# Patient Record
Sex: MALE | HISPANIC OR LATINO | Employment: FULL TIME
[De-identification: names, ages, dates, MRNs, and addresses within clinical notes are randomized per-mention and may not be internally consistent; named-entity substitution may affect disease eponyms.]

---

## 2017-02-10 ENCOUNTER — RECORDS - HEALTHEAST (OUTPATIENT)
Dept: ADMINISTRATIVE | Facility: OTHER | Age: 37
End: 2017-02-10

## 2017-06-23 ENCOUNTER — RECORDS - HEALTHEAST (OUTPATIENT)
Dept: ADMINISTRATIVE | Facility: OTHER | Age: 37
End: 2017-06-23

## 2017-06-23 ENCOUNTER — RECORDS - HEALTHEAST (OUTPATIENT)
Dept: LAB | Facility: CLINIC | Age: 37
End: 2017-06-23

## 2017-06-23 LAB
CHOLEST SERPL-MCNC: 172 MG/DL
FASTING STATUS PATIENT QL REPORTED: NO
HDLC SERPL-MCNC: 46 MG/DL
LDLC SERPL CALC-MCNC: 109 MG/DL
TRIGL SERPL-MCNC: 86 MG/DL

## 2017-07-09 ENCOUNTER — RECORDS - HEALTHEAST (OUTPATIENT)
Dept: ADMINISTRATIVE | Facility: OTHER | Age: 37
End: 2017-07-09

## 2017-07-20 ENCOUNTER — RECORDS - HEALTHEAST (OUTPATIENT)
Dept: ADMINISTRATIVE | Facility: OTHER | Age: 37
End: 2017-07-20

## 2018-01-04 ENCOUNTER — RECORDS - HEALTHEAST (OUTPATIENT)
Dept: ADMINISTRATIVE | Facility: OTHER | Age: 38
End: 2018-01-04

## 2018-07-10 ENCOUNTER — COMMUNICATION - HEALTHEAST (OUTPATIENT)
Dept: TELEHEALTH | Facility: CLINIC | Age: 38
End: 2018-07-10

## 2018-07-10 ENCOUNTER — OFFICE VISIT - HEALTHEAST (OUTPATIENT)
Dept: FAMILY MEDICINE | Facility: CLINIC | Age: 38
End: 2018-07-10

## 2018-07-10 ENCOUNTER — COMMUNICATION - HEALTHEAST (OUTPATIENT)
Dept: FAMILY MEDICINE | Facility: CLINIC | Age: 38
End: 2018-07-10

## 2018-07-10 DIAGNOSIS — F41.9 ANXIETY AND DEPRESSION: ICD-10-CM

## 2018-07-10 DIAGNOSIS — Z13.1 SCREENING FOR DIABETES MELLITUS: ICD-10-CM

## 2018-07-10 DIAGNOSIS — Z76.89 ENCOUNTER TO ESTABLISH CARE: ICD-10-CM

## 2018-07-10 DIAGNOSIS — E78.5 HYPERLIPEMIA: ICD-10-CM

## 2018-07-10 DIAGNOSIS — F32.A ANXIETY AND DEPRESSION: ICD-10-CM

## 2018-07-10 DIAGNOSIS — I10 HYPERTENSION: ICD-10-CM

## 2018-07-10 LAB
ALBUMIN SERPL-MCNC: 4.1 G/DL (ref 3.5–5)
ALP SERPL-CCNC: 130 U/L (ref 45–120)
ALT SERPL W P-5'-P-CCNC: 40 U/L (ref 0–45)
ANION GAP SERPL CALCULATED.3IONS-SCNC: 11 MMOL/L (ref 5–18)
AST SERPL W P-5'-P-CCNC: 25 U/L (ref 0–40)
BILIRUB SERPL-MCNC: 0.9 MG/DL (ref 0–1)
BUN SERPL-MCNC: 20 MG/DL (ref 8–22)
CALCIUM SERPL-MCNC: 9.8 MG/DL (ref 8.5–10.5)
CHLORIDE BLD-SCNC: 104 MMOL/L (ref 98–107)
CHOLEST SERPL-MCNC: 154 MG/DL
CO2 SERPL-SCNC: 25 MMOL/L (ref 22–31)
CREAT SERPL-MCNC: 0.93 MG/DL (ref 0.7–1.3)
FASTING STATUS PATIENT QL REPORTED: NO
GFR SERPL CREATININE-BSD FRML MDRD: >60 ML/MIN/1.73M2
GLUCOSE BLD-MCNC: 106 MG/DL (ref 70–125)
HBA1C MFR BLD: 5.8 % (ref 3.5–6)
HDLC SERPL-MCNC: 48 MG/DL
LDLC SERPL CALC-MCNC: 89 MG/DL
POTASSIUM BLD-SCNC: 4.5 MMOL/L (ref 3.5–5)
PROT SERPL-MCNC: 7.8 G/DL (ref 6–8)
SODIUM SERPL-SCNC: 140 MMOL/L (ref 136–145)
TRIGL SERPL-MCNC: 86 MG/DL

## 2018-07-10 ASSESSMENT — MIFFLIN-ST. JEOR: SCORE: 2232.12

## 2018-11-23 ENCOUNTER — COMMUNICATION - HEALTHEAST (OUTPATIENT)
Dept: FAMILY MEDICINE | Facility: CLINIC | Age: 38
End: 2018-11-23

## 2018-11-23 DIAGNOSIS — E78.5 HYPERLIPEMIA: ICD-10-CM

## 2019-08-19 ENCOUNTER — COMMUNICATION - HEALTHEAST (OUTPATIENT)
Dept: FAMILY MEDICINE | Facility: CLINIC | Age: 39
End: 2019-08-19

## 2019-08-19 DIAGNOSIS — I10 ESSENTIAL HYPERTENSION: ICD-10-CM

## 2019-08-27 ENCOUNTER — OFFICE VISIT - HEALTHEAST (OUTPATIENT)
Dept: FAMILY MEDICINE | Facility: CLINIC | Age: 39
End: 2019-08-27

## 2019-08-27 DIAGNOSIS — F41.9 ANXIETY AND DEPRESSION: ICD-10-CM

## 2019-08-27 DIAGNOSIS — E66.01 MORBID OBESITY (H): ICD-10-CM

## 2019-08-27 DIAGNOSIS — I10 ESSENTIAL HYPERTENSION: ICD-10-CM

## 2019-08-27 DIAGNOSIS — F40.243 ANXIETY WITH FLYING: ICD-10-CM

## 2019-08-27 DIAGNOSIS — F32.A ANXIETY AND DEPRESSION: ICD-10-CM

## 2019-08-27 DIAGNOSIS — E78.5 HYPERLIPIDEMIA, UNSPECIFIED HYPERLIPIDEMIA TYPE: ICD-10-CM

## 2019-08-27 DIAGNOSIS — Z13.1 SCREENING FOR DIABETES MELLITUS: ICD-10-CM

## 2019-08-27 LAB
ALBUMIN SERPL-MCNC: 4 G/DL (ref 3.5–5)
ALP SERPL-CCNC: 132 U/L (ref 45–120)
ALT SERPL W P-5'-P-CCNC: 123 U/L (ref 0–45)
ANION GAP SERPL CALCULATED.3IONS-SCNC: 11 MMOL/L (ref 5–18)
AST SERPL W P-5'-P-CCNC: 61 U/L (ref 0–40)
BILIRUB SERPL-MCNC: 0.7 MG/DL (ref 0–1)
BUN SERPL-MCNC: 18 MG/DL (ref 8–22)
CALCIUM SERPL-MCNC: 10 MG/DL (ref 8.5–10.5)
CHLORIDE BLD-SCNC: 105 MMOL/L (ref 98–107)
CHOLEST SERPL-MCNC: 167 MG/DL
CO2 SERPL-SCNC: 25 MMOL/L (ref 22–31)
CREAT SERPL-MCNC: 1.13 MG/DL (ref 0.7–1.3)
FASTING STATUS PATIENT QL REPORTED: NO
GFR SERPL CREATININE-BSD FRML MDRD: >60 ML/MIN/1.73M2
GLUCOSE BLD-MCNC: 157 MG/DL (ref 70–125)
HBA1C MFR BLD: 8.7 % (ref 3.5–6)
HDLC SERPL-MCNC: 37 MG/DL
LDLC SERPL CALC-MCNC: 93 MG/DL
POTASSIUM BLD-SCNC: 4.6 MMOL/L (ref 3.5–5)
PROT SERPL-MCNC: 7.4 G/DL (ref 6–8)
SODIUM SERPL-SCNC: 141 MMOL/L (ref 136–145)
TRIGL SERPL-MCNC: 186 MG/DL

## 2019-08-27 RX ORDER — LORAZEPAM 0.5 MG/1
TABLET ORAL
Qty: 10 TABLET | Refills: 0 | Status: SHIPPED | OUTPATIENT
Start: 2019-08-27 | End: 2021-11-17

## 2019-08-28 ENCOUNTER — COMMUNICATION - HEALTHEAST (OUTPATIENT)
Dept: FAMILY MEDICINE | Facility: CLINIC | Age: 39
End: 2019-08-28

## 2019-08-28 DIAGNOSIS — E11.65 TYPE 2 DIABETES MELLITUS WITH HYPERGLYCEMIA, WITHOUT LONG-TERM CURRENT USE OF INSULIN (H): ICD-10-CM

## 2019-09-21 ENCOUNTER — AMBULATORY - HEALTHEAST (OUTPATIENT)
Dept: NURSING | Facility: CLINIC | Age: 39
End: 2019-09-21

## 2019-09-26 ENCOUNTER — COMMUNICATION - HEALTHEAST (OUTPATIENT)
Dept: FAMILY MEDICINE | Facility: CLINIC | Age: 39
End: 2019-09-26

## 2019-12-09 ENCOUNTER — COMMUNICATION - HEALTHEAST (OUTPATIENT)
Dept: FAMILY MEDICINE | Facility: CLINIC | Age: 39
End: 2019-12-09

## 2019-12-09 DIAGNOSIS — E11.65 TYPE 2 DIABETES MELLITUS WITH HYPERGLYCEMIA, WITHOUT LONG-TERM CURRENT USE OF INSULIN (H): ICD-10-CM

## 2019-12-10 ENCOUNTER — COMMUNICATION - HEALTHEAST (OUTPATIENT)
Dept: FAMILY MEDICINE | Facility: CLINIC | Age: 39
End: 2019-12-10

## 2019-12-10 DIAGNOSIS — E11.65 TYPE 2 DIABETES MELLITUS WITH HYPERGLYCEMIA, WITHOUT LONG-TERM CURRENT USE OF INSULIN (H): ICD-10-CM

## 2020-01-06 ENCOUNTER — OFFICE VISIT - HEALTHEAST (OUTPATIENT)
Dept: FAMILY MEDICINE | Facility: CLINIC | Age: 40
End: 2020-01-06

## 2020-01-06 DIAGNOSIS — E66.01 MORBID OBESITY (H): ICD-10-CM

## 2020-01-06 DIAGNOSIS — R79.89 ELEVATED LFTS: ICD-10-CM

## 2020-01-06 DIAGNOSIS — E11.65 TYPE 2 DIABETES MELLITUS WITH HYPERGLYCEMIA, WITHOUT LONG-TERM CURRENT USE OF INSULIN (H): ICD-10-CM

## 2020-01-06 LAB
ALBUMIN SERPL-MCNC: 3.7 G/DL (ref 3.5–5)
ALP SERPL-CCNC: 114 U/L (ref 45–120)
ALT SERPL W P-5'-P-CCNC: 167 U/L (ref 0–45)
ANION GAP SERPL CALCULATED.3IONS-SCNC: 9 MMOL/L (ref 5–18)
AST SERPL W P-5'-P-CCNC: 96 U/L (ref 0–40)
BILIRUB SERPL-MCNC: 0.7 MG/DL (ref 0–1)
BUN SERPL-MCNC: 14 MG/DL (ref 8–22)
CALCIUM SERPL-MCNC: 9.1 MG/DL (ref 8.5–10.5)
CHLORIDE BLD-SCNC: 104 MMOL/L (ref 98–107)
CO2 SERPL-SCNC: 24 MMOL/L (ref 22–31)
CREAT SERPL-MCNC: 0.96 MG/DL (ref 0.7–1.3)
CREAT UR-MCNC: 121.3 MG/DL
GFR SERPL CREATININE-BSD FRML MDRD: >60 ML/MIN/1.73M2
GLUCOSE BLD-MCNC: 269 MG/DL (ref 70–125)
HBA1C MFR BLD: 10.1 % (ref 3.5–6)
MICROALBUMIN UR-MCNC: <0.5 MG/DL (ref 0–1.99)
MICROALBUMIN/CREAT UR: NORMAL MG/G{CREAT}
POTASSIUM BLD-SCNC: 5.1 MMOL/L (ref 3.5–5)
PROT SERPL-MCNC: 7.1 G/DL (ref 6–8)
SODIUM SERPL-SCNC: 137 MMOL/L (ref 136–145)

## 2020-01-06 ASSESSMENT — MIFFLIN-ST. JEOR: SCORE: 2326.47

## 2020-01-10 ENCOUNTER — HOSPITAL ENCOUNTER (OUTPATIENT)
Dept: ULTRASOUND IMAGING | Facility: CLINIC | Age: 40
Discharge: HOME | End: 2020-01-10

## 2020-01-10 DIAGNOSIS — R94.5 ABNORMAL RESULTS OF LIVER FUNCTION STUDIES: ICD-10-CM

## 2020-01-10 DIAGNOSIS — R79.89 ELEVATED LFTS: ICD-10-CM

## 2020-01-10 RX ORDER — GLUCOSAMINE HCL/CHONDROITIN SU 500-400 MG
1 CAPSULE ORAL 2 TIMES DAILY PRN
Qty: 100 STRIP | Refills: 2 | Status: SHIPPED | OUTPATIENT
Start: 2020-01-10 | End: 2022-03-08

## 2020-01-27 ENCOUNTER — COMMUNICATION - HEALTHEAST (OUTPATIENT)
Dept: SCHEDULING | Facility: CLINIC | Age: 40
End: 2020-01-27

## 2020-01-28 ENCOUNTER — OFFICE VISIT - HEALTHEAST (OUTPATIENT)
Dept: FAMILY MEDICINE | Facility: CLINIC | Age: 40
End: 2020-01-28

## 2020-01-28 DIAGNOSIS — R42 DIZZINESS: ICD-10-CM

## 2020-01-28 DIAGNOSIS — E11.65 TYPE 2 DIABETES MELLITUS WITH HYPERGLYCEMIA, WITHOUT LONG-TERM CURRENT USE OF INSULIN (H): ICD-10-CM

## 2020-01-28 DIAGNOSIS — R19.7 DIARRHEA, UNSPECIFIED TYPE: ICD-10-CM

## 2020-01-30 ENCOUNTER — COMMUNICATION - HEALTHEAST (OUTPATIENT)
Dept: PHARMACY | Facility: CLINIC | Age: 40
End: 2020-01-30

## 2020-02-04 ENCOUNTER — AMBULATORY - HEALTHEAST (OUTPATIENT)
Dept: PHARMACY | Facility: CLINIC | Age: 40
End: 2020-02-04

## 2020-02-04 DIAGNOSIS — E11.65 TYPE 2 DIABETES MELLITUS WITH HYPERGLYCEMIA, WITHOUT LONG-TERM CURRENT USE OF INSULIN (H): ICD-10-CM

## 2020-02-05 ENCOUNTER — OFFICE VISIT - HEALTHEAST (OUTPATIENT)
Dept: PHARMACY | Facility: CLINIC | Age: 40
End: 2020-02-05

## 2020-02-05 DIAGNOSIS — E78.5 HYPERLIPIDEMIA, UNSPECIFIED HYPERLIPIDEMIA TYPE: ICD-10-CM

## 2020-02-05 DIAGNOSIS — E11.65 TYPE 2 DIABETES MELLITUS WITH HYPERGLYCEMIA, WITHOUT LONG-TERM CURRENT USE OF INSULIN (H): ICD-10-CM

## 2020-02-05 DIAGNOSIS — F32.A ANXIETY AND DEPRESSION: ICD-10-CM

## 2020-02-05 DIAGNOSIS — F41.9 ANXIETY AND DEPRESSION: ICD-10-CM

## 2020-02-05 DIAGNOSIS — I10 ESSENTIAL HYPERTENSION: ICD-10-CM

## 2020-02-15 ENCOUNTER — COMMUNICATION - HEALTHEAST (OUTPATIENT)
Dept: FAMILY MEDICINE | Facility: CLINIC | Age: 40
End: 2020-02-15

## 2020-02-15 DIAGNOSIS — E78.5 HYPERLIPIDEMIA, UNSPECIFIED HYPERLIPIDEMIA TYPE: ICD-10-CM

## 2020-03-09 ENCOUNTER — OFFICE VISIT - HEALTHEAST (OUTPATIENT)
Dept: PHARMACY | Facility: CLINIC | Age: 40
End: 2020-03-09

## 2020-03-09 DIAGNOSIS — F32.A ANXIETY AND DEPRESSION: ICD-10-CM

## 2020-03-09 DIAGNOSIS — E11.65 TYPE 2 DIABETES MELLITUS WITH HYPERGLYCEMIA, WITHOUT LONG-TERM CURRENT USE OF INSULIN (H): ICD-10-CM

## 2020-03-09 DIAGNOSIS — F41.9 ANXIETY AND DEPRESSION: ICD-10-CM

## 2020-03-09 DIAGNOSIS — E78.5 HYPERLIPIDEMIA, UNSPECIFIED HYPERLIPIDEMIA TYPE: ICD-10-CM

## 2020-03-09 DIAGNOSIS — I10 ESSENTIAL HYPERTENSION: ICD-10-CM

## 2020-03-09 ASSESSMENT — ANXIETY QUESTIONNAIRES
IF YOU CHECKED OFF ANY PROBLEMS ON THIS QUESTIONNAIRE, HOW DIFFICULT HAVE THESE PROBLEMS MADE IT FOR YOU TO DO YOUR WORK, TAKE CARE OF THINGS AT HOME, OR GET ALONG WITH OTHER PEOPLE: NOT DIFFICULT AT ALL
6. BECOMING EASILY ANNOYED OR IRRITABLE: NOT AT ALL
GAD7 TOTAL SCORE: 1
5. BEING SO RESTLESS THAT IT IS HARD TO SIT STILL: NOT AT ALL
4. TROUBLE RELAXING: NOT AT ALL
7. FEELING AFRAID AS IF SOMETHING AWFUL MIGHT HAPPEN: NOT AT ALL
2. NOT BEING ABLE TO STOP OR CONTROL WORRYING: NOT AT ALL
3. WORRYING TOO MUCH ABOUT DIFFERENT THINGS: NOT AT ALL
1. FEELING NERVOUS, ANXIOUS, OR ON EDGE: SEVERAL DAYS

## 2020-03-09 ASSESSMENT — PATIENT HEALTH QUESTIONNAIRE - PHQ9: SUM OF ALL RESPONSES TO PHQ QUESTIONS 1-9: 0

## 2020-03-30 ENCOUNTER — COMMUNICATION - HEALTHEAST (OUTPATIENT)
Dept: FAMILY MEDICINE | Facility: CLINIC | Age: 40
End: 2020-03-30

## 2020-04-05 ENCOUNTER — COMMUNICATION - HEALTHEAST (OUTPATIENT)
Dept: NURSING | Facility: CLINIC | Age: 40
End: 2020-04-05

## 2020-05-07 ENCOUNTER — COMMUNICATION - HEALTHEAST (OUTPATIENT)
Dept: FAMILY MEDICINE | Facility: CLINIC | Age: 40
End: 2020-05-07

## 2020-05-07 DIAGNOSIS — E11.65 TYPE 2 DIABETES MELLITUS WITH HYPERGLYCEMIA, WITHOUT LONG-TERM CURRENT USE OF INSULIN (H): ICD-10-CM

## 2020-05-18 ENCOUNTER — COMMUNICATION - HEALTHEAST (OUTPATIENT)
Dept: NURSING | Facility: CLINIC | Age: 40
End: 2020-05-18

## 2020-05-28 ENCOUNTER — AMBULATORY - HEALTHEAST (OUTPATIENT)
Dept: LAB | Facility: CLINIC | Age: 40
End: 2020-05-28

## 2020-05-28 DIAGNOSIS — E11.65 TYPE 2 DIABETES MELLITUS WITH HYPERGLYCEMIA, WITHOUT LONG-TERM CURRENT USE OF INSULIN (H): ICD-10-CM

## 2020-05-28 LAB
ALBUMIN SERPL-MCNC: 3.6 G/DL (ref 3.5–5)
ALP SERPL-CCNC: 98 U/L (ref 45–120)
ALT SERPL W P-5'-P-CCNC: 72 U/L (ref 0–45)
ANION GAP SERPL CALCULATED.3IONS-SCNC: 10 MMOL/L (ref 5–18)
AST SERPL W P-5'-P-CCNC: 84 U/L (ref 0–40)
BILIRUB SERPL-MCNC: 0.6 MG/DL (ref 0–1)
BUN SERPL-MCNC: 13 MG/DL (ref 8–22)
CALCIUM SERPL-MCNC: 9.1 MG/DL (ref 8.5–10.5)
CHLORIDE BLD-SCNC: 104 MMOL/L (ref 98–107)
CO2 SERPL-SCNC: 26 MMOL/L (ref 22–31)
CREAT SERPL-MCNC: 0.91 MG/DL (ref 0.7–1.3)
GFR SERPL CREATININE-BSD FRML MDRD: >60 ML/MIN/1.73M2
GLUCOSE BLD-MCNC: 115 MG/DL (ref 70–125)
HBA1C MFR BLD: 5.8 % (ref 3.5–6)
POTASSIUM BLD-SCNC: 4.4 MMOL/L (ref 3.5–5)
PROT SERPL-MCNC: 7 G/DL (ref 6–8)
SODIUM SERPL-SCNC: 140 MMOL/L (ref 136–145)

## 2020-06-01 ENCOUNTER — OFFICE VISIT - HEALTHEAST (OUTPATIENT)
Dept: FAMILY MEDICINE | Facility: CLINIC | Age: 40
End: 2020-06-01

## 2020-06-01 ENCOUNTER — AMBULATORY - HEALTHEAST (OUTPATIENT)
Dept: PHARMACY | Facility: CLINIC | Age: 40
End: 2020-06-01

## 2020-06-01 DIAGNOSIS — F41.9 ANXIETY AND DEPRESSION: ICD-10-CM

## 2020-06-01 DIAGNOSIS — E11.65 TYPE 2 DIABETES MELLITUS WITH HYPERGLYCEMIA, WITHOUT LONG-TERM CURRENT USE OF INSULIN (H): ICD-10-CM

## 2020-06-01 DIAGNOSIS — F32.A ANXIETY AND DEPRESSION: ICD-10-CM

## 2020-06-01 DIAGNOSIS — E78.5 HYPERLIPIDEMIA, UNSPECIFIED HYPERLIPIDEMIA TYPE: ICD-10-CM

## 2020-06-01 DIAGNOSIS — I10 ESSENTIAL HYPERTENSION: ICD-10-CM

## 2020-06-01 PROCEDURE — 99607 MTMS BY PHARM ADDL 15 MIN: CPT | Performed by: PHARMACIST

## 2020-06-01 PROCEDURE — 99606 MTMS BY PHARM EST 15 MIN: CPT | Performed by: PHARMACIST

## 2020-06-01 ASSESSMENT — ANXIETY QUESTIONNAIRES
4. TROUBLE RELAXING: NEARLY EVERY DAY
3. WORRYING TOO MUCH ABOUT DIFFERENT THINGS: NEARLY EVERY DAY
1. FEELING NERVOUS, ANXIOUS, OR ON EDGE: SEVERAL DAYS
5. BEING SO RESTLESS THAT IT IS HARD TO SIT STILL: SEVERAL DAYS
2. NOT BEING ABLE TO STOP OR CONTROL WORRYING: NEARLY EVERY DAY
IF YOU CHECKED OFF ANY PROBLEMS ON THIS QUESTIONNAIRE, HOW DIFFICULT HAVE THESE PROBLEMS MADE IT FOR YOU TO DO YOUR WORK, TAKE CARE OF THINGS AT HOME, OR GET ALONG WITH OTHER PEOPLE: NOT DIFFICULT AT ALL
6. BECOMING EASILY ANNOYED OR IRRITABLE: SEVERAL DAYS
GAD7 TOTAL SCORE: 13
7. FEELING AFRAID AS IF SOMETHING AWFUL MIGHT HAPPEN: SEVERAL DAYS

## 2020-07-03 ENCOUNTER — OFFICE VISIT - HEALTHEAST (OUTPATIENT)
Dept: FAMILY MEDICINE | Facility: CLINIC | Age: 40
End: 2020-07-03

## 2020-07-03 ENCOUNTER — RECORDS - HEALTHEAST (OUTPATIENT)
Dept: GENERAL RADIOLOGY | Facility: CLINIC | Age: 40
End: 2020-07-03

## 2020-07-03 DIAGNOSIS — M79.672 PAIN IN LEFT FOOT: ICD-10-CM

## 2020-07-03 DIAGNOSIS — G57.62 MORTON'S NEUROMA OF LEFT FOOT: ICD-10-CM

## 2020-07-03 DIAGNOSIS — M79.672 LEFT FOOT PAIN: ICD-10-CM

## 2020-07-14 ENCOUNTER — COMMUNICATION - HEALTHEAST (OUTPATIENT)
Dept: NURSING | Facility: CLINIC | Age: 40
End: 2020-07-14

## 2020-07-16 ENCOUNTER — COMMUNICATION - HEALTHEAST (OUTPATIENT)
Dept: FAMILY MEDICINE | Facility: CLINIC | Age: 40
End: 2020-07-16

## 2020-07-17 ENCOUNTER — AMBULATORY - HEALTHEAST (OUTPATIENT)
Dept: PHARMACY | Facility: CLINIC | Age: 40
End: 2020-07-17

## 2020-07-17 DIAGNOSIS — E11.65 TYPE 2 DIABETES MELLITUS WITH HYPERGLYCEMIA, WITHOUT LONG-TERM CURRENT USE OF INSULIN (H): ICD-10-CM

## 2020-07-26 ENCOUNTER — COMMUNICATION - HEALTHEAST (OUTPATIENT)
Dept: FAMILY MEDICINE | Facility: CLINIC | Age: 40
End: 2020-07-26

## 2020-07-26 DIAGNOSIS — F32.A ANXIETY AND DEPRESSION: ICD-10-CM

## 2020-07-26 DIAGNOSIS — F41.9 ANXIETY AND DEPRESSION: ICD-10-CM

## 2020-07-27 ENCOUNTER — COMMUNICATION - HEALTHEAST (OUTPATIENT)
Dept: NURSING | Facility: CLINIC | Age: 40
End: 2020-07-27

## 2020-07-27 ENCOUNTER — AMBULATORY - HEALTHEAST (OUTPATIENT)
Dept: PHARMACY | Facility: CLINIC | Age: 40
End: 2020-07-27

## 2020-07-27 DIAGNOSIS — F32.A ANXIETY AND DEPRESSION: ICD-10-CM

## 2020-07-27 DIAGNOSIS — E78.5 HYPERLIPIDEMIA, UNSPECIFIED HYPERLIPIDEMIA TYPE: ICD-10-CM

## 2020-07-27 DIAGNOSIS — F41.9 ANXIETY AND DEPRESSION: ICD-10-CM

## 2020-07-27 DIAGNOSIS — E11.65 TYPE 2 DIABETES MELLITUS WITH HYPERGLYCEMIA, WITHOUT LONG-TERM CURRENT USE OF INSULIN (H): ICD-10-CM

## 2020-07-27 DIAGNOSIS — I10 ESSENTIAL HYPERTENSION: ICD-10-CM

## 2020-07-27 PROCEDURE — 99606 MTMS BY PHARM EST 15 MIN: CPT | Performed by: PHARMACIST

## 2020-08-05 ENCOUNTER — COMMUNICATION - HEALTHEAST (OUTPATIENT)
Dept: FAMILY MEDICINE | Facility: CLINIC | Age: 40
End: 2020-08-05

## 2020-08-28 ENCOUNTER — COMMUNICATION - HEALTHEAST (OUTPATIENT)
Dept: FAMILY MEDICINE | Facility: CLINIC | Age: 40
End: 2020-08-28

## 2020-08-31 ENCOUNTER — OFFICE VISIT - HEALTHEAST (OUTPATIENT)
Dept: FAMILY MEDICINE | Facility: CLINIC | Age: 40
End: 2020-08-31

## 2020-08-31 DIAGNOSIS — F41.9 ANXIETY AND DEPRESSION: ICD-10-CM

## 2020-08-31 DIAGNOSIS — F32.A ANXIETY AND DEPRESSION: ICD-10-CM

## 2020-08-31 DIAGNOSIS — G47.00 INSOMNIA, UNSPECIFIED TYPE: ICD-10-CM

## 2020-08-31 ASSESSMENT — ANXIETY QUESTIONNAIRES
4. TROUBLE RELAXING: NEARLY EVERY DAY
6. BECOMING EASILY ANNOYED OR IRRITABLE: NEARLY EVERY DAY
IF YOU CHECKED OFF ANY PROBLEMS ON THIS QUESTIONNAIRE, HOW DIFFICULT HAVE THESE PROBLEMS MADE IT FOR YOU TO DO YOUR WORK, TAKE CARE OF THINGS AT HOME, OR GET ALONG WITH OTHER PEOPLE: SOMEWHAT DIFFICULT
1. FEELING NERVOUS, ANXIOUS, OR ON EDGE: NEARLY EVERY DAY
3. WORRYING TOO MUCH ABOUT DIFFERENT THINGS: NEARLY EVERY DAY
GAD7 TOTAL SCORE: 21
5. BEING SO RESTLESS THAT IT IS HARD TO SIT STILL: NEARLY EVERY DAY
7. FEELING AFRAID AS IF SOMETHING AWFUL MIGHT HAPPEN: NEARLY EVERY DAY
2. NOT BEING ABLE TO STOP OR CONTROL WORRYING: NEARLY EVERY DAY

## 2020-08-31 ASSESSMENT — PATIENT HEALTH QUESTIONNAIRE - PHQ9: SUM OF ALL RESPONSES TO PHQ QUESTIONS 1-9: 20

## 2020-09-08 ENCOUNTER — COMMUNICATION - HEALTHEAST (OUTPATIENT)
Dept: FAMILY MEDICINE | Facility: CLINIC | Age: 40
End: 2020-09-08

## 2020-09-08 ENCOUNTER — OFFICE VISIT - HEALTHEAST (OUTPATIENT)
Dept: FAMILY MEDICINE | Facility: CLINIC | Age: 40
End: 2020-09-08

## 2020-09-08 DIAGNOSIS — G47.00 INSOMNIA, UNSPECIFIED TYPE: ICD-10-CM

## 2020-09-08 DIAGNOSIS — F32.A ANXIETY AND DEPRESSION: ICD-10-CM

## 2020-09-08 DIAGNOSIS — F41.9 ANXIETY AND DEPRESSION: ICD-10-CM

## 2020-09-09 ASSESSMENT — ANXIETY QUESTIONNAIRES
2. NOT BEING ABLE TO STOP OR CONTROL WORRYING: NEARLY EVERY DAY
5. BEING SO RESTLESS THAT IT IS HARD TO SIT STILL: MORE THAN HALF THE DAYS
GAD7 TOTAL SCORE: 20
7. FEELING AFRAID AS IF SOMETHING AWFUL MIGHT HAPPEN: NEARLY EVERY DAY
1. FEELING NERVOUS, ANXIOUS, OR ON EDGE: NEARLY EVERY DAY
4. TROUBLE RELAXING: NEARLY EVERY DAY
IF YOU CHECKED OFF ANY PROBLEMS ON THIS QUESTIONNAIRE, HOW DIFFICULT HAVE THESE PROBLEMS MADE IT FOR YOU TO DO YOUR WORK, TAKE CARE OF THINGS AT HOME, OR GET ALONG WITH OTHER PEOPLE: SOMEWHAT DIFFICULT
6. BECOMING EASILY ANNOYED OR IRRITABLE: NEARLY EVERY DAY
3. WORRYING TOO MUCH ABOUT DIFFERENT THINGS: NEARLY EVERY DAY

## 2020-09-09 ASSESSMENT — PATIENT HEALTH QUESTIONNAIRE - PHQ9: SUM OF ALL RESPONSES TO PHQ QUESTIONS 1-9: 24

## 2020-09-13 ENCOUNTER — COMMUNICATION - HEALTHEAST (OUTPATIENT)
Dept: FAMILY MEDICINE | Facility: CLINIC | Age: 40
End: 2020-09-13

## 2020-09-13 DIAGNOSIS — I10 ESSENTIAL HYPERTENSION: ICD-10-CM

## 2020-09-18 ENCOUNTER — OFFICE VISIT - HEALTHEAST (OUTPATIENT)
Dept: FAMILY MEDICINE | Facility: CLINIC | Age: 40
End: 2020-09-18

## 2020-09-18 ENCOUNTER — COMMUNICATION - HEALTHEAST (OUTPATIENT)
Dept: SCHEDULING | Facility: CLINIC | Age: 40
End: 2020-09-18

## 2020-09-18 DIAGNOSIS — I10 ESSENTIAL HYPERTENSION: ICD-10-CM

## 2020-09-18 DIAGNOSIS — G47.00 INSOMNIA, UNSPECIFIED TYPE: ICD-10-CM

## 2020-09-18 DIAGNOSIS — F32.A ANXIETY AND DEPRESSION: ICD-10-CM

## 2020-09-18 DIAGNOSIS — F41.9 ANXIETY AND DEPRESSION: ICD-10-CM

## 2020-09-18 RX ORDER — LISINOPRIL 5 MG/1
5 TABLET ORAL DAILY
Qty: 90 TABLET | Refills: 3 | Status: SHIPPED | OUTPATIENT
Start: 2020-09-18 | End: 2021-07-23

## 2020-09-18 ASSESSMENT — ANXIETY QUESTIONNAIRES
3. WORRYING TOO MUCH ABOUT DIFFERENT THINGS: MORE THAN HALF THE DAYS
4. TROUBLE RELAXING: MORE THAN HALF THE DAYS
GAD7 TOTAL SCORE: 13
1. FEELING NERVOUS, ANXIOUS, OR ON EDGE: MORE THAN HALF THE DAYS
7. FEELING AFRAID AS IF SOMETHING AWFUL MIGHT HAPPEN: MORE THAN HALF THE DAYS
5. BEING SO RESTLESS THAT IT IS HARD TO SIT STILL: SEVERAL DAYS
6. BECOMING EASILY ANNOYED OR IRRITABLE: MORE THAN HALF THE DAYS
2. NOT BEING ABLE TO STOP OR CONTROL WORRYING: MORE THAN HALF THE DAYS

## 2020-09-18 ASSESSMENT — PATIENT HEALTH QUESTIONNAIRE - PHQ9: SUM OF ALL RESPONSES TO PHQ QUESTIONS 1-9: 17

## 2020-09-19 ENCOUNTER — COMMUNICATION - HEALTHEAST (OUTPATIENT)
Dept: SCHEDULING | Facility: CLINIC | Age: 40
End: 2020-09-19

## 2020-09-19 DIAGNOSIS — G47.00 INSOMNIA, UNSPECIFIED TYPE: ICD-10-CM

## 2020-09-19 DIAGNOSIS — F32.A ANXIETY AND DEPRESSION: ICD-10-CM

## 2020-09-19 DIAGNOSIS — F41.9 ANXIETY AND DEPRESSION: ICD-10-CM

## 2020-10-23 ENCOUNTER — COMMUNICATION - HEALTHEAST (OUTPATIENT)
Dept: ADMINISTRATIVE | Facility: CLINIC | Age: 40
End: 2020-10-23

## 2020-11-09 ENCOUNTER — VIRTUAL VISIT (OUTPATIENT)
Dept: FAMILY MEDICINE | Facility: OTHER | Age: 40
End: 2020-11-09

## 2020-11-09 NOTE — PROGRESS NOTES
"Date: 2020 07:31:03  Clinician: Mechelle Dill  Clinician NPI: [id number]  Patient: Misael Morillo  Patient : 1980  Patient Address[place] [facility] [place] [id number]  Patient Phone: [phone]  Visit Protocol: URI  Patient Summary:  Misael is a 40 year old ( : 1980 ) male who initiated a [facility] Visit for COVID-19 (Coronavirus) evaluation and screening. When asked the question \"Please sign me up to receive news, health information and promotions from [facility].\", Misael responded \"Yes\".    Misael states his symptoms started 1-2 days ago.   His symptoms consist of myalgia, chills, malaise, a sore throat, ageusia, a headache, a cough, nasal congestion, and anosmia. He is experiencing mild difficulty breathing with activities but can speak normally in full sentences. Misael also feels feverish.   Symptom details     Nasal secretions: The color of his mucus is yellow.    Cough: Misael coughs a few times an hour and his cough is not more bothersome at night. Phlegm comes into his throat when he coughs. He believes his cough is caused by post-nasal drip. The color of the phlegm is green and yellow.     Sore throat: Misael reports having mild throat pain (1-3 on a 10 point pain scale), does not have exudate on his tonsils, and can swallow liquids. He is not sure if the lymph nodes in his neck are enlarged. A rash has not appeared on the skin since the sore throat started.     Temperature: His current temperature is 97 degrees Fahrenheit.     Headache: He states the headache is mild (1-3 on a 10 point pain scale).      Misael denies having vomiting, rhinitis, facial pain or pressure, teeth pain, diarrhea, ear pain, wheezing, and nausea. He also denies taking antibiotic medication in the past month, having recent facial or sinus surgery in the past 60 days, and having a sinus infection within the past year.   Precipitating events  Within the past week, Misael has been exposed to someone with strep throat. He has not " recently been exposed to someone with influenza. Misael has been in close contact with the following high risk individuals: people with asthma, heart disease or diabetes.   Pertinent COVID-19 (Coronavirus) information  Misael does not work or volunteer as healthcare worker or a . In the past 14 days, Misael has not worked or volunteered at a healthcare facility or group living setting.   In the past 14 days, he also has not lived in a congregate living setting.   Misael has not had a close contact with a laboratory-confirmed COVID-19 patient within 14 days of symptom onset.    Since December 2019, Misael has not been tested for COVID-19 and has not had upper respiratory infection or influenza-like illness.   Pertinent medical history  Misael needs a return to work/school note.   Weight: 318 lbs   Misael does not smoke or use smokeless tobacco.   Weight: 318 lbs    MEDICATIONS: atorvastatin oral, metformin oral, lisinopril-hydrochlorothiazide oral, venlafaxine oral, trazodone oral, Rybelsus oral, ALLERGIES: NKDA  Clinician Response:  Dear Misael,   Your symptoms show that you may have coronavirus (COVID-19). This illness can cause fever, cough and trouble breathing. Many people get a mild case and get better on their own. Some people can get very sick.  What should I do?  We would like to test you for this virus.   1. Please call [phone] to schedule your visit. Explain that you were [facility] have [facility]-19 test. Be ready to share your [facility] visit ID number.  * If you need to schedule in [facility] please call [phone] or for [employer] employees please call [phone].  * If you need to schedule in the [place] area please call [phone]. [employer] employees call [phone].  The following will serve as your written order for this COVID Test, ordered by me, for the indication of suspected COVID [Z20.828]: The test will be ordered in [employer], our electronic health record, after you are  "scheduled. It will show as ordered and authorized by Boy Ruiz MD.  Order: COVID-19 (Coronavirus) PCR for SYMPTOMATIC testing from [facility].   2. When it's time for your COVID test:  Stay at least 6 feet away from others. (If someone will drive you to your test, stay in the backseat, as far away from the  as you can.)   Cover your mouth and nose with a mask, tissue or washcloth.  Go straight to the testing site. Don't make any stops on the way there or back.      3.Starting now: Stay home and away from others (self-isolate) until:   You've had no fever---and no medicine that reduces fever---for one full day (24 hours). And...   Your other symptoms have gotten better. For example, your cough or breathing has improved. And...   At least 10 days have passed since your symptoms started.       During this time, don't leave the house except for testing or medical care.   Stay in your own room, even for meals. Use your own bathroom if you can.   Stay away from others in your home. No hugging, kissing or shaking hands. No visitors.  Don't go to work, school or anywhere else.    Clean \"high touch\" surfaces often (doorknobs, counters, handles, etc.). Use a household cleaning spray or wipes. You'll find a full list of  on the EPA website: www.epa.gov/pesticide-registration/list-n-disinfectants-use-against-sars-cov-2.   Cover your mouth and nose with a mask, tissue or washcloth to avoid spreading germs.  Wash your hands and face often. Use soap and water.  Caregivers in these groups are at risk for severe illness due to COVID-19:  o People 65 years and older  o People who live in a nursing home or long-term care facility  o People with chronic disease (lung, heart, cancer, diabetes, kidney, liver, immunologic)  o People who have a weakened immune system, including those who:   Are in cancer treatment  Take medicine that weakens the immune system, such as corticosteroids  Had a bone marrow or organ transplant  Have " an immune deficiency  Have poorly controlled HIV or AIDS  Are obese (body mass index of 40 or higher)  Smoke regularly   o Caregivers should wear gloves while washing dishes, handling laundry and cleaning bedrooms and bathrooms.  o Use caution when washing and drying laundry: Don't shake dirty laundry, and use the warmest water setting that you can.  o For more tips, go to www.cdc.gov/coronavirus/2019-ncov/downloads/10Things.pdf.    4.Sign up for [employer]. We know it's scary to hear that you might have COVID-19. We want to track your symptoms to make sure you're okay over the next 2 weeks. Please look for an email from [employer]---this is a free, online program that we'll use to keep in touch. To sign up, follow the link in the email. Learn more at [email or web address]  How can I take care of myself?   Get lots of rest. Drink extra fluids (unless a doctor has told you not to).   Take Tylenol (acetaminophen) for fever or pain. If you have liver or kidney problems, ask your family doctor if it's okay to take Tylenol.   Adults can take either:    650 mg (two 325 mg pills) every 4 to 6 hours, or...   1,000 mg (two 500 mg pills) every 8 hours as needed.    Note: Don't take more than 3,000 mg in one day. Acetaminophen is found in many medicines (both prescribed and over-the-counter medicines). Read all labels to be sure you don't take too much.   For children, check the Tylenol bottle for the right dose. The dose is based on the child's age or weight.    If you have other health problems (like cancer, heart failure, an organ transplant or severe kidney disease): Call your specialty clinic if you don't feel better in the next 2 days.       Know when to call 911. Emergency warning signs include:    Trouble breathing or shortness of breath Pain or pressure in the chest that doesn't go away Feeling confused like you haven't felt before, or not being able to wake up Bluish-colored lips or face.  Where can I  get more information?   [facility] -- About COVID-19: [email or web address]   CDC -- What to Do If You're Sick: www.cdc.gov/coronavirus/2019-ncov/about/steps-when-sick.html   [facility] -- Ending Home Isolation: www.cdc.gov/coronavirus/2019-ncov/hcp/disposition-in-home-patients.html   [facility] -- Caring for Someone: www.cdc.gov/coronavirus/2019-ncov/if-you-are-sick/care-for-someone.html   [facility] -- Interim Guidance for Hospital Discharge to Home: www.[facility].[place].us/diseases/coronavirus/hcp/hospdischarge.pdf   [facility] clinical trials (COVID-19 research studies): clinicalaffairs.[facility]-clinical-trials    Below are the COVID-19 hotlines at the [place] Department of Health ([facility]). Interpreters are available.    For health questions: Call 554-627-6331 or 1-430.765.1014 (7 a.m. to 7 p.m.) For questions about schools and childcare: Call 422-004-9925 or 1-104.743.7781 (7 a.m. to 7 p.m.)    Diagnosis: Myalgia, unspecified site  Diagnosis ICD: M79.10

## 2020-11-10 ENCOUNTER — OFFICE VISIT - HEALTHEAST (OUTPATIENT)
Dept: FAMILY MEDICINE | Facility: CLINIC | Age: 40
End: 2020-11-10

## 2020-11-10 ENCOUNTER — COMMUNICATION - HEALTHEAST (OUTPATIENT)
Dept: FAMILY MEDICINE | Facility: CLINIC | Age: 40
End: 2020-11-10

## 2020-11-10 DIAGNOSIS — J02.9 SORE THROAT: ICD-10-CM

## 2020-11-10 LAB — DEPRECATED S PYO AG THROAT QL EIA: NORMAL

## 2020-11-11 LAB — GROUP A STREP BY PCR: NORMAL

## 2020-11-18 ENCOUNTER — COMMUNICATION - HEALTHEAST (OUTPATIENT)
Dept: FAMILY MEDICINE | Facility: CLINIC | Age: 40
End: 2020-11-18

## 2020-11-18 DIAGNOSIS — F32.A ANXIETY AND DEPRESSION: ICD-10-CM

## 2020-11-18 DIAGNOSIS — G47.00 INSOMNIA, UNSPECIFIED TYPE: ICD-10-CM

## 2020-11-18 DIAGNOSIS — F41.9 ANXIETY AND DEPRESSION: ICD-10-CM

## 2021-01-06 ENCOUNTER — COMMUNICATION - HEALTHEAST (OUTPATIENT)
Dept: FAMILY MEDICINE | Facility: CLINIC | Age: 41
End: 2021-01-06

## 2021-01-15 ENCOUNTER — OFFICE VISIT - HEALTHEAST (OUTPATIENT)
Dept: FAMILY MEDICINE | Facility: CLINIC | Age: 41
End: 2021-01-15

## 2021-01-15 DIAGNOSIS — G47.00 INSOMNIA, UNSPECIFIED TYPE: ICD-10-CM

## 2021-01-15 DIAGNOSIS — E66.01 MORBID OBESITY (H): ICD-10-CM

## 2021-01-15 DIAGNOSIS — F41.9 ANXIETY AND DEPRESSION: ICD-10-CM

## 2021-01-15 DIAGNOSIS — F32.A ANXIETY AND DEPRESSION: ICD-10-CM

## 2021-01-15 DIAGNOSIS — E11.65 TYPE 2 DIABETES MELLITUS WITH HYPERGLYCEMIA, WITHOUT LONG-TERM CURRENT USE OF INSULIN (H): ICD-10-CM

## 2021-01-15 LAB
ALBUMIN SERPL-MCNC: 4.1 G/DL (ref 3.5–5)
ALP SERPL-CCNC: 122 U/L (ref 45–120)
ALT SERPL W P-5'-P-CCNC: 198 U/L (ref 0–45)
ANION GAP SERPL CALCULATED.3IONS-SCNC: 10 MMOL/L (ref 5–18)
AST SERPL W P-5'-P-CCNC: 112 U/L (ref 0–40)
BILIRUB SERPL-MCNC: 0.8 MG/DL (ref 0–1)
BUN SERPL-MCNC: 13 MG/DL (ref 8–22)
CALCIUM SERPL-MCNC: 9.4 MG/DL (ref 8.5–10.5)
CHLORIDE BLD-SCNC: 102 MMOL/L (ref 98–107)
CHOLEST SERPL-MCNC: 153 MG/DL
CO2 SERPL-SCNC: 27 MMOL/L (ref 22–31)
CREAT SERPL-MCNC: 1.06 MG/DL (ref 0.7–1.3)
CREAT UR-MCNC: 318.2 MG/DL
FASTING STATUS PATIENT QL REPORTED: YES
GFR SERPL CREATININE-BSD FRML MDRD: >60 ML/MIN/1.73M2
GLUCOSE BLD-MCNC: 271 MG/DL (ref 70–125)
HBA1C MFR BLD: 10.7 %
HDLC SERPL-MCNC: 38 MG/DL
LDLC SERPL CALC-MCNC: 91 MG/DL
MICROALBUMIN UR-MCNC: 1.99 MG/DL (ref 0–1.99)
MICROALBUMIN/CREAT UR: 6.3 MG/G
POTASSIUM BLD-SCNC: 4.5 MMOL/L (ref 3.5–5)
PROT SERPL-MCNC: 7.6 G/DL (ref 6–8)
SODIUM SERPL-SCNC: 139 MMOL/L (ref 136–145)
TRIGL SERPL-MCNC: 118 MG/DL

## 2021-01-15 RX ORDER — VENLAFAXINE HYDROCHLORIDE 75 MG/1
225 CAPSULE, EXTENDED RELEASE ORAL DAILY
Qty: 240 CAPSULE | Refills: 3 | Status: SHIPPED | OUTPATIENT
Start: 2021-01-15 | End: 2021-11-15 | Stop reason: DRUGHIGH

## 2021-01-15 RX ORDER — TRAZODONE HYDROCHLORIDE 100 MG/1
200 TABLET ORAL AT BEDTIME
Qty: 180 TABLET | Refills: 3 | Status: SHIPPED | OUTPATIENT
Start: 2021-01-15 | End: 2021-10-26

## 2021-01-15 ASSESSMENT — ANXIETY QUESTIONNAIRES
2. NOT BEING ABLE TO STOP OR CONTROL WORRYING: MORE THAN HALF THE DAYS
3. WORRYING TOO MUCH ABOUT DIFFERENT THINGS: NEARLY EVERY DAY
GAD7 TOTAL SCORE: 15
4. TROUBLE RELAXING: MORE THAN HALF THE DAYS
5. BEING SO RESTLESS THAT IT IS HARD TO SIT STILL: SEVERAL DAYS
6. BECOMING EASILY ANNOYED OR IRRITABLE: MORE THAN HALF THE DAYS
IF YOU CHECKED OFF ANY PROBLEMS ON THIS QUESTIONNAIRE, HOW DIFFICULT HAVE THESE PROBLEMS MADE IT FOR YOU TO DO YOUR WORK, TAKE CARE OF THINGS AT HOME, OR GET ALONG WITH OTHER PEOPLE: SOMEWHAT DIFFICULT
1. FEELING NERVOUS, ANXIOUS, OR ON EDGE: NEARLY EVERY DAY
7. FEELING AFRAID AS IF SOMETHING AWFUL MIGHT HAPPEN: MORE THAN HALF THE DAYS

## 2021-01-15 ASSESSMENT — PATIENT HEALTH QUESTIONNAIRE - PHQ9: SUM OF ALL RESPONSES TO PHQ QUESTIONS 1-9: 13

## 2021-01-19 ENCOUNTER — COMMUNICATION - HEALTHEAST (OUTPATIENT)
Dept: FAMILY MEDICINE | Facility: CLINIC | Age: 41
End: 2021-01-19

## 2021-01-21 ENCOUNTER — COMMUNICATION - HEALTHEAST (OUTPATIENT)
Dept: FAMILY MEDICINE | Facility: CLINIC | Age: 41
End: 2021-01-21

## 2021-01-22 ENCOUNTER — COMMUNICATION - HEALTHEAST (OUTPATIENT)
Dept: NURSING | Facility: CLINIC | Age: 41
End: 2021-01-22

## 2021-01-22 ENCOUNTER — OFFICE VISIT - HEALTHEAST (OUTPATIENT)
Dept: PHARMACY | Facility: CLINIC | Age: 41
End: 2021-01-22

## 2021-01-22 DIAGNOSIS — G47.00 INSOMNIA, UNSPECIFIED TYPE: ICD-10-CM

## 2021-01-22 DIAGNOSIS — F32.A ANXIETY AND DEPRESSION: ICD-10-CM

## 2021-01-22 DIAGNOSIS — F41.9 ANXIETY AND DEPRESSION: ICD-10-CM

## 2021-01-22 DIAGNOSIS — E11.65 TYPE 2 DIABETES MELLITUS WITH HYPERGLYCEMIA, WITHOUT LONG-TERM CURRENT USE OF INSULIN (H): ICD-10-CM

## 2021-01-22 PROCEDURE — 99605 MTMS BY PHARM NP 15 MIN: CPT | Performed by: PHARMACIST

## 2021-01-22 PROCEDURE — 99607 MTMS BY PHARM ADDL 15 MIN: CPT | Performed by: PHARMACIST

## 2021-02-17 ENCOUNTER — RECORDS - HEALTHEAST (OUTPATIENT)
Dept: ADMINISTRATIVE | Facility: OTHER | Age: 41
End: 2021-02-17

## 2021-02-17 LAB — RETINOPATHY: NEGATIVE

## 2021-02-23 ENCOUNTER — RECORDS - HEALTHEAST (OUTPATIENT)
Dept: HEALTH INFORMATION MANAGEMENT | Facility: CLINIC | Age: 41
End: 2021-02-23

## 2021-02-26 ENCOUNTER — COMMUNICATION - HEALTHEAST (OUTPATIENT)
Dept: FAMILY MEDICINE | Facility: CLINIC | Age: 41
End: 2021-02-26

## 2021-02-26 DIAGNOSIS — E78.5 HYPERLIPIDEMIA, UNSPECIFIED HYPERLIPIDEMIA TYPE: ICD-10-CM

## 2021-02-26 DIAGNOSIS — E11.65 TYPE 2 DIABETES MELLITUS WITH HYPERGLYCEMIA, WITHOUT LONG-TERM CURRENT USE OF INSULIN (H): ICD-10-CM

## 2021-02-28 RX ORDER — ATORVASTATIN CALCIUM 20 MG/1
TABLET, FILM COATED ORAL
Qty: 90 TABLET | Refills: 3 | Status: SHIPPED | OUTPATIENT
Start: 2021-02-28 | End: 2022-02-15

## 2021-03-18 ENCOUNTER — COMMUNICATION - HEALTHEAST (OUTPATIENT)
Dept: FAMILY MEDICINE | Facility: CLINIC | Age: 41
End: 2021-03-18

## 2021-04-14 ENCOUNTER — COMMUNICATION - HEALTHEAST (OUTPATIENT)
Dept: FAMILY MEDICINE | Facility: CLINIC | Age: 41
End: 2021-04-14

## 2021-04-21 ENCOUNTER — COMMUNICATION - HEALTHEAST (OUTPATIENT)
Dept: FAMILY MEDICINE | Facility: CLINIC | Age: 41
End: 2021-04-21

## 2021-04-22 ENCOUNTER — AMBULATORY - HEALTHEAST (OUTPATIENT)
Dept: NURSING | Facility: CLINIC | Age: 41
End: 2021-04-22

## 2021-04-22 DIAGNOSIS — Z23 ENCOUNTER FOR IMMUNIZATION: ICD-10-CM

## 2021-04-23 ENCOUNTER — AMBULATORY - HEALTHEAST (OUTPATIENT)
Dept: NURSING | Facility: CLINIC | Age: 41
End: 2021-04-23

## 2021-04-23 ENCOUNTER — OFFICE VISIT - HEALTHEAST (OUTPATIENT)
Dept: FAMILY MEDICINE | Facility: CLINIC | Age: 41
End: 2021-04-23

## 2021-04-23 ENCOUNTER — COMMUNICATION - HEALTHEAST (OUTPATIENT)
Dept: FAMILY MEDICINE | Facility: CLINIC | Age: 41
End: 2021-04-23

## 2021-04-23 DIAGNOSIS — F32.A ANXIETY AND DEPRESSION: ICD-10-CM

## 2021-04-23 DIAGNOSIS — F41.9 ANXIETY AND DEPRESSION: ICD-10-CM

## 2021-04-23 DIAGNOSIS — Z23 ENCOUNTER FOR IMMUNIZATION: ICD-10-CM

## 2021-04-23 DIAGNOSIS — E11.65 TYPE 2 DIABETES MELLITUS WITH HYPERGLYCEMIA, WITHOUT LONG-TERM CURRENT USE OF INSULIN (H): ICD-10-CM

## 2021-04-23 LAB — HBA1C MFR BLD: 12.2 %

## 2021-05-10 ENCOUNTER — AMBULATORY - HEALTHEAST (OUTPATIENT)
Dept: PHARMACY | Facility: CLINIC | Age: 41
End: 2021-05-10

## 2021-05-10 ENCOUNTER — OFFICE VISIT - HEALTHEAST (OUTPATIENT)
Dept: PHARMACY | Facility: CLINIC | Age: 41
End: 2021-05-10

## 2021-05-10 DIAGNOSIS — I10 ESSENTIAL HYPERTENSION: ICD-10-CM

## 2021-05-10 DIAGNOSIS — F41.9 ANXIETY AND DEPRESSION: ICD-10-CM

## 2021-05-10 DIAGNOSIS — E11.65 TYPE 2 DIABETES MELLITUS WITH HYPERGLYCEMIA, WITHOUT LONG-TERM CURRENT USE OF INSULIN (H): ICD-10-CM

## 2021-05-10 DIAGNOSIS — F32.A ANXIETY AND DEPRESSION: ICD-10-CM

## 2021-05-10 DIAGNOSIS — E78.5 HYPERLIPIDEMIA, UNSPECIFIED HYPERLIPIDEMIA TYPE: ICD-10-CM

## 2021-05-10 PROCEDURE — 99607 MTMS BY PHARM ADDL 15 MIN: CPT | Performed by: PHARMACIST

## 2021-05-10 PROCEDURE — 99606 MTMS BY PHARM EST 15 MIN: CPT | Performed by: PHARMACIST

## 2021-05-10 RX ORDER — BUSPIRONE HYDROCHLORIDE 5 MG/1
5 TABLET ORAL 2 TIMES DAILY
Qty: 60 TABLET | Refills: 3 | Status: SHIPPED | OUTPATIENT
Start: 2021-05-10 | End: 2021-07-27

## 2021-05-10 RX ORDER — FLASH GLUCOSE SENSOR
1 KIT MISCELLANEOUS
Qty: 2 KIT | Refills: 11 | Status: SHIPPED | OUTPATIENT
Start: 2021-05-10 | End: 2022-04-18

## 2021-05-17 ENCOUNTER — COMMUNICATION - HEALTHEAST (OUTPATIENT)
Dept: NURSING | Facility: CLINIC | Age: 41
End: 2021-05-17

## 2021-05-17 DIAGNOSIS — E11.65 TYPE 2 DIABETES MELLITUS WITH HYPERGLYCEMIA, WITHOUT LONG-TERM CURRENT USE OF INSULIN (H): ICD-10-CM

## 2021-05-26 ASSESSMENT — PATIENT HEALTH QUESTIONNAIRE - PHQ9
SUM OF ALL RESPONSES TO PHQ QUESTIONS 1-9: 17
SUM OF ALL RESPONSES TO PHQ QUESTIONS 1-9: 0

## 2021-05-27 ASSESSMENT — PATIENT HEALTH QUESTIONNAIRE - PHQ9
SUM OF ALL RESPONSES TO PHQ QUESTIONS 1-9: 13
SUM OF ALL RESPONSES TO PHQ QUESTIONS 1-9: 24
SUM OF ALL RESPONSES TO PHQ QUESTIONS 1-9: 20

## 2021-05-28 ASSESSMENT — ANXIETY QUESTIONNAIRES
GAD7 TOTAL SCORE: 1
GAD7 TOTAL SCORE: 15
GAD7 TOTAL SCORE: 13
GAD7 TOTAL SCORE: 21
GAD7 TOTAL SCORE: 20
GAD7 TOTAL SCORE: 13

## 2021-05-31 NOTE — TELEPHONE ENCOUNTER
RN cannot approve Refill Request    RN can NOT refill this medication Protocol failed and NO refill given. Last office visit: 7/10/2018 Leesa Noland NP Last Physical: Visit date not found Last MTM visit: Visit date not found Last visit same specialty: 7/10/2018 Leesa Noland NP.  Next visit within 3 mo: Visit date not found  Next physical within 3 mo: Visit date not found      Julisa Abrams, [facility] Triage/Med Refill 8/19/2019    Requested Prescriptions   Pending Prescriptions Disp Refills     lisinopril (PRINIVIL,ZESTRIL) 5 MG tablet [Pharmacy Med Name: LISINOPRIL 5MG TABLETS] 90 tablet 0     Sig: TAKE 1 TABLET BY MOUTH EVERY DAY       [employer] Inhibitors Refill Protocol Failed - 8/19/2019 12:14 PM        Failed - PCP or prescribing provider visit in past 12 months       Last office visit with prescriber/PCP: 7/10/2018 Leesa Noland NP OR same dept: Visit date not found OR same specialty: 7/10/2018 Leesa Noland NP  Last physical: Visit date not found Last MTM visit: Visit date not found   Next visit within 3 mo: Visit date not found  Next physical within 3 mo: Visit date not found  Prescriber OR PCP: Leesa Noland NP  Last diagnosis associated with med order: There are no diagnoses linked to this encounter.  If protocol passes may refill for 12 months if within 3 months of last provider visit (or a total of 15 months).             Failed - Serum Potassium in past 12 months     No results found for: LN-POTASSIUM          Failed - Blood pressure filed in past 12 months     BP Readings from Last 1 Encounters:   07/10/18 110/64             Failed - Serum Creatinine in past 12 months     Creatinine   Date Value Ref [place] Status   07/10/2018 0.93 0.70 - 1.30 mg/dL Final

## 2021-05-31 NOTE — TELEPHONE ENCOUNTER
Please call patient.  Refilled Lisinopril x 1 month.  Due for office visit and labs prior to any additional refills.    Leesa Noland NP

## 2021-06-01 VITALS — HEIGHT: 69 IN | WEIGHT: 295.8 LBS | BODY MASS INDEX: 43.81 KG/M2

## 2021-06-03 VITALS
BODY MASS INDEX: 46.65 KG/M2 | HEIGHT: 69 IN | HEART RATE: 74 BPM | TEMPERATURE: 97.6 F | SYSTOLIC BLOOD PRESSURE: 114 MMHG | DIASTOLIC BLOOD PRESSURE: 70 MMHG | WEIGHT: 315 LBS

## 2021-06-03 VITALS — BODY MASS INDEX: 47.85 KG/M2 | WEIGHT: 315 LBS

## 2021-06-04 VITALS
BODY MASS INDEX: 45.5 KG/M2 | HEART RATE: 90 BPM | SYSTOLIC BLOOD PRESSURE: 124 MMHG | WEIGHT: 308.13 LBS | TEMPERATURE: 98.4 F | DIASTOLIC BLOOD PRESSURE: 72 MMHG | OXYGEN SATURATION: 95 %

## 2021-06-04 VITALS
DIASTOLIC BLOOD PRESSURE: 76 MMHG | WEIGHT: 313 LBS | SYSTOLIC BLOOD PRESSURE: 132 MMHG | OXYGEN SATURATION: 97 % | HEART RATE: 96 BPM | BODY MASS INDEX: 46.22 KG/M2

## 2021-06-04 VITALS
DIASTOLIC BLOOD PRESSURE: 64 MMHG | WEIGHT: 315 LBS | SYSTOLIC BLOOD PRESSURE: 110 MMHG | BODY MASS INDEX: 46.67 KG/M2 | HEART RATE: 74 BPM

## 2021-06-04 VITALS
DIASTOLIC BLOOD PRESSURE: 71 MMHG | BODY MASS INDEX: 45.38 KG/M2 | HEART RATE: 73 BPM | SYSTOLIC BLOOD PRESSURE: 118 MMHG | WEIGHT: 307.3 LBS

## 2021-06-04 VITALS
BODY MASS INDEX: 44.45 KG/M2 | WEIGHT: 301 LBS | HEART RATE: 75 BPM | SYSTOLIC BLOOD PRESSURE: 125 MMHG | DIASTOLIC BLOOD PRESSURE: 84 MMHG

## 2021-06-04 VITALS
WEIGHT: 313 LBS | HEART RATE: 88 BPM | DIASTOLIC BLOOD PRESSURE: 76 MMHG | SYSTOLIC BLOOD PRESSURE: 110 MMHG | BODY MASS INDEX: 46.22 KG/M2

## 2021-06-04 VITALS
SYSTOLIC BLOOD PRESSURE: 118 MMHG | BODY MASS INDEX: 45.34 KG/M2 | DIASTOLIC BLOOD PRESSURE: 71 MMHG | WEIGHT: 307 LBS | HEART RATE: 73 BPM

## 2021-06-04 NOTE — TELEPHONE ENCOUNTER
RN cannot approve Refill Request    RN can NOT refill this medication PCP messaged that patient is overdue for Labs. Last office visit: 8/27/2019 Leesa Noland NP Last Physical: Visit date not found Last MTM visit: Visit date not found Last visit same specialty: 8/27/2019 Leesa Noland NP.  Next visit within 3 mo: Visit date not found  Next physical within 3 mo: Visit date not found      Altagracia Patterson, [facility] Triage/Med Refill 12/11/2019    Requested Prescriptions   Pending Prescriptions Disp Refills     metFORMIN (GLUCOPHAGE-XR) 500 MG 24 hr tablet [Pharmacy Med Name: METFORMIN ER 500MG 24HR TABS] 360 tablet 0     Sig: TAKE 2 TABLETS(1000 MG) BY MOUTH TWICE DAILY WITH MEALS       Metformin Refill Protocol Failed - 12/10/2019  9:15 AM        Failed - Microalbumin in last year      No results found for: MICROALBUR               Passed - Blood pressure in last 12 months     BP Readings from Last 1 Encounters:   08/27/19 128/82             Passed - LFT or AST or ALT in last 12 months     Albumin   Date Value Ref Range Status   08/27/2019 4.0 3.5 - 5.0 g/dL Final     Bilirubin, Total   Date Value Ref Range Status   08/27/2019 0.7 0.0 - 1.0 mg/dL Final     Alkaline Phosphatase   Date Value Ref Range Status   08/27/2019 132 (H) 45 - 120 U/L Final     AST   Date Value Ref Range Status   08/27/2019 61 (H) 0 - 40 U/L Final     ALT   Date Value Ref Range Status   08/27/2019 123 (H) 0 - 45 U/L Final     Protein, Total   Date Value Ref Range Status   08/27/2019 7.4 6.0 - 8.0 g/dL Final                Passed - GFR or Serum Creatinine in last 6 months     GFR MDRD Non Af Amer   Date Value Ref Range Status   08/27/2019 >60 >60 mL/min/1.73m2 Final     GFR MDRD Af Amer   Date Value Ref Range Status   08/27/2019 >60 >60 mL/min/1.73m2 Final             Passed - Visit with PCP or prescribing provider visit in last 6 months or next 3 months     Last office visit with prescriber/PCP: 8/27/2019 OR same dept: 8/27/2019  Wagstrom, Leesa E, NP OR same specialty: 8/27/2019 Leesa Noland NP Last physical: Visit date not found Last MTM visit: Visit date not found         Next appt within 3 mo: Visit date not found  Next physical within 3 mo: Visit date not found  Prescriber OR PCP: Leesa Noland NP  Last diagnosis associated with med order: 1. Type 2 diabetes mellitus with hyperglycemia, without long-term current use of insulin (H)  - metFORMIN (GLUCOPHAGE-XR) 500 MG 24 hr tablet [Pharmacy Med Name: METFORMIN ER 500MG 24HR TABS]; TAKE 2 TABLETS(1000 MG) BY MOUTH TWICE DAILY WITH MEALS  Dispense: 360 tablet; Refill: 0     If protocol passes may refill for 12 months if within 3 months of last provider visit (or a total of 15 months).           Passed - A1C in last 6 months     Hemoglobin A1c   Date Value Ref [place] Status   08/27/2019 8.7 (H) 3.5 - 6.0 % Final

## 2021-06-04 NOTE — TELEPHONE ENCOUNTER
Patient is due for a recheck and office visit.  Please help him schedule.  Let me know if he would like a bridge of medications until then.    Leesa Noland NP

## 2021-06-04 NOTE — TELEPHONE ENCOUNTER
RN cannot approve Refill Request    RN can NOT refill this medication PCP messaged that patient is overdue for Labs. Last office visit: 8/27/2019 Leesa Noland NP Last Physical: Visit date not found Last MTM visit: Visit date not found Last visit same specialty: 8/27/2019 Leesa Noland NP.  Next visit within 3 mo: Visit date not found  Next physical within 3 mo: Visit date not found      Wendy GUZMÁN Stacey, [facility] Triage/Med Refill 12/10/2019    Requested Prescriptions   Pending Prescriptions Disp Refills     metFORMIN (GLUCOPHAGE-XR) 500 MG 24 hr tablet [Pharmacy Med Name: METFORMIN ER 500MG 24HR TABS] 180 tablet 0     Sig: TAKE 1 TABLET(500 MG) BY MOUTH TWICE DAILY WITH MEALS       Metformin Refill Protocol Failed - 12/9/2019  9:48 AM        Failed - Microalbumin in last year      No results found for: MICROALBUR               Passed - Blood pressure in last 12 months     BP Readings from Last 1 Encounters:   08/27/19 128/82             Passed - LFT or AST or ALT in last 12 months     Albumin   Date Value Ref Range Status   08/27/2019 4.0 3.5 - 5.0 g/dL Final     Bilirubin, Total   Date Value Ref Range Status   08/27/2019 0.7 0.0 - 1.0 mg/dL Final     Alkaline Phosphatase   Date Value Ref Range Status   08/27/2019 132 (H) 45 - 120 U/L Final     AST   Date Value Ref Range Status   08/27/2019 61 (H) 0 - 40 U/L Final     ALT   Date Value Ref Range Status   08/27/2019 123 (H) 0 - 45 U/L Final     Protein, Total   Date Value Ref Range Status   08/27/2019 7.4 6.0 - 8.0 g/dL Final                Passed - GFR or Serum Creatinine in last 6 months     GFR MDRD Non Af Amer   Date Value Ref Range Status   08/27/2019 >60 >60 mL/min/1.73m2 Final     GFR MDRD Af Amer   Date Value Ref [place] Status   08/27/2019 >60 >60 mL/min/1.73m2 Final             Passed - Visit with PCP or prescribing provider visit in last 6 months or next 3 months     Last office visit with prescriber/PCP: 8/27/2019 OR same dept: 8/27/2019  Wagstrom, Leesa E, NP OR same specialty: 8/27/2019 Leesa Noland NP Last physical: Visit date not found Last MTM visit: Visit date not found         Next appt within 3 mo: Visit date not found  Next physical within 3 mo: Visit date not found  Prescriber OR PCP: Leesa Noland NP  Last diagnosis associated with med order: 1. Type 2 diabetes mellitus with hyperglycemia, without long-term current use of insulin (H)  - metFORMIN (GLUCOPHAGE-XR) 500 MG 24 hr tablet [Pharmacy Med Name: METFORMIN ER 500MG 24HR TABS]; TAKE 1 TABLET(500 MG) BY MOUTH TWICE DAILY WITH MEALS  Dispense: 180 tablet; Refill: 0     If protocol passes may refill for 12 months if within 3 months of last provider visit (or a total of 15 months).           Passed - A1C in last 6 months     Hemoglobin A1c   Date Value Ref [place] Status   08/27/2019 8.7 (H) 3.5 - 6.0 % Final

## 2021-06-05 VITALS
SYSTOLIC BLOOD PRESSURE: 135 MMHG | DIASTOLIC BLOOD PRESSURE: 84 MMHG | WEIGHT: 298 LBS | HEART RATE: 105 BPM | BODY MASS INDEX: 44.01 KG/M2

## 2021-06-05 VITALS
WEIGHT: 313.3 LBS | SYSTOLIC BLOOD PRESSURE: 124 MMHG | DIASTOLIC BLOOD PRESSURE: 84 MMHG | HEART RATE: 80 BPM | BODY MASS INDEX: 46.27 KG/M2

## 2021-06-05 NOTE — PATIENT INSTRUCTIONS - HE
It was nice to see you today. I value your experience during your clinic visits and would be very thankful for your time with providing feedback if you receive a survey via email. Let me know personally if your experience today was not exceptional so that we can serve you better in the future!     Recommendations from today's Medication Management (MTM) visit:                                                      1. Start Rybelsus. You will take 3 mg every morning for the first 30 days, then increase to 7 mg every morning. Keep the medication in the blister pack until you are ready to take it (don't put in a pillbox). Take it on an empty stomach when you first wake up. Take with no more than 4 oz of water. Wait 30 minutes before eating, drinking, or taking other medication.     Text READY to 56314 to get co-pay savings.    2. Decrease the metformin ER to 1 tablet (500 mg) every evening with dinner. If you are still having nausea and diarrhea, stop completely.     Blood Sugar Goals  Before meals:   2 hours after meal: less than 180     3. Increase the sertraline to 100 mg daily for anxiety.     Next MTM appointment:                                                       Monday, March 9th at 7:30 AM     To schedule another MTM appointment, please call the clinic directly at [phone] or schedule via [employer].     My MTM pharmacist's contact information:                                                      Please feel free to contact me with any questions or concerns you have.      Stephy Oglesby, PharmD, [facility]  Medication Management (MTM) Pharmacist  [facility]  [phone]

## 2021-06-05 NOTE — TELEPHONE ENCOUNTER
Called and gave Pt message he understood and will strat with decreasing medication and follow up if no improvement

## 2021-06-05 NOTE — TELEPHONE ENCOUNTER
RN Triage:    Spoke with 39 yr old Jose who has questions about having nausea since increasing Metformin to two tablets with meals on 1/6/20.    Reports:    Nauseated occasionally, usually in the morning, and has vomited once within the past 3 weeks.    BS is generally around 130 fasting, and 180 after meals.     Nausea often happens about 2 hours after taking metformin and eating a meal.  Nausea also occurs when he is working out 3 times weekly.    Pt has not taken his BS when he feels nauseated.    Pt states he has lost weight since 1/1/20, but doesn't know how much he has lost.    PLAN:  Will consult with PCP per protocol regarding nausea with current dosage of Metformin please advise.  Advised he may want to keep a record of BS when nauseated and also keep track of weight.  Advised to treat BS under 70 with glucose.  Advised to call back if symptoms are worsening.    Sarah Galarza RN   [facility] RN Triage      Reason for Disposition    Caller has NON-URGENT medication question about med that PCP prescribed and triager unable to answer question    Protocols used: MEDICATION QUESTION CALL-A-

## 2021-06-05 NOTE — PATIENT INSTRUCTIONS - HE
Go back to Metformin - 1 tablet with breakfast, 1 tablet with dinner    You will get a phone call to get scheduled without our pharmacist

## 2021-06-05 NOTE — PROGRESS NOTES
Assessment/Plan:     1. Type 2 diabetes mellitus with hyperglycemia, without long-term current use of insulin (H)  - Ambulatory referral to Medication Management  - metFORMIN (GLUCOPHAGE XR) 500 MG 24 hr tablet; Take 1 tablet (500 mg total) by mouth 2 (two) times a day with meals.  Dispense: 360 tablet; Refill: 1    2. Dizziness    3. Diarrhea, unspecified type    Suspect symptoms are secondary to his recent increase in metformin.  We will have him go back and down to the previous dose of 500 mg twice daily with meals.  I do suspect the dizziness is from some dehydration, and I encouraged him to push fluids.  I do think he needs additional medication for his diabetes.  Consider a GLP-1, as this would also help with weight loss.  I did place an MTM referral to discuss initiating this.  Patient agrees with plan of care.    Subjective:     Jose Morillo III is a 39 y.o. male who presents with concerns regarding dizziness and diarrhea.  Patient has been getting intermittent dizziness over the last couple of weeks.  Is also been having persistent diarrhea, and has had a couple of episodes of vomiting.  Denies any fever or significant abdominal pain.  History of type 2 diabetes, that has recently been worsening in control.  We did recently increase his metformin XR to 1000 mg twice daily.  Patient denies any chest pain or shortness of breath.  He has been having some mild headaches.  He is feeling more fatigued than normal.  He has been having some intermittent nausea.  Denies any hypoglycemic episodes, but has not checked his blood sugars when he is symptomatic.  Patient usually eats 3 meals per day.  He rarely eats snacks.  Hydration has been difficult for him.      The following portions of the patient's history were reviewed and updated as appropriate: allergies, current medications.    Review of Systems  A comprehensive review of systems was performed and was otherwise negative    Objective:     /71    Pulse 73   Wt (!) 307 lb 4.8 oz (139.4 kg)   BMI 45.38 kg/m      General Appearance: Alert, cooperative, no distress, appears stated age  Back: no CVA tenderness  Lungs: Clear to auscultation bilaterally, respirations unlabored  Heart: Regular rate and rhythm, S1 and S2 normal, no murmur, rub, or gallop   Abdomen: slightly firm, non-tender, bowel sounds active all four quadrants.      Leesa Noland, NP

## 2021-06-05 NOTE — TELEPHONE ENCOUNTER
Received MTM referral from clinic visit at [facility]    Patient was not reachable after several attempts, will route to MTM Pharmacist/Provider as an FYI. Left MTM scheduling information on patients voicemail.    Thank you for the referral,    Alice Bird

## 2021-06-05 NOTE — TELEPHONE ENCOUNTER
Patient may try to decrease the Metformin to 2 tablets in the morning with breakfast and 1 in the evening.  If he is still having nausea, then he can go back to 1 tablet twice daily with meals.    I recommend that he check his BS when he is feeling nauseated.     If symptoms not improving, please see me in clinic.    Leesa Noland NP

## 2021-06-05 NOTE — PROGRESS NOTES
"Assessment/Plan:     1. Type 2 diabetes mellitus with hyperglycemia, without long-term current use of insulin (H)  Hgb A1c is elevated from last at 10.1.  Likely from being off Metformin.  Patient will restart Metformin at goal dose of 1000 mg twice daily with meals.  CMP and urine microalbumin pending.  Follow up in 3 months.  If no progress, will consider adding a GLP-1.  Encouraged patient to schedule an eye exam  - Microalbumin, Random Urine  - Glycosylated Hemoglobin A1c  - Comprehensive Metabolic Panel  - metFORMIN (GLUCOPHAGE XR) 500 MG 24 hr tablet; Take 2 tablets (1,000 mg total) by mouth 2 (two) times a day with meals.  Dispense: 360 tablet; Refill: 1  - HM DIABETES FOOT EXAM    2. Morbid obesity (H)      Subjective:     Jose Morillo III is a 39 y.o. male who presents for a diabetic check.  Patient was found to be diabetic at his last physical.  He had previously been diagnosed with Type 2 Diabetes, but was unable to stay of medication with diet and exercise.  Patient started Metformin, although he has only been taking 500 mg twice daily.  He ran out of medication, and has not been taking for the last month.  He has noticed increased urinary frequency.  He did get a new job, which has taken some stress away.  He has not had his eyes checked in over a year.  Diet has been poor.       The following portions of the patient's history were reviewed and updated as appropriate: allergies, current medications.    Review of Systems  A comprehensive review of systems was performed and was otherwise negative    Objective:     /70   Pulse 74   Temp 97.6  F (36.4  C)   Ht 5' 9\" (1.753 m)   Wt (!) 316 lb 9.6 oz (143.6 kg)   BMI 46.75 kg/m      General Appearance: Alert, cooperative, no distress, appears stated age  Eyes: PERRL, conjunctiva/corneas clear, EOM's intact  Lungs: Clear to auscultation bilaterally, respirations unlabored  Heart: Regular rate and rhythm, S1 and S2 normal, no murmur, rub, or " gallop  Extremities: Extremities normal, atraumatic, no cyanosis or edema. Normal bilateral foot exam      Leesa Noland NP

## 2021-06-05 NOTE — PROGRESS NOTES
MTM Initial Encounter  Assessment & Plan                                                     1. Type 2 diabetes mellitus   Uncontrolled as evidenced by most recent A1c of 10.1%, above goal of <7% per ADA guidelines. Reported blood sugars have improved greatly with lifestyle modification, which I congratulated him on and encouraged to continue. Unfortunately he is not tolerating lower dose of metformin. Advised to cut back further to 500 mg daily and if diarrhea/GI upset continues, stop completely. Discussed other medication options to help with blood sugar control, weight loss, and CV risk reduction, such as GLP-1 agonist. With his insurance, copay is high for these medications. Suggested new oral GLP-1 agonist, Rybelsus. He would qualify for savings program to reduce copay to $10 per month. Medication education and counseling was provided, including indication, expected effect, dosing instructions, and possible side effects. The patient's questions were answered.   Education provided on blood sugar goals, fasting of  and 2 hours post-prandial of <180. He is not interested in CGM at this time.   Plan   1. Start Rybelsus. Take 3 mg every morning for the first 30 days, then increase to 7 mg every morning. Keep the medication in the blister pack until you are ready to take it (don't put in a pillbox). Take it on an empty stomach when you first wake up. Take with no more than 4 oz of water. Wait 30 minutes before eating, drinking, or taking other medication.     2. Decrease the metformin ER to 1 tablet (500 mg) every evening with dinner. If you are still having nausea and diarrhea, stop completely.     2. Essential hypertension  Blood pressure is well controlled and meeting goal of <140/90 mm Hg per JNC-8 hypertension guidelines.     3. Hyperlipidemia  Appropriately on a moderate intensity statin given age and diabetes diagnosis per ACC/AHA guidelines. Last LDL of 93 mg/dl.     4. Anxiety and depression  Partially  controlled with sertraline 50 mg daily. Last GILDARDO-7 elevated at 11. Occasionally takes two 50 mg tablets daily to help with anxiety symptoms. Recommend dose increase to 100 mg daily. Patient is agreeable.   Plan   1. Increase sertraline to 100 mg daily.     Follow Up  Return in about 1 month (around 3/5/2020).      Subjective & Objective                                                     Jose Morillo III is a 39 y.o. male coming in for an initial visit for Medication Therapy Management. He was referred to me from Leesa Noland NP    Chief Complaint: uncontrolled diabetes  Medication Adherence/Access: Good; no issues identified.     1. Type 2 diabetes mellitus   Misael is taking metformin  mg two times a day. He says metformin upsets his stomach and causes diarrhea. Symptoms were worse at 2000 mg per day. Still having diarrhea at lower dose. Since high A1c, he is working on healthy eating. He has lost about 10 pounds. Works at [employer]. Does exercise regularly doing [employer]. He says he knows what to do, but he has been falling off of healthy eating patterns lately. Checks BG a few times a week and usually range 120-140. Most recent readings: 130, 128, 132, 136, 109, 162.    Lab Results   Component Value Date    HGBA1C 10.1 (H) 01/06/2020    HGBA1C 8.7 (H) 08/27/2019    HGBA1C 5.8 07/10/2018     Lab Results   Component Value Date    MICROALBUR <0.50 01/06/2020    LDLCALC 93 08/27/2019    CREATININE 0.96 01/06/2020       2. Essential hypertension  Misael is taking lisinopril 5 mg daily. He understands this is for blood pressure. No cough or other issues.     3. Hyperlipidemia  Misael is taking atorvastatin 20 mg every evening. He understands this is for cholesterol treatment. No adverse effects noted.     The 10-year ASCVD risk score (Cedrick LEIGHTON Jr., et al., 2013) is: 2.3%    Values used to calculate the score:      Age: 39 years      Sex: Male      Is Non- : No      Diabetic:  Yes      Tobacco smoker: No      Systolic Blood Pressure: 118 mmHg      Is BP treated: Yes      HDL Cholesterol: 37 mg/dL      Total Cholesterol: 167 mg/dL    4. Anxiety and depression  Misael is taking sertraline 50 mg daily. He believes he's been on this medication for a couple years. He also takes lorazepam 0.5 mg as needed for anxiety, but takes this rarely, such as before flying. He admits to sometimes taking 2 of the sertraline tablets when he is more anxious. Overall he feels it is helpful, but there is room for improvement.     PHQ-9 Total Score: 5 (8/27/2019  3:00 PM)    GILDARDO 7 Total Score: 11 (8/27/2019  3:00 PM)      PMH: reviewed in EPIC   Allergies/ADRs: reviewed in EPIC   Alcohol: no   Tobacco:   Social History     Tobacco Use   Smoking Status Never Smoker   Smokeless Tobacco Never Used     Today's Vitals:   BP Readings from Last 3 Encounters:   02/05/20 118/71   01/28/20 118/71   01/06/20 114/70     Pulse Readings from Last 3 Encounters:   02/05/20 73   01/28/20 73   01/06/20 74     Wt Readings from Last 3 Encounters:   02/05/20 (!) 307 lb (139.3 kg)   01/28/20 (!) 307 lb 4.8 oz (139.4 kg)   01/06/20 (!) 316 lb 9.6 oz (143.6 kg)     ----------------    The patient was given a summary of these recommendations as an after visit summary    I spent 45 minutes with this patient today.   All changes were made via collaborative practice agreement with Leesa Noland NP. A copy of the visit note was provided to the patient's provider.     Stephy Oglesby, PharmD, BCACP  Medication Management (MTM) Pharmacist  [facility]        Current Outpatient Medications   Medication Sig Dispense Refill     aspirin 81 MG EC tablet Take 81 mg by mouth daily.       atorvastatin (LIPITOR) 20 MG tablet Take 1 tablet (20 mg total) by mouth at bedtime. 90 tablet 3     blood glucose meter (GLUCOMETER) Use 1 each As Directed as needed. Dispense glucometer brand per patient's insurance at  pharmacy discretion. 1 each 0     blood glucose test strips Use 1 each As Directed 2 (two) times a day as needed. Dispense brand per patient's insurance at pharmacy discretion. 100 strip 2     generic lancets (FINGERSTIX LANCETS) Use 1 each As Directed 2 (two) times a day as needed. Dispense brand per patient's insurance at pharmacy discretion. 100 each 2     lisinopril (PRINIVIL,ZESTRIL) 5 MG tablet Take 1 tablet (5 mg total) by mouth daily. 90 tablet 3     LORazepam (ATIVAN) 0.5 MG tablet Take 1 tablet by mouth 30 minutes prior to flying. May repeat the dose x1. 10 tablet 0     metFORMIN (GLUCOPHAGE XR) 500 MG 24 hr tablet Take 1 tablet (500 mg total) by mouth 2 (two) times a day with meals. 360 tablet 1     semaglutide (RYBELSUS) 3 mg tablet Take 3 mg by mouth daily before breakfast. Then increase to 7 mg daily. 30 tablet 0     semaglutide (RYBELSUS) 7 mg tablet Take 7 mg by mouth daily before breakfast. 30 tablet 11     sertraline (ZOLOFT) 100 MG tablet Take 1 tablet (100 mg total) by mouth daily. 90 tablet 3     No current facility-administered medications for this visit.

## 2021-06-06 NOTE — TELEPHONE ENCOUNTER
Refill Approved    Rx renewed per Medication Renewal Policy. Medication was last renewed on 8/27/19.    Noni Barlow, [facility] Triage/Med Refill 2/19/2020     Requested Prescriptions   Pending Prescriptions Disp Refills     atorvastatin (LIPITOR) 20 MG tablet [Pharmacy Med Name: ATORVASTATIN 20MG TABLETS] 90 tablet 3     Sig: TAKE 1 TABLET BY MOUTH EVERY DAY AT BEDTIME FOR CHOLESTEROL       Statins Refill Protocol (Hmg CoA Reductase Inhibitors) Passed - 2/15/2020  6:42 PM        Passed - PCP or prescribing provider visit in past 12 months      Last office visit with prescriber/PCP: 1/28/2020 Leesa Noland NP OR same dept: 1/28/2020 Leesa Noland NP OR same specialty: 1/28/2020 Leesa Noland NP  Last physical: Visit date not found Last MTM visit: Visit date not found   Next visit within 3 mo: Visit date not found  Next physical within 3 mo: Visit date not found  Prescriber OR PCP: Leesa Noland NP  Last diagnosis associated with med order: 1. Hyperlipidemia, unspecified hyperlipidemia type  - atorvastatin (LIPITOR) 20 MG tablet [Pharmacy Med Name: ATORVASTATIN 20MG TABLETS]; TAKE 1 TABLET BY MOUTH EVERY DAY AT BEDTIME FOR CHOLESTEROL  Dispense: 90 tablet; Refill: 3    If protocol passes may refill for 12 months if within 3 months of last provider visit (or a total of 15 months).

## 2021-06-06 NOTE — PROGRESS NOTES
MTM Follow up Encounter  Assessment & Plan                                                     1. Type 2 diabetes mellitus   Uncontrolled as evidenced by most recent A1c of 10.1%, above goal of <7% per ADA guidelines, but anticipate improvement with recent medication changes and lifestyle modification. Lowering dose of metformin has improved tolerability. Also tolerating oral GLP-1 agonist, Rybelsus. Continue with titration to 7 mg daily. If needed, can increase further to 14 mg daily. Copay card for Rybelsus will  in July. At that time, can change to injectable weekly GLP-1 agonist, Rybelsus.    Plan   1. Increase Rybelsus to 7 mg daily.     2. Essential hypertension  Blood pressure is well controlled and meeting goal of <140/90 mm Hg per JNC-8 hypertension guidelines.     3. Hyperlipidemia  Appropriately on a moderate intensity statin given age and diabetes diagnosis per ACC/AHA guidelines. Last LDL of 93 mg/dl.     4. Anxiety and depression  Improved control with dose increase in sertraline. GILDARDO-7 improved from 11 to 1 and PHQ-9 improved from 5 to 0.     Follow Up  Return in about 3 months (around 2020).      Subjective & Objective                                                     Jose Morillo III is a 39 y.o. male coming in for a follow up visit for Medication Therapy Management. He was referred to me from Leesa Noland NP    Chief Complaint: uncontrolled diabetes - medication follow up  Medication Adherence/Access: Good; no issues identified.     1. Type 2 diabetes mellitus   Misael is taking metformin  mg once a day and Rybelsus 3 mg daily.  At her last visit, we decreased dose of metformin due to GI side effects and added oral GLP-1 agonist, Rybelsus.  He says he is no longer having upset stomach or diarrhea with lower dose of metformin.  Denies any nausea or side effects with Rybelsus.  He verifies taking the medication as directed on an empty stomach with no more than 4 ounces of  water in the morning.  He admits he has not been checking blood sugars much lately.  Has not noticed much change in his appetite, but is losing weight. Since high A1c, he is working on healthy eating. Works at [employer]. Does exercise regularly doing [employer].     Lab Results   Component Value Date    HGBA1C 10.1 (H) 01/06/2020    HGBA1C 8.7 (H) 08/27/2019    HGBA1C 5.8 07/10/2018     Lab Results   Component Value Date    MICROALBUR <0.50 01/06/2020    LDLCALC 93 08/27/2019    CREATININE 0.96 01/06/2020       2. Essential hypertension  Misael is taking lisinopril 5 mg daily. He understands this is for blood pressure. No cough or other issues.     3. Hyperlipidemia  Misael is taking atorvastatin 20 mg every evening. He understands this is for cholesterol treatment. No adverse effects noted.     The 10-year ASCVD risk score (Tulsaedilma MANZANARES Jr., et al., 2013) is: 2.6%    Values used to calculate the score:      Age: 39 years      Sex: Male      Is Non- : No      Diabetic: Yes      Tobacco smoker: No      Systolic Blood Pressure: 125 mmHg      Is BP treated: Yes      HDL Cholesterol: 37 mg/dL      Total Cholesterol: 167 mg/dL    4. Anxiety and depression  Misael is taking sertraline 100 mg daily. We increased his dose at our last visit due to more anxiety lately. He has noticed a positive difference. He also takes lorazepam 0.5 mg as needed for anxiety, but takes this rarely, such as before flying.    PHQ-9 Total Score: 0 (3/9/2020  7:00 AM)    GILDARDO 7 Total Score: 1 (3/9/2020  7:00 AM)      PMH: reviewed in EPIC   Allergies/ADRs: reviewed in EPIC   Alcohol: no   Tobacco:   Social History     Tobacco Use   Smoking Status Never Smoker   Smokeless Tobacco Never Used     Today's Vitals:   BP Readings from Last 3 Encounters:   03/09/20 125/84   02/05/20 118/71   01/28/20 118/71     Pulse Readings from Last 3 Encounters:   03/09/20 75   02/05/20 73   01/28/20 73     Wt Readings from Last 3 Encounters:    03/09/20 (!) 301 lb (136.5 kg)   02/05/20 (!) 307 lb (139.3 kg)   01/28/20 (!) 307 lb 4.8 oz (139.4 kg)     ----------------    The patient was given a summary of these recommendations via [employer]    I spent 30 minutes with this patient today.   All changes were made via collaborative practice agreement with Leesa Noland NP. A copy of the visit note was provided to the patient's provider.     Stephy Oglesby, PharmD, BCACP  Medication Management (MTM) Pharmacist  [facility]        Current Outpatient Medications   Medication Sig Dispense Refill     aspirin 81 MG EC tablet Take 81 mg by mouth daily.       atorvastatin (LIPITOR) 20 MG tablet TAKE 1 TABLET BY MOUTH EVERY DAY AT BEDTIME FOR CHOLESTEROL 90 tablet 3     blood glucose meter (GLUCOMETER) Use 1 each As Directed as needed. Dispense glucometer brand per patient's insurance at pharmacy discretion. 1 each 0     blood glucose test strips Use 1 each As Directed 2 (two) times a day as needed. Dispense brand per patient's insurance at pharmacy discretion. 100 strip 2     generic lancets (FINGERSTIX LANCETS) Use 1 each As Directed 2 (two) times a day as needed. Dispense brand per patient's insurance at pharmacy discretion. 100 each 2     lisinopril (PRINIVIL,ZESTRIL) 5 MG tablet Take 1 tablet (5 mg total) by mouth daily. 90 tablet 3     LORazepam (ATIVAN) 0.5 MG tablet Take 1 tablet by mouth 30 minutes prior to flying. May repeat the dose x1. 10 tablet 0     metFORMIN (GLUMETZA) 500 MG (MOD) 24 hr tablet Take 500 mg by mouth every evening.       semaglutide (RYBELSUS) 7 mg tablet Take 7 mg by mouth daily before breakfast. 30 tablet 11     sertraline (ZOLOFT) 100 MG tablet Take 1 tablet (100 mg total) by mouth daily. 90 tablet 3     No current facility-administered medications for this visit.

## 2021-06-08 NOTE — TELEPHONE ENCOUNTER
I placed lab orders.  Yes, please have him schedule a lab-only appointment prior to his video visit.  Thanks!    Leesa Noland NP

## 2021-06-08 NOTE — PROGRESS NOTES
"Jose Morillo III is a 40 y.o. male who is being evaluated via a billable telephone visit.      The patient has been notified of following:     \"This telephone visit will be conducted via a call between you and your physician/provider. We have found that certain health care needs can be provided without the need for a physical exam.  This service lets us provide the care you need with a short phone conversation.  If a prescription is necessary we can send it directly to your pharmacy.  If lab work is needed we can place an order for that and you can then stop by our lab to have the test done at a later time.    Telephone visits are billed at different rates depending on your insurance coverage. During this emergency period, for some insurers they may be billed the same as an in-person visit.  Please reach out to your insurance provider with any questions.    If during the course of the call the physician/provider feels a telephone visit is not appropriate, you will not be charged for this service.\"    Patient has given verbal consent to a Telephone visit? Yes    What phone number would you like to be contacted at? [phone]    Patient would like to receive their AVS by AVS Preference: Vilma.    Patient presents via telephone visit for a diabetic check.  He has been working with [facility] to get better control.  His last hemoglobin A1c was 5.8, which is down from 10.1.  He has very well to oral Rybelsus, and is tolerating the medication well.    Patient met with the pharmacist prior to her visit today.  He has had some increased anxiety secondary to the current pandemic as well as unrest in our city.  He was instructed to increase his sertraline to 150 mg daily.  He is mostly having trouble with sleep at night.  This is been going on for the last couple of months.  He seems to fall asleep okay, but will wake up frequently throughout the night.  This usually happens on work nights only.  If he does not have to be " at work the next day, he sleeps just fine.  This is very frustrating to him.  He does think it is anxiety related.    Assessment/Plan:  1. Type 2 diabetes mellitus with hyperglycemia, without long-term current use of insulin (H)  Excellent control.  Continue medications per MTM.  Follow up in 6 months.     2. Anxiety and depression  Suspect sleeping issues are primarily secondary to anxiety.  Patient agrees.  Agree with increased dose of sertraline per MTM.  Discussed sleep hygiene, mainly avoiding screens or stimulants prior to sleep.  Prescribed hydroxyzine to use as needed for anxiety and/or sleep.  Recommend taking this prior to bedtime, as it will cause drowsiness.  Follow-up with any new or worsening symptoms.  - hydrOXYzine pamoate (VISTARIL) 50 MG capsule; Take 1-2 capsules ( mg total) by mouth 3 (three) times a day as needed for anxiety.  Dispense: 60 capsule; Refill: 0        Phone call duration:  9 minutes  Start: 812  End: 821    Leesa Noland NP
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
## 2021-06-09 NOTE — PATIENT INSTRUCTIONS - HE
Mayes's neuroma    Continue with ice, NSAIDS, (naproxen), rest, metatarsal pads, wide fitting shoes, see podiatrist if not improving

## 2021-06-09 NOTE — PROGRESS NOTES
Assessment/Plan:     1. Left foot pain  XR Foot Left 3 or More VWS    naproxen (NAPROSYN) 500 MG tablet    Ambulatory referral to Podiatry   2. Mayes's neuroma of left foot         Diagnoses and all orders for this visit:    Left foot pain  -     XR Foot Left 3 or More VWS; Future; Expected date: 07/03/2020  -     naproxen (NAPROSYN) 500 MG tablet; Take 1 tablet (500 mg total) by mouth 2 (two) times a day as needed.  Dispense: 30 tablet; Refill: 1  -     Ambulatory referral to Podiatry    Mayes's neuroma of left foot    Discussed x-ray findings with patient.  Suspect he may have a Mayes's neuroma of the foot.  Continue with conservative cares including rest, ice and NSAIDs.  Prescription for naproxen sent to pharmacy.  Advised trial of metatarsal pads.  Recommend wide fitting shoes.  See podiatrist if not improving with these measures.  Referral placed.           Subjective:      Jose Morillo III is a 40 y.o. male who comes in today for concern of left foot pain.  He is a diabetic.  States that last evening he started noticing pain in the outer part of his left foot.  Denies any trauma or injury to the foot.  States he was out in the yard doing yard work yesterday.  Started noticing some discomfort last evening before bed and when he woke up this morning pain was worse.  It is on the outer part of his left foot and does extend underneath the third fourth and fifth toe.  He feels it is a little bit swollen because it is tight when he puts his shoes on.  He has not noticed any redness or rash.  He has had difficulty walking.  He has been icing.  He took ibuprofen.  He applied Tiger balm.  Nothing is giving him any relief of discomfort.  States that he does have a high pain tolerance and this is bugging him.  Pain is sharp and burning.  No numbness or tingling.  Worse with walking.  Better with rest.  He is otherwise feeling fine.  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  Review of systems is otherwise negative.  No other questions today.    Current Outpatient Medications   Medication Sig Dispense Refill     aspirin 81 MG EC tablet Take 81 mg by mouth daily.       atorvastatin (LIPITOR) 20 MG tablet TAKE 1 TABLET BY MOUTH EVERY DAY AT BEDTIME FOR CHOLESTEROL 90 tablet 3     blood glucose test strips Use 1 each As Directed 2 (two) times a day as needed. Dispense brand per patient's insurance at pharmacy discretion. 100 strip 2     generic lancets (FINGERSTIX LANCETS) Use 1 each As Directed 2 (two) times a day as needed. Dispense brand per patient's insurance at pharmacy discretion. 100 each 2     hydrOXYzine pamoate (VISTARIL) 50 MG capsule Take 1-2 capsules ( mg total) by mouth 3 (three) times a day as needed for anxiety. 60 capsule 0     lisinopriL (PRINIVIL,ZESTRIL) 5 MG tablet Take 1 tablet (5 mg total) by mouth daily. 90 tablet 3     LORazepam (ATIVAN) 0.5 MG tablet Take 1 tablet by mouth 30 minutes prior to flying. May repeat the dose x1. 10 tablet 0     metFORMIN (GLUMETZA) 500 MG (MOD) 24 hr tablet Take 500 mg by mouth every evening.       semaglutide (RYBELSUS) 7 mg tablet Take 7 mg by mouth daily before breakfast. 30 tablet 11     sertraline (ZOLOFT) 100 MG tablet Take 1.5 tablets (150 mg total) by mouth daily. 145 tablet 3     naproxen (NAPROSYN) 500 MG tablet Take 1 tablet (500 mg total) by mouth 2 (two) times a day as needed. 30 tablet 1     No current facility-administered medications for this visit.        Past Medical History, Family History, and Social History reviewed.  No past medical history on file.  Past Surgical History:   Procedure Laterality Date     ORIF ANKLE FRACTURE Right      Patient has no known allergies.  Family History   Problem Relation Age of Onset     Hyperlipidemia Mother      Diabetes Father      Diabetes type I Brother      Social History     Socioeconomic History     Marital status:      Spouse name: Not on file      Number of children: 3     Years of education: Not on file     Highest education level: Not on file   Occupational History     Occupation: Warehouse   Social Needs     Financial resource strain: Not on file     Food insecurity     Worry: Not on file     Inability: Not on file     Transportation needs     Medical: Not on file     Non-medical: Not on file   Tobacco Use     Smoking status: Never Smoker     Smokeless tobacco: Never Used   Substance and Sexual Activity     Alcohol use: No     Drug use: No     Sexual activity: Yes     Partners: Female     Birth control/protection: None   Lifestyle     Physical activity     Days per week: Not on file     Minutes per session: Not on file     Stress: Not on file   Relationships     Social connections     Talks on phone: Not on file     Gets together: Not on file     Attends Yarsani service: Not on file     Active member of club or organization: Not on file     Attends meetings of clubs or organizations: Not on file     Relationship status: Not on file     Intimate partner violence     Fear of current or ex partner: Not on file     Emotionally abused: Not on file     Physically abused: Not on file     Forced sexual activity: Not on file   Other Topics Concern     Not on file   Social History Narrative     Not on file         Review of systems is as stated in HPI, and the remainder of the 10 system review is otherwise negative.    Objective:     Vitals:    07/03/20 1251   BP: 124/72   Patient Site: Left Arm   Patient Position: Sitting   Cuff Size: Adult Large   Pulse: 90   Temp: 98.4  F (36.9  C)   TempSrc: Oral   SpO2: 95%   Weight: (!) 308 lb 2 oz (139.8 kg)    Body mass index is 45.5 kg/m .    General appearance: alert, appears stated age and cooperative  MSK: there is tenderness with palpation at the third, fourth and fifth MTP joints.  Mild soft tissue swelling present.  No erythema, warmth.  There is discomfort with flexion at these joints.     Results: X-ray of the  left foot was performed in clinic.  This was personally reviewed.  Per my read it showed presence of calcaneal spur.  No other abnormalities.    This note has been dictated using voice recognition software. Any grammatical or context distortions are unintentional and inherent to the the software.

## 2021-06-10 NOTE — PROGRESS NOTES
MTM Follow up Encounter  Assessment & Plan                                                     1. Type 2 diabetes mellitus   Most recent A1c of 5.8%, now meeting goal of <7% per ADA guidelines, after addition of oral GLP-1 agonist, Rybelsus and lifestyle modification.  Unfortunately copay card for Rybelsus will  soon. Options include changing to injectable weekly GLP-1 agonist or monitoring off medication since A1c is so well controlled. Cost of Ozempic is $250/month, reduced to $100/month with coupon. He needs to evaluate finances to determine if this is feasible, so for now, will try off Rybelsus when coupon expires and monitor blood sugars. Review BG goals and recommend recheck of A1c in ~2 months.     2. Essential hypertension  Blood pressure is well controlled and meeting goal of <140/90 mm Hg per JNC-8 hypertension guidelines.     3. Hyperlipidemia  Appropriately on a moderate intensity statin given age and diabetes diagnosis per ACC/AHA guidelines. Last LDL of 93 mg/dl.     4. Anxiety and depression  Improvement in symptoms with increase in sertraline dose. Insomnia also better controlled with use of hydroxyzine.     Follow Up  Return in about 2 months (around 2020).    Subjective & Objective                                                     Jose Morillo III is a 40 y.o. male called for a follow up visit for Medication Therapy Management. He was referred to me from Leesa Noland NP    Patient consented to a telehealth visit: Yes  Chief Complaint: diabetes follow up  Medication Adherence/Access: Good; no issues identified.     1. Type 2 diabetes mellitus   Misael is taking metformin  mg once a day and Rybelsus 7 mg daily.  At a previous visit, we decreased dose of metformin due to GI side effects and added oral GLP-1 agonist, Rybelsus.  He says he is no longer having upset stomach or diarrhea with lower dose of metformin.  Denies any nausea or side effects with Rybelsus.  He verifies  taking the medication as directed on an empty stomach with no more than 4 ounces of water in the morning.  He admits he has not been checking blood sugars much lately. Has not noticed much change in his appetite, but is losing weight. Works at [employer]. Does exercise regularly at home.     Lab Results   Component Value Date    HGBA1C 5.8 05/28/2020    HGBA1C 10.1 (H) 01/06/2020    HGBA1C 8.7 (H) 08/27/2019     Lab Results   Component Value Date    MICROALBUR <0.50 01/06/2020    LDLCALC 93 08/27/2019    CREATININE 0.91 05/28/2020       2. Essential hypertension  Misael is taking lisinopril 5 mg daily. He understands this is for blood pressure. No cough or other issues.     3. Hyperlipidemia  Misael is taking atorvastatin 20 mg every evening. He understands this is for cholesterol treatment. No adverse effects noted.     The 10-year ASCVD risk score (Cedrick MANZANARES Jr., et al., 2013) is: 2.9%    Values used to calculate the score:      Age: 40 years      Sex: Male      Is Non- : No      Diabetic: Yes      Tobacco smoker: No      Systolic Blood Pressure: 124 mmHg      Is BP treated: Yes      HDL Cholesterol: 37 mg/dL      Total Cholesterol: 167 mg/dL    4. Anxiety and depression  Misael is taking sertraline 150 mg daily and we had increased his dose at our last visit. His anxiety is better. Says it's under better control overall. Due to trouble sleeping, PCP also added hydroxyzine PRN. He takes 50 mg so he doesn't feel groggy the next day. He is getting better sleep with this. His trouble sleeping is especially on weeknights, worrying about the next day. He also takes lorazepam 0.5 mg as needed for anxiety, but takes this rarely, such as before flying.     PHQ-9 Total Score: 0 (3/9/2020  7:00 AM)    GILDARDO 7 Total Score: 13 (6/1/2020  7:00 AM)      PMH: reviewed in EPIC   Allergies/ADRs: reviewed in EPIC   Alcohol: no   Tobacco:   Social History     Tobacco Use   Smoking Status Never Smoker   Smokeless  Tobacco Never Used     Vitals:   BP Readings from Last 3 Encounters:   07/03/20 124/72   03/09/20 125/84   02/05/20 118/71     Pulse Readings from Last 3 Encounters:   07/03/20 90   03/09/20 75   02/05/20 73     Wt Readings from Last 3 Encounters:   07/03/20 (!) 308 lb 2 oz (139.8 kg)   03/09/20 (!) 301 lb (136.5 kg)   02/05/20 (!) 307 lb (139.3 kg)     ----------------    The patient was given a summary of these recommendations via [employer]    I spent 15 minutes with this patient today.   All changes were made via collaborative practice agreement with Leesa Noland NP. A copy of the visit note was provided to the patient's provider.     Stephy Oglesby, PharmD, BCACP  Medication Management (MTM) Pharmacist  [facility]        Current Outpatient Medications   Medication Sig Dispense Refill     aspirin 81 MG EC tablet Take 81 mg by mouth daily.       atorvastatin (LIPITOR) 20 MG tablet TAKE 1 TABLET BY MOUTH EVERY DAY AT BEDTIME FOR CHOLESTEROL 90 tablet 3     blood glucose test strips Use 1 each As Directed 2 (two) times a day as needed. Dispense brand per patient's insurance at pharmacy discretion. 100 strip 2     generic lancets (FINGERSTIX LANCETS) Use 1 each As Directed 2 (two) times a day as needed. Dispense brand per patient's insurance at pharmacy discretion. 100 each 2     hydrOXYzine pamoate (VISTARIL) 50 MG capsule Take 1-2 capsules ( mg total) by mouth at bedtime as needed for anxiety. 180 capsule 2     lisinopriL (PRINIVIL,ZESTRIL) 5 MG tablet Take 1 tablet (5 mg total) by mouth daily. 90 tablet 3     LORazepam (ATIVAN) 0.5 MG tablet Take 1 tablet by mouth 30 minutes prior to flying. May repeat the dose x1. 10 tablet 0     metFORMIN (GLUMETZA) 500 MG (MOD) 24 hr tablet Take 500 mg by mouth every evening.       semaglutide (RYBELSUS) 7 mg tablet Take 7 mg by mouth daily before breakfast. 30 tablet 11     sertraline (ZOLOFT) 100 MG tablet Take 1.5 tablets (150 mg  total) by mouth daily. 145 tablet 3     No current facility-administered medications for this visit.        Telemedicine Visit Details    Type of service:  Telephone     Start Time: 7:30 AM  End Time: 7:45 AM    Originating Location (pt. Location): Home    Distant Location (provider location):  [facility] MEDICATION THERAPY MANAGEMENT [facility]    Mode of Communication: Telephone

## 2021-06-11 NOTE — PROGRESS NOTES
Clinic Note    Assessment:     Assessment and Plan:    1. Anxiety and depression  He feels strongly that the Zoloft is not working for him anymore and wants to switch medications.  We will try Effexor.  In the meantime, we had a discussion about whether cannabis may be a good option for his anxiety.  He was using recreational cannabis in the past for his anxiety and it worked quite well.  We will set him up with an in person consultation with my  colleague, Dr. Dixon to discuss if he would be a good candidate for certification program.  Referral to counseling/therapy is in today.  Follow-up in 2 weeks for recheck of GILDARDO 7 and PHQ 9, which are quite elevated today.    2. Insomnia, unspecified type  Vistaril is not working for him.  We will trial trazodone.       Patient Instructions   Appointment with Dr. Dixon to discuss whether medical cannabis would be a good option for you.    Stop sertraline.  Start Effexor (venlafaxine).    Start with 1 tablet of the Effexor daily for 7 days.  After 7 days, increase to 2 tablets for a total dose of 150 mg daily.  You may have some headache/upset stomach symptoms while making the transition from sertraline to Effexor; this will usually get better after 1 week or so.    Stop the Vistaril.    Start trazodone 50 mg at night to help with sleep.  If after a few days, you notice this is insufficient, increase to 100 mg nightly.    Referral to psychology placed today to start counseling for your anxiety.  Somebody will be contacting you within the next couple of days to set that up.    Follow-up with me in 2 weeks to recheck anxiety symptoms.    Return in about 2 weeks (around 9/14/2020).         Subjective:      Jose Morillo III is a 40 y.o. male who comes the clinic today for uncontrolled anxiety symptoms.    He reports taking 200 mg of Zoloft daily for his anxiety.  Despite this, he feels like he is struggling at work, and having difficulty sleeping due to his uncontrolled  anxiety symptoms.  He does have a history of depression as well, but feels like his biggest issue is with anxiety.  Denies any suicidal or homicidal ideation today.    He has a prescription for lorazepam but he uses this for flying.  He has not taken any lorazepam for his anxiety symptoms lately.    He has a prescription for Vistaril that he uses for sleep.  He does not feel like the Vistaril is doing much in the way of helping him sleep.    He is unsure of his willingness to try counseling/therapy for his anxiety.    The following portions of the patient's history were reviewed and updated as appropriate: Allergies, medications,     Review of Systems:    Review is otherwise negative except for what is mentioned above.     Social Hx:    Social History     Tobacco Use   Smoking Status Never Smoker   Smokeless Tobacco Never Used         Objective:     Vitals:    08/31/20 1629   BP: 110/76   Pulse: 88   Weight: (!) 313 lb (142 kg)       Exam:    General: No apparent distress.  Mildly anxious appearing. Alert and Oriented X3. Pt behavior is appropriate.        Patient Active Problem List   Diagnosis     Anxiety and depression     Hyperlipemia     Hypertension     Morbid obesity (H)     Diabetes mellitus, type 2 (H)     Current Outpatient Medications   Medication Sig Dispense Refill     aspirin 81 MG EC tablet Take 81 mg by mouth daily.       atorvastatin (LIPITOR) 20 MG tablet TAKE 1 TABLET BY MOUTH EVERY DAY AT BEDTIME FOR CHOLESTEROL 90 tablet 3     blood glucose test strips Use 1 each As Directed 2 (two) times a day as needed. Dispense brand per patient's insurance at pharmacy discretion. 100 strip 2     generic lancets (FINGERSTIX LANCETS) Use 1 each As Directed 2 (two) times a day as needed. Dispense brand per patient's insurance at pharmacy discretion. 100 each 2     lisinopriL (PRINIVIL,ZESTRIL) 5 MG tablet Take 1 tablet (5 mg total) by mouth daily. 90 tablet 3     LORazepam (ATIVAN) 0.5 MG tablet Take 1 tablet  by mouth 30 minutes prior to flying. May repeat the dose x1. 10 tablet 0     metFORMIN (GLUMETZA) 500 MG (MOD) 24 hr tablet Take 500 mg by mouth every evening.       semaglutide (RYBELSUS) 7 mg tablet Take 7 mg by mouth daily before breakfast. 30 tablet 11     traZODone (DESYREL) 50 MG tablet Take 1 tablet (50 mg total) by mouth at bedtime. 30 tablet 0     venlafaxine (EFFEXOR-XR) 75 MG 24 hr capsule Take 1 capsule (75 mg total) by mouth daily. 60 capsule 0     No current facility-administered medications for this visit.            Nehemias Washington (Rob), CNP    8/31/2020

## 2021-06-11 NOTE — PROGRESS NOTES
ASSESSMENT:  1. Anxiety and depression  Patient with a longstanding history of anxiety, by his symptoms I think that this is consistent with PTSD.  - venlafaxine (EFFEXOR-XR) 75 MG 24 hr capsule; Take 1 capsule (75 mg total) by mouth daily. Take two pills daily for mood  Dispense: 60 capsule; Refill: 0    2. Insomnia, unspecified type  Presumably this is at least in part secondary to underlying anxiety.  - traZODone (DESYREL) 50 MG tablet; Take two to three nightly as needed for sleep  Dispense: 30 tablet; Refill: 0        PLAN:  1.  Increase venlafaxine from 75 mg to 100 mg daily  2.  Explained to the patient that he can take 2 to 3 tablets of trazodone nightly as needed.  3.  I registered the patient for medical cannabis.  4.  Has a follow-up in the next several weeks.    No orders of the defined types were placed in this encounter.    Medications Discontinued During This Encounter   Medication Reason     venlafaxine (EFFEXOR-XR) 75 MG 24 hr capsule      traZODone (DESYREL) 50 MG tablet Reorder       No follow-ups on file.    CHIEF COMPLAINT:  Chief Complaint   Patient presents with     Medication Refill     sleeping med is not working,        SUBJECTIVE:  Jose is a 40 y.o. male who comes in for follow-up anxiety.  The patient has what sounds like a 9-year history of anxiety that began after an episode of food poisoning.  At times the patient's symptoms have been quite disabling, he was on sertraline previously but he did not think that was all that helpful, he was just started on venlafaxine 75 mg daily he is only been on that about a week or so but has not really noticed any significant change though I told him it is probably too early.  Additionally the patient was started on trazodone 50 mg nightly because he is having trouble sleeping presumably because of anxiety but that has not made much of a difference either.    The patient has used recreational marijuana in the past which he did find helpful in  reducing his anxiety symptoms.  He is interested in medical marijuana.  Reviewed with the patient that PTSD would be an indication and given his symptoms duration and so forth I think he would qualify for that as a diagnosis.    He is gone through counseling in the past so he did not think that was all that helpful.      REVIEW OF SYSTEMS:      All other systems are negative.    PFSH:  Immunization History   Administered Date(s) Administered     INFLUENZA,SEASONAL QUAD, PF, =/> 6months 10/19/2017     Influenza [id number], 11/05/2009, 01/28/2010     Influenza, Seasonal, Inj PF IIV3 10/30/2007, 10/23/2008, 11/05/2009, 01/28/2010     Influenza, inj, historic,unspecified 11/20/2014, 09/19/2016, 10/02/2017     Influenza,seasonal, Inj IIV3 02/15/2007, 11/04/2010, 10/19/2012, 10/07/2013     Influenza,seasonal,quad inj =/> 6months 09/21/2019     Pneumo Conj 13-V (2010&after) 01/04/2018     Td, adult adsorbed, PF 08/27/2019     Tdap 09/26/2008     Social History     Socioeconomic History     Marital status:      Spouse name: Not on file     Number of children: 3     Years of education: Not on file     Highest education level: Not on file   Occupational History     Occupation: Warehouse   Social Needs     Financial resource strain: Not on file     Food insecurity     Worry: Not on file     Inability: Not on file     Transportation needs     Medical: Not on file     Non-medical: Not on file   Tobacco Use     Smoking status: Never Smoker     Smokeless tobacco: Never Used   Substance and Sexual Activity     Alcohol use: No     Drug use: No     Sexual activity: Yes     Partners: Female     Birth control/protection: None   Lifestyle     Physical activity     Days per week: Not on file     Minutes per session: Not on file     Stress: Not on file   Relationships     Social connections     Talks on phone: Not on file     Gets together: Not on file     Attends Synagogue service: Not on file     Active member of club or  organization: Not on file     Attends meetings of clubs or organizations: Not on file     Relationship status: Not on file     Intimate partner violence     Fear of current or ex partner: Not on file     Emotionally abused: Not on file     Physically abused: Not on file     Forced sexual activity: Not on file   Other Topics Concern     Not on file   Social History Narrative     Not on file     No past medical history on file.  Family History   Problem Relation Age of Onset     Hyperlipidemia Mother      Diabetes Father      Diabetes type I Brother        MEDICATIONS:  Current Outpatient Medications   Medication Sig Dispense Refill     aspirin 81 MG EC tablet Take 81 mg by mouth daily.       atorvastatin (LIPITOR) 20 MG tablet TAKE 1 TABLET BY MOUTH EVERY DAY AT BEDTIME FOR CHOLESTEROL 90 tablet 3     blood glucose test strips Use 1 each As Directed 2 (two) times a day as needed. Dispense brand per patient's insurance at pharmacy discretion. 100 strip 2     generic lancets (FINGERSTIX LANCETS) Use 1 each As Directed 2 (two) times a day as needed. Dispense brand per patient's insurance at pharmacy discretion. 100 each 2     lisinopriL (PRINIVIL,ZESTRIL) 5 MG tablet Take 1 tablet (5 mg total) by mouth daily. 90 tablet 3     LORazepam (ATIVAN) 0.5 MG tablet Take 1 tablet by mouth 30 minutes prior to flying. May repeat the dose x1. 10 tablet 0     metFORMIN (GLUMETZA) 500 MG (MOD) 24 hr tablet Take 500 mg by mouth every evening.       semaglutide (RYBELSUS) 7 mg tablet Take 7 mg by mouth daily before breakfast. 30 tablet 11     traZODone (DESYREL) 50 MG tablet Take two to three nightly as needed for sleep 30 tablet 0     venlafaxine (EFFEXOR-XR) 75 MG 24 hr capsule Take 1 capsule (75 mg total) by mouth daily. Take two pills daily for mood 60 capsule 0     No current facility-administered medications for this visit.        TOBACCO USE:  Social History     Tobacco Use   Smoking Status Never Smoker   Smokeless Tobacco Never  Used       VITALS:  Vitals:    09/08/20 1557   BP: 110/64   Pulse: 74   Weight: (!) 316 lb (143.3 kg)     Wt Readings from Last 3 Encounters:   09/08/20 (!) 316 lb (143.3 kg)   08/31/20 (!) 313 lb (142 kg)   07/03/20 (!) 308 lb 2 oz (139.8 kg)       PHYSICAL EXAM:  Constitutional:   Reveals a male who appears his stated age.  Vitals: per nursing notes.  HEENT:  Ears:  External canals, TMs clear.    Eyes:  EOMs full, PERRL.  Musculoskeletal: No peripheral swelling.  Neuro:  Alert and oriented. Cranial nerves, motor, sensory exams are intact.  No gross focal deficits.  Psychiatric:  Memory intact, mood appropriate.    QUALITY MEASURES:      DATA REVIEWED:

## 2021-06-11 NOTE — TELEPHONE ENCOUNTER
"Pt Rx's sent on 9/18/20 are not at pt Pharmacy, Rx's mode \"print\",  Rx's Trazadone & Venlafaxine faxed to Pharmacy.  Rona Whittington RN, MA  [facility]    Triage Nurse Advisor    "

## 2021-06-11 NOTE — PATIENT INSTRUCTIONS - HE
Appointment with Dr. Dixon to discuss whether medical cannabis would be a good option for you.    Stop sertraline.  Start Effexor (venlafaxine).    Start with 1 tablet of the Effexor daily for 7 days.  After 7 days, increase to 2 tablets for a total dose of 150 mg daily.  You may have some headache/upset stomach symptoms while making the transition from sertraline to Effexor; this will usually get better after 1 week or so.    Stop the Vistaril.    Start trazodone 50 mg at night to help with sleep.  If after a few days, you notice this is insufficient, increase to 100 mg nightly.    Referral to psychology placed today to start counseling for your anxiety.  Somebody will be contacting you within the next couple of days to set that up.    Follow-up with me in 2 weeks to recheck anxiety symptoms.

## 2021-06-11 NOTE — TELEPHONE ENCOUNTER
Clinic appointment today in [place] - trazodone and venlafaxine - patient states pharmacy doesn't have prescriptions.     Called pharmacy - advised pharmacist was at dinner break and to call back at 1830.   1841 - called and spoke with pharmacist - confirmed that they didn't receive prescriptions. Gave verbal orders over the phone -     venlafaxine (EFFEXOR-XR) 75 MG 24 hr capsule  60 capsule  0  9/18/2020   No    Sig - Route: Take 2 capsules (150 mg total) by mouth daily. Take two pills daily for mood - Oral    Class: No Print       Disp  Refills  Start  End  VIC    traZODone (DESYREL) 50 MG tablet  90 tablet  0  9/18/2020   No    Sig - Route: Take 3 tablets (150 mg total) by mouth at bedtime. Take two to three nightly as needed for sleep - Oral    Class: No Print      1848 - called patient back and informed that medications have been called in.     Clementina Steven RN, Triage Nurse Advisor

## 2021-06-11 NOTE — TELEPHONE ENCOUNTER
"  Additional Information    Negative: Drug overdose and nurse unable to answer question    Negative: Caller requesting information not related to medicine    Negative: Caller requesting a prescription for Strep throat and has a positive culture result    Negative: Rash while taking a medication or within 3 days of stopping it    Negative: Immunization reaction suspected    Negative: [1] Asthma and [2] having symptoms of asthma (cough, wheezing, etc)    Negative: MORE THAN A DOUBLE DOSE of a prescription or over-the-counter (OTC) drug    Negative: [1] DOUBLE DOSE (an extra dose or lesser amount) of over-the-counter (OTC) drug AND [2] any symptoms (e.g., dizziness, nausea, pain, sleepiness)    Negative: [1] DOUBLE DOSE (an extra dose or lesser amount) of prescription drug AND [2] any symptoms (e.g., dizziness, nausea, pain, sleepiness)    Negative: Took another person's prescription drug    Negative: [1] DOUBLE DOSE (an extra dose or lesser amount) of prescription drug AND [2] NO symptoms (Exception: a double dose of antibiotics)    Negative: Diabetes drug error or overdose (e.g., insulin or extra dose)    Negative: [1] Request for URGENT new prescription or refill of \"essential\" medication (i.e., likelihood of harm to patient if not taken) AND [2] triager unable to fill per unit policy    Negative: [1] Prescription not at pharmacy AND [2] was prescribed today by PCP    Negative: Pharmacy calling with prescription questions and triager unable to answer question    Negative: Caller has URGENT medication question about med that PCP prescribed and triager unable to answer question    Negative: Caller has NON-URGENT medication question about med that PCP prescribed and triager unable to answer question    Negative: Caller requesting a NON-URGENT new prescription or refill and triager unable to refill per unit policy    Negative: Caller has medication question about med not prescribed by PCP and triager unable to answer " question (e.g., compatibility with other med, storage)    Negative: [1] DOUBLE DOSE (an extra dose or lesser amount) of over-the-counter (OTC) drug AND [2] NO symptoms    Negative: [1] DOUBLE DOSE (an extra dose or lesser amount) of antibiotic drug AND [2] NO symptoms    Negative: Caller has medication question only, adult not sick, and triager answers question    Negative: Caller has medication question, adult has minor symptoms, caller declines triage, and triager answers question    Negative: Caller requesting information about medication during pregnancy; adult is not ill and triager answers question    Negative: Caller requesting information about medication use with breastfeeding; neither adult nor infant is ill, and triager answers question    Caller requesting a refill, no triage required, and triager able to refill per unit policy    Protocols used: MEDICATION QUESTION CALL-A-

## 2021-06-11 NOTE — PROGRESS NOTES
Clinic Note    Assessment:     Assessment and Plan:  1. Anxiety and depression  He is doing better on Effexor after switching from Lexapro.  We have seen improvements in his GILDARDO 7 and PHQ 9.  Plan is to follow-up in 1 month to decide if we need to increase his Effexor further    2. Essential hypertension  Blood pressure is well controlled on lisinopril  - lisinopriL (PRINIVIL,ZESTRIL) 5 MG tablet; Take 1 tablet (5 mg total) by mouth daily.  Dispense: 90 tablet; Refill: 3    3. Insomnia, unspecified type  I told him that he could use 3 tablets (150 mg) of his trazodone in the evening to help him sleep.             Subjective:      Patient comes the clinic today for follow-up.    We have been seeing him over the last couple of weeks for his depression and anxiety symptoms.    He had been on Lexapro.  We decided to switch him to Effexor.  He recently saw  and was given a certification for medical cannabis.  Patient states that the medical cannabis could be a viable option but is too expensive right now to pursue.    He has been using the Effexor now for 2 weeks.  Using 150 mg daily.  He is using trazodone for sleep which has helped.  He feels like he needs to use 3 tablets of the trazodone (150 mg) in the evening to get a full night's rest.    His PHQ 9 has decreased from 24 to17.    His GILDARDO 7 has decreased from 20 to 13.    In general, he says that he has been feeling better recently.  His family members have mentioned to him that he seems to be less agitated.  He has not noticed any problematic side effects from the Effexor.    He continues to deny suicidal ideation today.  Work is going well.    The following portions of the patient's history were reviewed and updated as appropriate: Allergies, medications, problems, prior note.    Review of Systems:    Review is otherwise negative except for what is mentioned above.     Social Hx:    Social History     Tobacco Use   Smoking Status Never Smoker   Smokeless  Tobacco Never Used         Objective:     Vitals:    09/18/20 1634   BP: 132/76   Pulse: 96   SpO2: 97%   Weight: (!) 313 lb (142 kg)       Exam:    General: No apparent distress. Calm. Alert and Oriented X3. Pt behavior is appropriate.      Patient Active Problem List   Diagnosis     Anxiety and depression     Hyperlipemia     Hypertension     Morbid obesity (H)     Diabetes mellitus, type 2 (H)     Current Outpatient Medications   Medication Sig Dispense Refill     aspirin 81 MG EC tablet Take 81 mg by mouth daily.       atorvastatin (LIPITOR) 20 MG tablet TAKE 1 TABLET BY MOUTH EVERY DAY AT BEDTIME FOR CHOLESTEROL 90 tablet 3     blood glucose test strips Use 1 each As Directed 2 (two) times a day as needed. Dispense brand per patient's insurance at pharmacy discretion. 100 strip 2     generic lancets (FINGERSTIX LANCETS) Use 1 each As Directed 2 (two) times a day as needed. Dispense brand per patient's insurance at pharmacy discretion. 100 each 2     lisinopriL (PRINIVIL,ZESTRIL) 5 MG tablet Take 1 tablet (5 mg total) by mouth daily. 90 tablet 3     LORazepam (ATIVAN) 0.5 MG tablet Take 1 tablet by mouth 30 minutes prior to flying. May repeat the dose x1. 10 tablet 0     metFORMIN (GLUMETZA) 500 MG (MOD) 24 hr tablet Take 500 mg by mouth every evening.       semaglutide (RYBELSUS) 7 mg tablet Take 7 mg by mouth daily before breakfast. 30 tablet 11     traZODone (DESYREL) 50 MG tablet Take 3 tablets (150 mg total) by mouth at bedtime. Take two to three nightly as needed for sleep 90 tablet 0     venlafaxine (EFFEXOR-XR) 75 MG 24 hr capsule Take 2 capsules (150 mg total) by mouth daily. Take two pills daily for mood 60 capsule 0     No current facility-administered medications for this visit.            Nehemias Washington (Rob), DAMARIS    9/18/2020

## 2021-06-12 NOTE — TELEPHONE ENCOUNTER
I spoke with Misael. He states he is stable right now and doesn't need anything. He will call for an appt if things change or if he feels like his meds arent working anymore.

## 2021-06-12 NOTE — PROGRESS NOTES
"Jose Morillo III is a 40 y.o. male who is being evaluated via a billable video visit.      The patient has been notified of following:     \"This video visit will be conducted via a call between you and your physician/provider. We have found that certain health care needs can be provided without the need for an in-person physical exam.  This service lets us provide the care you need with a video conversation.  If a prescription is necessary we can send it directly to your pharmacy.  If lab work is needed we can place an order for that and you can then stop by our lab to have the test done at a later time.    Video visits are billed at different rates depending on your insurance coverage. Please reach out to your insurance provider with any questions.    If during the course of the call the physician/provider feels a video visit is not appropriate, you will not be charged for this service.\"    Patient has given verbal consent to a Video visit? Yes  How would you like to obtain your AVS? AVS Preference: MyChart.  If dropped by the video visit, the video invitation should be sent to: Text to cell phone: [phone]  Will anyone else be joining your video visit? No        Video Start Time: 8:30 AM    1. Sore throat  Discussed possible diagnosis and treatment. Recommend ruling out Strep.   - Rapid Strep A Screen- Throat Swab; Future    Subjective:  Jose Morillo, 40 years old male patient who presents via video visit with complaint of Sore throat that started 2 days ago and is getting worse. Patient reports that his son tested positive for Strep last week and he went in and got tested for COVID-19. He is still expecting his result. He reports that he did not get tested for strep during his last swab. Patient also reports elevated temperature of 100.7, body ache and nasal congestion. Patient denied cough, shortness of breath and chest pain.       Additional provider notes: GENERAL: Healthy, alert and no " distress  EYES: Eyes grossly normal to inspection. No discharge or erythema, or obvious scleral/conjunctival abnormalities.  RESP: No audible wheeze, cough, or visible cyanosis.  No visible retractions or increased work of breathing.    NEURO: Cranial nerves grossly intact. Mentation and speech appropriate for age.  PSYCH: Mentation appears normal, affect normal/bright, judgement and insight intact, normal speech and appearance well-groomed      Video-Visit Details    Type of service:  Video Visit    Video End Time (time video stopped): 8:48 AM  Originating Location (pt. Location): Home    Distant Location (provider location):  [facility]     Platform used for Video Visit: MEKHI Casanova

## 2021-06-13 NOTE — TELEPHONE ENCOUNTER
RN cannot approve Refill Request    RN can NOT refill this medication Protocol failed and NO refill given. Last office visit: 9/18/2020 Nehemias Washington CNP Last Physical: Visit date not found Last MTM visit: Visit date not found Last visit same specialty: 9/18/2020 Nehemias Washington CNP.  Next visit within 3 mo: Visit date not found  Next physical within 3 mo: Visit date not found      Noni Barlow, [facility] Triage/Med Refill 11/19/2020    Requested Prescriptions   Pending Prescriptions Disp Refills     venlafaxine (EFFEXOR-XR) 75 MG 24 hr capsule [Pharmacy Med Name: VENLAFAXINE ER 75MG CAPSULES] 180 capsule 3     Sig: TAKE 2 CAPSULE BY MOUTH EVERY DAY       Venlafaxine/Desvenlafaxine Refill Protocol Failed - 11/18/2020 12:32 AM        Failed - Fasting lipid cascade in last year     Cholesterol   Date Value Ref Range Status   08/27/2019 167 <=199 mg/dL Final     Triglycerides   Date Value Ref Range Status   08/27/2019 186 (H) <=149 mg/dL Final     HDL Cholesterol   Date Value Ref Range Status   08/27/2019 37 (L) >=40 mg/dL Final     LDL Calculated   Date Value Ref Range Status   08/27/2019 93 <=129 mg/dL Final     Patient Fasting > 8hrs?   Date Value Ref Range Status   08/27/2019 No  Final             Passed - LFT or AST or ALT in last year     Albumin   Date Value Ref Range Status   05/28/2020 3.6 3.5 - 5.0 g/dL Final     Bilirubin, Total   Date Value Ref Range Status   05/28/2020 0.6 0.0 - 1.0 mg/dL Final     Alkaline Phosphatase   Date Value Ref Range Status   05/28/2020 98 45 - 120 U/L Final     AST   Date Value Ref Range Status   05/28/2020 84 (H) 0 - 40 U/L Final     ALT   Date Value Ref Range Status   05/28/2020 72 (H) 0 - 45 U/L Final     Protein, Total   Date Value Ref Range Status   05/28/2020 7.0 6.0 - 8.0 g/dL Final                Passed - PCP or prescribing provider visit in last year     Last office visit with prescriber/PCP: 9/18/2020 Nehemias Washington CNP OR same dept: 9/18/2020 Felix  DAMARIS Del Cid OR same specialty: 9/18/2020 Nehemias Washington CNP  Last physical: Visit date not found Last MTM visit: Visit date not found   Next visit within 3 mo: Visit date not found  Next physical within 3 mo: Visit date not found  Prescriber OR PCP: Nehemias Washington CNP  Last diagnosis associated with med order: 1. Insomnia, unspecified type  - traZODone (DESYREL) 50 MG tablet; TAKE 3 TABLETS BY MOUTH EVERY NIGHT AT BEDTIME  Dispense: 270 tablet; Refill: 3    2. Anxiety and depression  - venlafaxine (EFFEXOR-XR) 75 MG 24 hr capsule [Pharmacy Med Name: VENLAFAXINE ER 75MG CAPSULES]; TAKE 2 CAPSULE BY MOUTH EVERY DAY  Dispense: 60 capsule; Refill: 0    If protocol passes may refill for 12 months if within 3 months of last provider visit (or a total of 15 months).             Passed - Blood Pressure in last year     BP Readings from Last 1 Encounters:   09/18/20 132/76              Signed Prescriptions Disp Refills    traZODone (DESYREL) 50 MG tablet 270 tablet 3     Sig: TAKE 3 TABLETS BY MOUTH EVERY NIGHT AT BEDTIME       Tricyclics/Misc Antidepressant/Antianxiety Meds Refill Protocol Passed - 11/18/2020 12:32 AM        Passed - PCP or prescribing provider visit in last year     Last office visit with prescriber/PCP: 9/18/2020 Nehemias Washington CNP OR same dept: 9/18/2020 Nehemias Washington CNP OR same specialty: 9/18/2020 Nehemias Washington CNP  Last physical: Visit date not found Last MTM visit: Visit date not found   Next visit within 3 mo: Visit date not found  Next physical within 3 mo: Visit date not found  Prescriber OR PCP: Nehemias Washington CNP  Last diagnosis associated with med order: 1. Insomnia, unspecified type  - traZODone (DESYREL) 50 MG tablet; TAKE 3 TABLETS BY MOUTH EVERY NIGHT AT BEDTIME  Dispense: 270 tablet; Refill: 3    2. Anxiety and depression  - venlafaxine (EFFEXOR-XR) 75 MG 24 hr capsule [Pharmacy Med Name: VENLAFAXINE ER 75MG CAPSULES]; TAKE 2 CAPSULE BY MOUTH EVERY DAY  Dispense: 60 capsule;  Refill: 0    If protocol passes may refill for 12 months if within 3 months of last provider visit (or a total of 15 months).

## 2021-06-13 NOTE — TELEPHONE ENCOUNTER
Refill Approved    Rx renewed per Medication Renewal Policy. Medication was last renewed on 9/19*20.    Noni Barlow, [facility] Connection Triage/Med Refill 11/19/2020     Requested Prescriptions   Pending Prescriptions Disp Refills     traZODone (DESYREL) 50 MG tablet [Pharmacy Med Name: TRAZODONE 50MG TABLETS] 90 tablet 0     Sig: TAKE 3 TABLETS BY MOUTH EVERY NIGHT AT BEDTIME       Tricyclics/Misc Antidepressant/Antianxiety Meds Refill Protocol Passed - 11/18/2020 12:32 AM        Passed - PCP or prescribing provider visit in last year     Last office visit with prescriber/PCP: 9/18/2020 Nehemias Washington CNP OR same dept: 9/18/2020 Nehemias Washington CNP OR same specialty: 9/18/2020 Nehemias Washington CNP  Last physical: Visit date not found Last MTM visit: Visit date not found   Next visit within 3 mo: Visit date not found  Next physical within 3 mo: Visit date not found  Prescriber OR PCP: Nehemias Washington CNP  Last diagnosis associated with med order: 1. Insomnia, unspecified type  - traZODone (DESYREL) 50 MG tablet [Pharmacy Med Name: TRAZODONE 50MG TABLETS]; TAKE 3 TABLETS BY MOUTH EVERY NIGHT AT BEDTIME  Dispense: 90 tablet; Refill: 0    2. Anxiety and depression  - venlafaxine (EFFEXOR-XR) 75 MG 24 hr capsule [Pharmacy Med Name: VENLAFAXINE ER 75MG CAPSULES]; TAKE 2 CAPSULE BY MOUTH EVERY DAY  Dispense: 60 capsule; Refill: 0    If protocol passes may refill for 12 months if within 3 months of last provider visit (or a total of 15 months).                venlafaxine (EFFEXOR-XR) 75 MG 24 hr capsule [Pharmacy Med Name: VENLAFAXINE ER 75MG CAPSULES] 60 capsule 0     Sig: TAKE 2 CAPSULE BY MOUTH EVERY DAY       Venlafaxine/Desvenlafaxine Refill Protocol Failed - 11/18/2020 12:32 AM        Failed - Fasting lipid cascade in last year     Cholesterol   Date Value Ref Range Status   08/27/2019 167 <=199 mg/dL Final     Triglycerides   Date Value Ref Range Status   08/27/2019 186 (H) <=149 mg/dL Final     HDL Cholesterol    Date Value Ref Range Status   08/27/2019 37 (L) >=40 mg/dL Final     LDL Calculated   Date Value Ref Range Status   08/27/2019 93 <=129 mg/dL Final     Patient Fasting > 8hrs?   Date Value Ref Range Status   08/27/2019 No  Final             Passed - LFT or AST or ALT in last year     Albumin   Date Value Ref Range Status   05/28/2020 3.6 3.5 - 5.0 g/dL Final     Bilirubin, Total   Date Value Ref Range Status   05/28/2020 0.6 0.0 - 1.0 mg/dL Final     Alkaline Phosphatase   Date Value Ref Range Status   05/28/2020 98 45 - 120 U/L Final     AST   Date Value Ref Range Status   05/28/2020 84 (H) 0 - 40 U/L Final     ALT   Date Value Ref Range Status   05/28/2020 72 (H) 0 - 45 U/L Final     Protein, Total   Date Value Ref Range Status   05/28/2020 7.0 6.0 - 8.0 g/dL Final                Passed - PCP or prescribing provider visit in last year     Last office visit with prescriber/PCP: 9/18/2020 Nehemias Washington CNP OR same dept: 9/18/2020 Nehemias Washington CNP OR same specialty: 9/18/2020 Nehemias Washington CNP  Last physical: Visit date not found Last MTM visit: Visit date not found   Next visit within 3 mo: Visit date not found  Next physical within 3 mo: Visit date not found  Prescriber OR PCP: Nehemias Washington CNP  Last diagnosis associated with med order: 1. Insomnia, unspecified type  - traZODone (DESYREL) 50 MG tablet [Pharmacy Med Name: TRAZODONE 50MG TABLETS]; TAKE 3 TABLETS BY MOUTH EVERY NIGHT AT BEDTIME  Dispense: 90 tablet; Refill: 0    2. Anxiety and depression  - venlafaxine (EFFEXOR-XR) 75 MG 24 hr capsule [Pharmacy Med Name: VENLAFAXINE ER 75MG CAPSULES]; TAKE 2 CAPSULE BY MOUTH EVERY DAY  Dispense: 60 capsule; Refill: 0    If protocol passes may refill for 12 months if within 3 months of last provider visit (or a total of 15 months).             Passed - Blood Pressure in last year     BP Readings from Last 1 Encounters:   09/18/20 132/76

## 2021-06-14 NOTE — PROGRESS NOTES
Assessment/Plan:     1. Type 2 diabetes mellitus with hyperglycemia, without long-term current use of insulin (H)  Suspect poor control given high blood sugars.  Labs pending.  Will refer back to MTM to discuss an alternative medication to the Rybelsus, as this was too expensive. May need to consider Insulin if other options are not affordable.   - Glycosylated Hemoglobin A1c  - Microalbumin, Random Urine  - Comprehensive Metabolic Panel  -  DIABETES FOOT EXAM  - Lipid Bradley FASTING  - Ambulatory referral to Medication Management    2. Anxiety and depression  Suboptimal control.  Increase Effexor to 225 mg once daily.  Follow up in 3 months.  - venlafaxine (EFFEXOR-XR) 75 MG 24 hr capsule; Take 3 capsules (225 mg total) by mouth daily.  Dispense: 240 capsule; Refill: 3    3. Insomnia, unspecified type  Sleep continues to be disrupted.  May increase Trazodone to 200 mg.  May need to consider a sleep study in the future as his body habitus places him at risk for YOGESH.  - traZODone (DESYREL) 100 MG tablet; Take 2 tablets (200 mg total) by mouth at bedtime.  Dispense: 180 tablet; Refill: 3    4. Morbid obesity (H)        Subjective:     Jose Morillo III is a 40 y.o. male who presents for follow up and a diabetic check.    Patient currently on Metformin for his diabetes.  He stopped taking his Rybelsus, as it became too expensive.  He knows his diabetes is out of control, as his fasting blood sugars have been high and he is urinating frequently.  He is also very thirsty.  Fasting blood sugars have been up to 383.  His fasting blood sugar this morning was 287.  We have been unable to advance his Metformin dose due to side effects.  Denies any vision changes, open sores, or paresthesias.    Patient had COVID in November.  He does not seem to be having any lingering side effects.    Patient has been having high anxiety, mostly to do with his health.  He saw another provider this last year who switched his Lexapro to  Effexor.  This has been helpful, but he continues to feel anxious.  No having depressive symptoms.  Sleep has been poor at night even with 150 mg of Trazodone.       The following portions of the patient's history were reviewed and updated as appropriate: allergies, current medications.    Review of Systems  A comprehensive review of systems was performed and was otherwise negative    Objective:     /84   Pulse 80   Wt (!) 313 lb 4.8 oz (142.1 kg)   BMI 46.27 kg/m      General Appearance: Alert, cooperative, no distress, appears stated age  Eyes: PERRL, conjunctiva/corneas clear, EOM's intact  Lungs: Clear to auscultation bilaterally, respirations unlabored  Heart: Regular rate and rhythm, S1 and S2 normal, no murmur, rub, or gallop   Extremities: Extremities normal, atraumatic, no cyanosis or edema  Psychologic: appropriate affective, answers all of my questions appropriately. No hallucinations, delusion, or suicidal ideations.    PHQ-9 Total Score: 13 (1/15/2021  2:00 PM)    GILDARDO 7 Total Score: 15 (1/15/2021  2:00 PM)        Leesa Noland NP

## 2021-06-14 NOTE — PROGRESS NOTES
Medication Therapy Management (MTM) Encounter    Assessment:                                                      1. Type 2 diabetes mellitus with hyperglycemia, without long-term current use of insulin (H)  Not at goal A1C <7% per ADA guidelines, largely due to inability to afford Rybelsus which had previously been very well tolerated and effective. Reviewed benefits vs risks of resuming Rybelsus with hopes of getting updated coupon, vs injectable [id number] or sulfonylurea. Prefer to avoid medications which may cause weight gain, however will needs to balance cost going forward, will check coverage on rybelsus first. Due to previous tolerance, resume at higher dose, 7mg daily per patient preference, to ensure quicker blood sugar response.  Close follow up via [employer] regarding next steps. Stable on aspirin, statin, ACEI.     2. Anxiety and depression  Improved after dose increase in venlafaxine and trazodone. Tolerating medication adjustments without adverse effects, however venlafaxine may worsen sleep due to stimulating properties, therefore adjust to AM dosing and reassess at follow up.     3. Insomnia, unspecified type  Improvement after titration in trazodone, also identified that SNRI dosed in the evening, will trial dosing venlafaxine in AM to assess if this has positive impact on sleep and mood.     Plan:                                                     1. Educate to dose venlafaxine in AM   2. Resume rybelsus 7mg daily, educate to acquire new coupon     Follow Up  Return in about 1 week (around 1/29/2021) for Lissette Mcdonald, PharmD, BCACP, via [employer].    Subjective & Objective                                                     Jose Morillo III is a 40 y.o. male called for an initial visit for Medication Therapy Management. He was referred to me from Leesa Noland NP.     Reason for visit: Medication management initial    Medication Adherence/Access: Stable, adherent to all of his medications  "however he was having difficulties with cost with Rybelsus and as result has been off of this medication for about a month.    Type 2 diabetes:  During the coronavirus while he was off of Rybelsus and with worsening anxiety/depression he \"sort of gave up on a diabetes diet.\"  Notes that he started peeing more and knew that he needed to have his blood sugars checked.  Notes that his excessive urination is the worst overnight.  He is experiencing polyuria, polydipsia, and again has been eating less of the diabetes diet.  He is only able to tolerate 500 mg of Metformin daily due to worsening diarrhea on higher doses of Metformin in addition to stomach upset.  He previously was on Rybelsus, tolerating without any adverse effects and any dose per patient report, and that is how he was able to get his A1c down to 5.8% last May.  He would like to discuss alternative medications due to difficulties with cost.  Notes that most of these medications cost the same amount of money through his insurance, most of which are too expensive for him to continue on long-term.  The last few weeks he has been watching his diet, however his blood sugars have gone from 300s down to 250 fasting in the morning.  With this improvement in blood sugars he has not noticed any improvement in his hyperglycemia symptoms.  Lab Results   Component Value Date    HGBA1C 10.7 (H) 01/15/2021    HGBA1C 5.8 05/28/2020    HGBA1C 10.1 (H) 01/06/2020     Lab Results   Component Value Date    MICROALBUR 1.99 01/15/2021    LDLCALC 91 01/15/2021    CREATININE 1.06 01/15/2021     Anxiety and depression: Reviewed dose titration in venlafaxine to 225 mg daily, and notes that he takes all of his medications in the evening.  He has had ongoing difficulties with sleep and anxiety/depression.  He would be willing to try taking venlafaxine in the morning to see if this helps alleviate some of his insomnia and improve symptoms during the day.  Infrequently he may use CBD " for his anxiety as well.  He only uses lorazepam as needed prior to flying.  PHQ-9 Total Score: 13 (1/15/2021  2:00 PM)  GILDARDO 7 Total Score: 15 (1/15/2021  2:00 PM)    Insomnia: Notes improvement in sleep after increase at previous appointment in trazodone to 200 mg at bedtime.  Denies adverse effects such as somnolence the next morning.    PMH: reviewed in EPIC   Allergies/ADRs: reviewed in EPIC   Alcohol: None   Tobacco:   Social History     Tobacco Use   Smoking Status Never Smoker   Smokeless Tobacco Never Used     Today's Vitals:   BP Readings from Last 3 Encounters:   01/15/21 124/84   09/18/20 132/76   09/08/20 110/64   ----------------  The patient declined an after visit summary    I spent 21 minutes with this patient today.   All changes were made via collaborative practice agreement with Leesa Noland NP. A copy of the visit note was provided to the patient's provider.     Robb Mcdonald, PharmD, BCACP  Medication Management (MTM) Pharmacist  [facility]    Telemedicine Visit Details    Type of service:  Telephone     Start Time: 1:30PM  End Time: 1:51PM    Originating Location (pt. Location): Home    Distant Location (provider location):  [place] MEDICATION THERAPY MANAGEMENT [facility]    Mode of Communication: Telephone    Current Outpatient Medications   Medication Sig Dispense Refill     aspirin 81 MG EC tablet Take 81 mg by mouth daily.       atorvastatin (LIPITOR) 20 MG tablet TAKE 1 TABLET BY MOUTH EVERY DAY AT BEDTIME FOR CHOLESTEROL 90 tablet 3     blood glucose test strips Use 1 each As Directed 2 (two) times a day as needed. Dispense brand per patient's insurance at pharmacy discretion. 100 strip 2     cannabidiol, CBD, (CANNABIDIOL ORAL) Take by mouth as needed.       generic lancets (FINGERSTIX LANCETS) Use 1 each As Directed 2 (two) times a day as needed. Dispense brand per patient's insurance at pharmacy discretion. 100 each 2     ibuprofen (ADVIL,MOTRIN) 200 MG  tablet Take 800 mg by mouth as needed for headaches.       lisinopriL (PRINIVIL,ZESTRIL) 5 MG tablet Take 1 tablet (5 mg total) by mouth daily. 90 tablet 3     LORazepam (ATIVAN) 0.5 MG tablet Take 1 tablet by mouth 30 minutes prior to flying. May repeat the dose x1. 10 tablet 0     metFORMIN (GLUMETZA) 500 MG (MOD) 24 hr tablet Take 500 mg by mouth every evening.       traZODone (DESYREL) 100 MG tablet Take 2 tablets (200 mg total) by mouth at bedtime. 180 tablet 3     venlafaxine (EFFEXOR-XR) 75 MG 24 hr capsule Take 3 capsules (225 mg total) by mouth daily. 240 capsule 3     semaglutide (RYBELSUS) 7 mg tablet Take 7 mg by mouth daily. 30 tablet 0     No current facility-administered medications for this visit.         Medication Therapy Recommendations  Anxiety and depression    Current Medication: venlafaxine (EFFEXOR-XR) 75 MG 24 hr capsule   Rationale: Undesirable effect - Adverse medication event - Safety   Recommendation: Provide Education - educate to dose venlafaxine in AM   Status: Patient Agreed - Adherence/Education         Type 2 diabetes mellitus with hyperglycemia, without long-term current use of insulin (H)    Rationale: Preventive therapy - Needs additional medication therapy - Indication   Recommendation: Start Medication - initiate rybelsus 7mg daily   Status: Accepted per CPA

## 2021-06-14 NOTE — TELEPHONE ENCOUNTER
Received MTM referral from clinic     Patient was not reachable after several attempts, will route to MTM Pharmacist/Provider as an FYI. Left MTM scheduling information on patients voicemail.    Thank you for the referral,    Duncan Gill, MTM coordinator

## 2021-06-15 NOTE — TELEPHONE ENCOUNTER
RN cannot approve Refill Request    RN can NOT refill this medication Dose different thatn current dose on med list. Last office visit: 1/15/2021 Leesa Noland NP Last Physical: Visit date not found Last MTM visit: Visit date not found Last visit same specialty: 1/15/2021 Leesa Noland NP.  Next visit within 3 mo: Visit date not found  Next physical within 3 mo: Visit date not found      Rona Whittington, Care Connection Triage/Med Refill 2/27/2021    Requested Prescriptions   Pending Prescriptions Disp Refills     atorvastatin (LIPITOR) 20 MG tablet [Pharmacy Med Name: ATORVASTATIN 20MG TABLETS] 90 tablet 3     Sig: TAKE 1 TABLET BY MOUTH EVERY DAY AT BEDTIME FOR CHOLESTEROL       Statins Refill Protocol (Hmg CoA Reductase Inhibitors) Passed - 2/26/2021  9:12 PM        Passed - PCP or prescribing provider visit in past 12 months      Last office visit with prescriber/PCP: 1/15/2021 Leesa Noland NP OR same dept: 1/15/2021 Leesa Noland NP OR same specialty: 1/15/2021 Leesa Noland NP  Last physical: Visit date not found Last MTM visit: Visit date not found   Next visit within 3 mo: Visit date not found  Next physical within 3 mo: Visit date not found  Prescriber OR PCP: Leesa Noland NP  Last diagnosis associated with med order: 1. Hyperlipidemia, unspecified hyperlipidemia type  - atorvastatin (LIPITOR) 20 MG tablet [Pharmacy Med Name: ATORVASTATIN 20MG TABLETS]; TAKE 1 TABLET BY MOUTH EVERY DAY AT BEDTIME FOR CHOLESTEROL  Dispense: 90 tablet; Refill: 3    2. Type 2 diabetes mellitus with hyperglycemia, without long-term current use of insulin (H)  - metFORMIN (GLUCOPHAGE-XR) 500 MG 24 hr tablet [Pharmacy Med Name: METFORMIN ER 500MG 24HR TABS]; TAKE 2 TABLETS(1000 MG) BY MOUTH TWICE DAILY WITH MEALS  Dispense: 360 tablet; Refill: 1    If protocol passes may refill for 12 months if within 3 months of last provider visit (or a total of 15 months).                metFORMIN  (GLUCOPHAGE-XR) 500 MG 24 hr tablet [Pharmacy Med Name: METFORMIN ER 500MG 24HR TABS] 360 tablet 1     Sig: TAKE 2 TABLETS(1000 MG) BY MOUTH TWICE DAILY WITH MEALS       Metformin Refill Protocol Passed - 2/26/2021  9:12 PM        Passed - Blood pressure in last 12 months     BP Readings from Last 1 Encounters:   01/15/21 124/84             Passed - LFT or AST or ALT in last 12 months     Albumin   Date Value Ref Range Status   01/15/2021 4.1 3.5 - 5.0 g/dL Final     Bilirubin, Total   Date Value Ref Range Status   01/15/2021 0.8 0.0 - 1.0 mg/dL Final     Alkaline Phosphatase   Date Value Ref Range Status   01/15/2021 122 (H) 45 - 120 U/L Final     AST   Date Value Ref Range Status   01/15/2021 112 (H) 0 - 40 U/L Final     ALT   Date Value Ref Range Status   01/15/2021 198 (H) 0 - 45 U/L Final     Protein, Total   Date Value Ref Range Status   01/15/2021 7.6 6.0 - 8.0 g/dL Final                Passed - GFR or Serum Creatinine in last 6 months     GFR MDRD Non Af Amer   Date Value Ref Range Status   01/15/2021 >60 >60 mL/min/1.73m2 Final     GFR MDRD Af Amer   Date Value Ref Range Status   01/15/2021 >60 >60 mL/min/1.73m2 Final             Passed - Visit with PCP or prescribing provider visit in last 6 months or next 3 months     Last office visit with prescriber/PCP: 1/15/2021 OR same dept: 1/15/2021 Leesa Noland NP OR same specialty: 1/15/2021 Leesa Noland NP Last physical: Visit date not found Last MTM visit: Visit date not found         Next appt within 3 mo: Visit date not found  Next physical within 3 mo: Visit date not found  Prescriber OR PCP: Leesa Noland NP  Last diagnosis associated with med order: 1. Hyperlipidemia, unspecified hyperlipidemia type  - atorvastatin (LIPITOR) 20 MG tablet [Pharmacy Med Name: ATORVASTATIN 20MG TABLETS]; TAKE 1 TABLET BY MOUTH EVERY DAY AT BEDTIME FOR CHOLESTEROL  Dispense: 90 tablet; Refill: 3    2. Type 2 diabetes mellitus with hyperglycemia, without  long-term current use of insulin (H)  - metFORMIN (GLUCOPHAGE-XR) 500 MG 24 hr tablet [Pharmacy Med Name: METFORMIN ER 500MG 24HR TABS]; TAKE 2 TABLETS(1000 MG) BY MOUTH TWICE DAILY WITH MEALS  Dispense: 360 tablet; Refill: 1     If protocol passes may refill for 12 months if within 3 months of last provider visit (or a total of 15 months).           Passed - A1C in last 6 months     Hemoglobin A1c   Date Value Ref Range Status   01/15/2021 10.7 (H) <=5.6 % Final               Passed - Microalbumin in last year      Microalbumin, Random Urine   Date Value Ref Range Status   01/15/2021 1.99 0.00 - 1.99 mg/dL Final

## 2021-06-16 ENCOUNTER — COMMUNICATION - HEALTHEAST (OUTPATIENT)
Dept: FAMILY MEDICINE | Facility: CLINIC | Age: 41
End: 2021-06-16

## 2021-06-16 DIAGNOSIS — I10 ESSENTIAL HYPERTENSION: ICD-10-CM

## 2021-06-16 PROBLEM — E66.01 MORBID OBESITY (H): Status: ACTIVE | Noted: 2019-08-27

## 2021-06-16 PROBLEM — E78.5 HYPERLIPEMIA: Status: ACTIVE | Noted: 2018-07-11

## 2021-06-16 PROBLEM — E11.65 TYPE 2 DIABETES MELLITUS WITH HYPERGLYCEMIA, WITHOUT LONG-TERM CURRENT USE OF INSULIN (H): Status: ACTIVE | Noted: 2020-01-06

## 2021-06-16 PROBLEM — F41.9 ANXIETY AND DEPRESSION: Status: ACTIVE | Noted: 2018-07-11

## 2021-06-16 PROBLEM — F32.A ANXIETY AND DEPRESSION: Status: ACTIVE | Noted: 2018-07-11

## 2021-06-16 RX ORDER — LISINOPRIL 5 MG/1
5 TABLET ORAL DAILY
Qty: 90 TABLET | Refills: 3 | Status: SHIPPED | OUTPATIENT
Start: 2021-06-16 | End: 2022-07-01

## 2021-06-16 NOTE — PROGRESS NOTES
Assessment/Plan:     1. Type 2 diabetes mellitus with hyperglycemia, without long-term current use of insulin (H)  Discontinue Metformin, as patient does not seem to be tolerating this very well.  Continue Rybelsus.  Recheck Hgb A1c, but will likely have patient follow up with MTM to discuss if an injectable medication such as Victoza would be a good option.  This may help with compliance and control. Follow up in 3 months.   - Glycosylated Hemoglobin A1c    2. Anxiety and depression  Did not see much benefit from increased Effexor.  Discussed adding Wellbutrin, but he would like to wait for now.         Subjective:     Jose Morillo III is a 41 y.o. male who presents with complaints of nausea, dizziness, and headache.  He previously messaged me regarding these symptoms.  Patient restarted Metformin and Rybelsus about 3 months ago.  He has been feeling very low in energy, tired, and lacking motivation.  He initially thought maybe his Trazodone was causing these symptoms and stopped taking it, but did not feel much better.  He then stopped his Metformin, and his symptoms completely resolved.  He is now off the Metformin and feeling much better.  He is concerned about his diabetic control, and thinks his Hgb A1c will be worse.  He has some difficulty remembering to take his Rybelsus.  He is wondering if an injectable Diabetes medication would be a good option for him.  He has been working with an [facility] pharmacist.     Patient started a new job, which has been a good change for him.  Still having some anxiety and fatigue.  On maximum dose of Effexor.       The following portions of the patient's history were reviewed and updated as appropriate: allergies, current medications, past family history, past medical history, past social history, past surgical history and problem list.    Review of Systems  A comprehensive review of systems was performed and was otherwise negative    Objective:     /84   Pulse (!) 105    Wt (!) 298 lb (135.2 kg)   BMI 44.01 kg/m      General Appearance: Alert, cooperative, no distress, appears stated age  Lungs: Clear to auscultation bilaterally, respirations unlabored  Heart: Regular rate and rhythm, S1 and S2 normal, no murmur, rub, or gallop  Psychologic: appropriate affective, answers all of my questions appropriately. No hallucinations, delusion, or suicidal ideations.    Leesa Noland, NP

## 2021-06-17 NOTE — PROGRESS NOTES
Medication Therapy Management (MTM) Encounter    Assessment:                                                      1. Type 2 diabetes mellitus   Poorly controlled as evidenced by recent A1c of 12.2%, above goal of less than 7% per ADA guidelines.  Due to less than optimal adherence to Rybelsus, recommend changing to injectable version, weekly Ozempic. Medication education and counseling was provided, including indication, expected effect, dosing instructions, and possible side effects. The patient's questions were answered.  Unfortunately unable to tolerate even low-dose Metformin, and this added to patient's allergy list.  If additional medication is required, would next recommend SGLT2 inhibitor.  For now we will focus on optimizing Ozempic and lifestyle modification.  Discussed continuous glucose monitor which can also be helpful in lowering A1c given more data on daily glucose values.  Will send a prescription for FreeStyle Katalina and he will look into coverage.  Also provided patient with a coupon for a free 2-week trial.     2. Essential hypertension  Blood pressure is slightly above goal of <130/80 mm Hg per ACC/AHA hypertension guidelines at most recent check. If remains elevated at next visit, recommend increasing dose of lisinopril.      3. Hyperlipidemia  Appropriately on a moderate intensity statin given age and diabetes diagnosis per ACC/AHA guidelines.      4. Anxiety and depression  Not optimally controlled.  Initially changed from sertraline to venlafaxine was quite effective, but per patient report, effectiveness seems to be waning.  Discussed options of changing to alternative therapy or trying adjunctive medication, such as buspirone.  Opted to first start with trial of buspirone.  Medication education provided.    Plan:                                                       Stop Rybelsus.   Start Ozempic 0.5 mg subcutaneous weekly x4 weeks, then increase to 1 mg subcutaneous weekly if tolerated.      FreeStyle Katalina CGM.     Start buspirone 5 mg two times a day.     Follow Up  Return in about 1 month (around 6/10/2021).      Subjective & Objective                                                     Jose Morillo III is a 41 y.o. male called for a follow up visit for Medication Therapy Management. He was referred to me from Leesa Noland NP    Patient consented to a telehealth visit: Yes  Reason for visit: diabetes management  Medication Adherence/Access: Admits to some missed doses, especially Rybelsus.     1. Type 2 diabetes mellitus   Misael is taking Rybelsus 7 mg daily.  He could not tolerate high-dose metformin due to GI side effects, and even at low-dose he was having nausea, dizziness, and headache.  The symptoms resolved after he stopped the metformin.  He admits that sometimes he has difficulty remembering to take Rybelsus. He has noticed having to urinate often and knows blood sugars are high. He has lost 15 pounds. He walks about 5 miles a day at work. Sometimes does some weight lifting as well.   He admits he has not been checking blood sugars much lately. He is interested in a continuous monitor.     Lab Results   Component Value Date    HGBA1C 12.2 (H) 04/23/2021    HGBA1C 10.7 (H) 01/15/2021    HGBA1C 5.8 05/28/2020     Lab Results   Component Value Date    MICROALBUR 1.99 01/15/2021    LDLCALC 91 01/15/2021    CREATININE 1.06 01/15/2021     2. Essential hypertension  Misael is taking lisinopril 5 mg daily. He understands this is for blood pressure. No cough or other issues.      3. Hyperlipidemia  Misael is taking atorvastatin 20 mg every evening. He understands this is for cholesterol treatment. No adverse effects noted.       4. Anxiety and depression  Misael is taking venlafaxine  mg every morning and trazodone 200 mg at bedtime.  He was previously on sertraline, but anxiety was not well controlled so switched to venlafaxine last year with improvement. Says he can still have some  short temper and worries quite a bit. Feels like the pills maybe don't help as much as they initially did. Prior to trazodone he was on hydroxyzine, but finds trazodone more effective for insomnia. No grogginess the next day and sleeping through the night. He is working a new job which also improved his anxiety. He also takes lorazepam 0.5 mg as needed for anxiety, but takes this rarely, such as before flying.     PHQ-9 Total Score: 13 (1/15/2021  2:00 PM)      PMH: reviewed in EPIC   Allergies/ADRs: reviewed in EPIC   Alcohol: reviewed in EPIC   Tobacco:   Social History     Tobacco Use   Smoking Status Never Smoker   Smokeless Tobacco Never Used     Vitals:   BP Readings from Last 3 Encounters:   04/23/21 135/84   01/15/21 124/84   09/18/20 132/76     Pulse Readings from Last 3 Encounters:   04/23/21 (!) 105   01/15/21 80   09/18/20 96     Wt Readings from Last 3 Encounters:   04/23/21 (!) 298 lb (135.2 kg)   01/15/21 (!) 313 lb 4.8 oz (142.1 kg)   09/18/20 (!) 313 lb (142 kg)     ----------------    The patient was given a summary of these recommendations via [employer]    I spent 30 minutes with this patient today.   All changes were made via collaborative practice agreement with Leesa Noland NP. A copy of the visit note was provided to the patient's provider.     Stephy Oglesby, PharmD, BCACP  Medication Management (MTM) Pharmacist  [facility]       Telemedicine Visit Details    Type of service:  Telephone     Start Time: 3:00 PM  End Time: 3:30 PM    Originating Location (pt. Location): Home    Distant Location (provider location):  [facility]    Mode of Communication: Telephone    Current Outpatient Medications   Medication Sig Dispense Refill     aspirin 81 MG EC tablet Take 81 mg by mouth daily.       atorvastatin (LIPITOR) 20 MG tablet TAKE 1 TABLET BY MOUTH EVERY DAY AT BEDTIME FOR CHOLESTEROL 90 tablet 3     blood  glucose test strips Use 1 each As Directed 2 (two) times a day as needed. Dispense brand per patient's insurance at pharmacy discretion. 100 strip 2     busPIRone (BUSPAR) 5 MG tablet Take 1 tablet (5 mg total) by mouth 2 (two) times a day. 60 tablet 3     cannabidiol, CBD, (CANNABIDIOL ORAL) Take by mouth as needed.       flash glucose sensor (FREESTYLE SPEEDY 14 DAY SENSOR) Kit Use 1 application As Directed every 14 (fourteen) days. 2 kit 11     generic lancets (FINGERSTIX LANCETS) Use 1 each As Directed 2 (two) times a day as needed. Dispense brand per patient's insurance at pharmacy discretion. 100 each 2     lisinopriL (PRINIVIL,ZESTRIL) 5 MG tablet Take 1 tablet (5 mg total) by mouth daily. 90 tablet 3     LORazepam (ATIVAN) 0.5 MG tablet Take 1 tablet by mouth 30 minutes prior to flying. May repeat the dose x1. 10 tablet 0     semaglutide (OZEMPIC) 0.25 mg or 0.5 mg(2 mg/1.5 mL) PnIj Inject 0.5 mg under the skin once a week. 1.5 mL 3     traZODone (DESYREL) 100 MG tablet Take 2 tablets (200 mg total) by mouth at bedtime. 180 tablet 3     venlafaxine (EFFEXOR-XR) 75 MG 24 hr capsule Take 3 capsules (225 mg total) by mouth daily. 240 capsule 3     No current facility-administered medications for this visit.         Medication Therapy Recommendations  Anxiety and depression    Current Medication: busPIRone (BUSPAR) 5 MG tablet   Rationale: Synergistic therapy - Needs additional medication therapy - Indication   Recommendation: Start Medication - Start buspirone 5 mg BID.   Status: Accepted per CPA         Type 2 diabetes mellitus with hyperglycemia, without long-term current use of insulin (H)    Current Medication: semaglutide (OZEMPIC) 0.25 mg or 0.5 mg(2 mg/1.5 mL) PnIj   Rationale: Medication requires monitoring - Needs additional monitoring - Effectiveness   Recommendation: Self-Monitoring - FreeStyle Speedy CGM.   Status: Accepted per CPA          Current Medication: semaglutide (RYBELSUS) 7 mg tablet  (Discontinued)   Rationale: Patient forgets to take - Adherence - Adherence   Recommendation: Change Medication - Stop Rybelsus. Start Ozempic 0.5 mg SQ weekly.   Status: Accepted per CPA

## 2021-06-17 NOTE — PATIENT INSTRUCTIONS - HE
Recommendations from today's Medication Management (MTM) visit:                                                      Stop Rybelsus.   Start Ozempic 0.5 mg subcutaneous weekly x4 weeks, then increase to 1 mg subcutaneous weekly if tolerated.     FreeStyle Katalina CGM.     Start buspirone 5 mg two times a day.       To schedule another MTM appointment, please call the clinic directly or you may call   the MTM scheduling line at [phone] or toll-free at [phone].     My MTM pharmacist's contact information:                                                      It was a pleasure speaking with you today. Please feel free to contact me with any questions or concerns you have.      Stephy Oglesby, PharmD, [facility]  Medication Management (MTM) Pharmacist  [facility]     You may receive a survey about the MTM services you received by email, text, and/or US Mail.  I would appreciate your feedback to help me serve you better in the future. Your comments will be anonymous.

## 2021-06-19 NOTE — PROGRESS NOTES
"Assessment/Plan:     1. Encounter to establish care    2. Hypertension  Blood pressure well controlled with low dose Lisinopril.  May consider trialing patient off of this in the future if he continues to lose wegith.   - Comprehensive Metabolic Panel    3. Hyperlipemia  - Lipid Cascade FASTING    4. Anxiety and depression  Stable.  Continue Zoloft.   - sertraline (ZOLOFT) 50 MG tablet; Take 1 tablet (50 mg total) by mouth daily.  Dispense: 90 tablet; Refill: 3    5. Screening for diabetes mellitus  - Glycosylated Hemoglobin A1c        Subjective:     Jose Morillo III is a 38 y.o. male who presents to establish care.  Patient previously at [facility]; TOMAS completed and sent.  History of obesity, hypertension, and hyperlipidemia.  Patient previously diagnosed with Type II Diabetes, but never started medication for this.  Patient has lost ~75 pounds with diet and exercise.  He does crossfit or home workouts about 6 days per week.  Patient is  with 3 children.  He works in a warehouse.    History of anxiety and depression.  Diagnosed and started on depression about 2 years ago.  He has been stable on Zoloft.  He mainly gets anxiety with new places and social situations.  He gets a lot of GI symptoms with his anxiety.  Patient feels stable at this time.     History of hypertension.  Currently on Lisinopril, 5 mg once daily.  He is not checking blood pressures at home.  No CP, shortness of breath, LE edema, or dizziness.lightheadedness.      The following portions of the patient's history were reviewed and updated as appropriate: allergies, current medications, past family history, past medical history, past social history, past surgical history and problem list.    Review of Systems  A comprehensive review of systems was performed and was otherwise negative    Objective:     /64 (Patient Site: Right Arm, Patient Position: Sitting, Cuff Size: Adult Large)  Pulse 80  Temp 98.6  F (37  C) (Oral)   Ht 5' 9\" " (1.753 m)  Wt (!) 295 lb 12.8 oz (134.2 kg)  BMI 43.68 kg/m2    General Appearance: Alert, cooperative, no distress, appears stated age  Neck: Supple, symmetrical, trachea midline, no adenopathy  Lungs: Clear to auscultation bilaterally, respirations unlabored  Heart: Regular rate and rhythm, S1 and S2 normal, no murmur, rub, or gallop  Psychologic: appropriate affective, answers all of my questions appropriately. No hallucinations, delusion, or suicidal ideations.    PHQ-9 Total Score: 1 (7/10/2018  4:00 PM)  GILDARDO-7 Total Score: 6 (7/10/2018  4:00 PM)    ALEXIS Ramirez

## 2021-06-19 NOTE — LETTER
Letter by Leesa Noland NP at      Author: Leesa Noland NP Service: -- Author Type: --    Filed:  Encounter Date: 9/26/2019 Status: Signed         Jose Morillo III  [address]  [place] [facility] [address]             September 26, 2019         Dear Mr. Morillo,    Our records indicate that you are due for your annual diabetic eye exam.  If you have already completed this exam within this year please help us get a copy of the exam so we can update your records. They can be faxed to [phone]. If you have not completed this yet this year, please call your eye clinic and schedule an appointment.    Please call with questions or contact us using [employer].    Sincerely,        Electronically signed by Leesa Noland NP

## 2021-06-20 ENCOUNTER — HEALTH MAINTENANCE LETTER (OUTPATIENT)
Age: 41
End: 2021-06-20

## 2021-06-20 NOTE — LETTER
Letter by Leesa Noland NP at      Author: Leesa Noland NP Service: -- Author Type: --    Filed:  Encounter Date: 1/28/2020 Status: (Other)         January 28, 2020     Patient: Jose Morillo III   YOB: 1980   Date of Visit: 1/28/2020       To Whom It May Concern:    It is my medical opinion that Jose Morillo should be excused from work 1/28/2020.  He may return on 1/29/2020 without restriction.    If you have any questions or concerns, please don't hesitate to call.    Sincerely,        Electronically signed by Leesa Noland NP

## 2021-07-03 NOTE — ADDENDUM NOTE
Addendum Note by Leesa Noland NP at 1/6/2020 10:00 AM     Author: Leesa Noland NP Service: -- Author Type: Nurse Practitioner    Filed: 1/7/2020  8:24 AM Encounter Date: 1/6/2020 Status: Signed    : Leesa Noland NP (Nurse Practitioner)    Addended by: LEESA NOLAND on: 1/7/2020 08:24 AM        Modules accepted: Orders

## 2021-07-03 NOTE — ADDENDUM NOTE
Addendum Note by Leesa Noland NP at 1/10/2020  4:57 PM     Author: Leesa Noland NP Service: -- Author Type: Nurse Practitioner    Filed: 1/10/2020  4:57 PM Encounter Date: 12/9/2019 Status: Signed    : Leesa Noland NP (Nurse Practitioner)    Addended by: LEESA NOLAND on: 1/10/2020 04:57 PM        Modules accepted: Orders

## 2021-07-03 NOTE — ADDENDUM NOTE
Addendum Note by Leesa Noland NP at 12/10/2019  9:14 AM     Author: Leesa Noland NP Service: -- Author Type: Nurse Practitioner    Filed: 12/10/2019  9:14 AM Encounter Date: 12/9/2019 Status: Signed    : Leesa Noland NP (Nurse Practitioner)    Addended by: LEESA NOLAND on: 12/10/2019 09:14 AM        Modules accepted: Orders

## 2021-07-03 NOTE — ADDENDUM NOTE
Addendum Note by Vidhi Vick FNP at 11/10/2020  8:40 AM     Author: Vidhi Vick FNP Service: -- Author Type: Nurse Practitioner    Filed: 11/10/2020  1:14 PM Encounter Date: 11/10/2020 Status: Signed    : Vidhi Vick FNP (Nurse Practitioner)    Addended by: VIDHI VICK on: 11/10/2020 01:14 PM        Modules accepted: Orders

## 2021-07-23 ENCOUNTER — OFFICE VISIT (OUTPATIENT)
Dept: FAMILY MEDICINE | Facility: CLINIC | Age: 41
End: 2021-07-23
Payer: COMMERCIAL

## 2021-07-23 VITALS
BODY MASS INDEX: 44.83 KG/M2 | DIASTOLIC BLOOD PRESSURE: 80 MMHG | HEART RATE: 80 BPM | WEIGHT: 303.6 LBS | SYSTOLIC BLOOD PRESSURE: 114 MMHG

## 2021-07-23 DIAGNOSIS — M25.561 ACUTE PAIN OF RIGHT KNEE: ICD-10-CM

## 2021-07-23 DIAGNOSIS — F41.9 ANXIETY: ICD-10-CM

## 2021-07-23 DIAGNOSIS — E11.65 TYPE 2 DIABETES MELLITUS WITH HYPERGLYCEMIA, WITHOUT LONG-TERM CURRENT USE OF INSULIN (H): Primary | ICD-10-CM

## 2021-07-23 LAB
ALBUMIN SERPL-MCNC: 3.7 G/DL (ref 3.5–5)
ALP SERPL-CCNC: 121 U/L (ref 45–120)
ALT SERPL W P-5'-P-CCNC: 37 U/L (ref 0–45)
ANION GAP SERPL CALCULATED.3IONS-SCNC: 11 MMOL/L (ref 5–18)
AST SERPL W P-5'-P-CCNC: 25 U/L (ref 0–40)
BILIRUB SERPL-MCNC: 0.4 MG/DL (ref 0–1)
BUN SERPL-MCNC: 16 MG/DL (ref 8–22)
CALCIUM SERPL-MCNC: 9.6 MG/DL (ref 8.5–10.5)
CHLORIDE BLD-SCNC: 104 MMOL/L (ref 98–107)
CO2 SERPL-SCNC: 26 MMOL/L (ref 22–31)
CREAT SERPL-MCNC: 0.95 MG/DL (ref 0.7–1.3)
GFR SERPL CREATININE-BSD FRML MDRD: >90 ML/MIN/1.73M2
GLUCOSE BLD-MCNC: 176 MG/DL (ref 70–125)
HBA1C MFR BLD: 7.3 % (ref 0–5.6)
POTASSIUM BLD-SCNC: 4.2 MMOL/L (ref 3.5–5)
PROT SERPL-MCNC: 7.2 G/DL (ref 6–8)
SODIUM SERPL-SCNC: 141 MMOL/L (ref 136–145)

## 2021-07-23 PROCEDURE — 99214 OFFICE O/P EST MOD 30 MIN: CPT | Performed by: NURSE PRACTITIONER

## 2021-07-23 PROCEDURE — 80053 COMPREHEN METABOLIC PANEL: CPT | Performed by: NURSE PRACTITIONER

## 2021-07-23 PROCEDURE — 36415 COLL VENOUS BLD VENIPUNCTURE: CPT | Performed by: NURSE PRACTITIONER

## 2021-07-23 PROCEDURE — 83036 HEMOGLOBIN GLYCOSYLATED A1C: CPT | Performed by: NURSE PRACTITIONER

## 2021-07-23 RX ORDER — SEMAGLUTIDE 1.34 MG/ML
1 INJECTION, SOLUTION SUBCUTANEOUS
COMMUNITY
Start: 2021-05-17 | End: 2021-07-23

## 2021-07-23 RX ORDER — LANCETS
1 EACH MISCELLANEOUS
COMMUNITY
Start: 2020-01-10 | End: 2021-11-17

## 2021-07-27 ASSESSMENT — ANXIETY QUESTIONNAIRES
6. BECOMING EASILY ANNOYED OR IRRITABLE: NEARLY EVERY DAY
1. FEELING NERVOUS, ANXIOUS, OR ON EDGE: NEARLY EVERY DAY
7. FEELING AFRAID AS IF SOMETHING AWFUL MIGHT HAPPEN: SEVERAL DAYS
GAD7 TOTAL SCORE: 12
2. NOT BEING ABLE TO STOP OR CONTROL WORRYING: SEVERAL DAYS
5. BEING SO RESTLESS THAT IT IS HARD TO SIT STILL: SEVERAL DAYS
IF YOU CHECKED OFF ANY PROBLEMS ON THIS QUESTIONNAIRE, HOW DIFFICULT HAVE THESE PROBLEMS MADE IT FOR YOU TO DO YOUR WORK, TAKE CARE OF THINGS AT HOME, OR GET ALONG WITH OTHER PEOPLE: VERY DIFFICULT
3. WORRYING TOO MUCH ABOUT DIFFERENT THINGS: MORE THAN HALF THE DAYS

## 2021-07-27 ASSESSMENT — PATIENT HEALTH QUESTIONNAIRE - PHQ9
SUM OF ALL RESPONSES TO PHQ QUESTIONS 1-9: 11
5. POOR APPETITE OR OVEREATING: SEVERAL DAYS

## 2021-07-27 NOTE — PROGRESS NOTES
"    Assessment & Plan     Type 2 diabetes mellitus with hyperglycemia, without long-term current use of insulin (H)  Hgb A1c significantly better with Ozempic.  Patient tolerating well.  Continue at current dose.  CMP pending.   - Hemoglobin A1c  - Comprehensive metabolic panel (BMP + Alb, Alk Phos, ALT, AST, Total. Bili, TP)    Acute pain of right knee  I would recommend proceeding with an MRI if he is not improving.  I recommend a trial of PT.    - Physical Therapy Referral; Future    Anxiety  Suboptimal control, but patient does not wish to  at this time.  He will discontinue the Buspar, as this was not terribly helpful.         Return in about 6 months (around 1/23/2022) for Follow up.    Leesa Noland NP  [facility]    Alo Douglas is a 41 year old who presents for follow-up. History of uncontrolled diabetes. He did recently start on Ozempic, and is tolerating this well. He is due for recheck of his hemoglobin A1c today.    Unfortunately, he has gained some weight since starting the Ozempic due to a knee injury. He \"tweaked\" his right knee. He was seen at [facility] for this and given an injection, but this was not helpful at all. They also did an x-ray which was unremarkable. Patient was referred for an MRI, but has to pay out-of-pocket for this and it is too expensive. Patient gets pain and stiffness, especially when he is sitting for long periods. Getting up and moving around is actually helpful. Denies any weakness or instability.    History of anxiety. He is currently on Lexapro. [facility] added BuSpar, but patient did not find this terribly helpful. He previously used recreational marijuana to help with his anxiety and sleep, and this was quite helpful. He is no longer using this. He does not wish to change his medication at all today.    Review of Systems   Pertinent items in HPI      Objective    /80   Pulse 80   Wt 137.7 kg (303 lb 9.6 " oz)   BMI 44.83 kg/m    Body mass index is 44.83 kg/m .  Physical Exam   GENERAL: healthy, alert and no distress  MS: Right knee appears normal without deformity, swelling, or redness. No laxity noted. No joint line tenderness. Normal extension and flexion.   PSYCH: mentation appears normal, affect normal/bright

## 2021-07-28 ASSESSMENT — ANXIETY QUESTIONNAIRES: GAD7 TOTAL SCORE: 12

## 2021-08-11 ENCOUNTER — MYC MEDICAL ADVICE (OUTPATIENT)
Dept: FAMILY MEDICINE | Facility: CLINIC | Age: 41
End: 2021-08-11

## 2021-08-11 NOTE — TELEPHONE ENCOUNTER
I called and spoke with patient, unable to schedule patient within time frame for surgery 08/16/2021. Patient will call other clinics.

## 2021-08-13 ENCOUNTER — OFFICE VISIT (OUTPATIENT)
Dept: FAMILY MEDICINE | Facility: CLINIC | Age: 41
End: 2021-08-13
Payer: COMMERCIAL

## 2021-08-13 VITALS
SYSTOLIC BLOOD PRESSURE: 110 MMHG | BODY MASS INDEX: 44.48 KG/M2 | HEART RATE: 84 BPM | WEIGHT: 301.2 LBS | DIASTOLIC BLOOD PRESSURE: 64 MMHG

## 2021-08-13 DIAGNOSIS — Z01.818 PREOP GENERAL PHYSICAL EXAM: Primary | ICD-10-CM

## 2021-08-13 DIAGNOSIS — M25.561 ACUTE PAIN OF RIGHT KNEE: ICD-10-CM

## 2021-08-13 DIAGNOSIS — E11.65 TYPE 2 DIABETES MELLITUS WITH HYPERGLYCEMIA, WITHOUT LONG-TERM CURRENT USE OF INSULIN (H): ICD-10-CM

## 2021-08-13 PROCEDURE — 93005 ELECTROCARDIOGRAM TRACING: CPT | Performed by: NURSE PRACTITIONER

## 2021-08-13 PROCEDURE — 93010 ELECTROCARDIOGRAM REPORT: CPT | Performed by: GENERAL ACUTE CARE HOSPITAL

## 2021-08-13 PROCEDURE — 99214 OFFICE O/P EST MOD 30 MIN: CPT | Performed by: NURSE PRACTITIONER

## 2021-08-13 PROCEDURE — U0003 INFECTIOUS AGENT DETECTION BY NUCLEIC ACID (DNA OR RNA); SEVERE ACUTE RESPIRATORY SYNDROME CORONAVIRUS 2 (SARS-COV-2) (CORONAVIRUS DISEASE [COVID-19]), AMPLIFIED PROBE TECHNIQUE, MAKING USE OF HIGH THROUGHPUT TECHNOLOGIES AS DESCRIBED BY CMS-2020-01-R: HCPCS | Performed by: NURSE PRACTITIONER

## 2021-08-13 PROCEDURE — U0005 INFEC AGEN DETEC AMPLI PROBE: HCPCS | Performed by: NURSE PRACTITIONER

## 2021-08-13 NOTE — PATIENT INSTRUCTIONS
Preparing for Your Surgery  Getting started  A nurse will call you to review your health history and instructions. They will give you an arrival time based on your scheduled surgery time.  Please be ready to share the following:    Your doctor's clinic name and phone number    Your medical, surgical and anesthesia history    A list of allergies and sensitivities    A list of medicines, including herbal treatments and over-the-counter drugs    Whether the patient has a legal guardian (ask how to send us the papers in advance)  If you have a child who's having surgery, please ask for a copy of Preparing for Your Child's Surgery.    Preparing for surgery    Within 30 days of surgery: Have a pre-op exam (sometimes called an H&P, or History and Physical). This can be done at a clinic or pre-operative center.  ? If you're having a , you may not need this exam. Talk to your care team    At your pre-op exam, talk to your care team about all medicines you take. If you need to stop any medicines before surgery, ask when to start taking them again.  ? We do this for your safety. Many medicines can make you bleed too much during surgery. Some change how well surgery (anesthesia) drugs work.    Call your insurance company to let them know you're having surgery. (If you don't have insurance, call [phone].)    Call your clinic if there's any change in your health. This includes signs of a cold or flu (sore throat, runny nose, cough, rash, fever). It also includes a scrape or scratch near the surgery site.    If you have questions on the day of surgery, call your hospital or surgery center.  Eating and drinking guidelines  For your safety: Unless your surgeon tells you otherwise, follow the guidelines below.    Eat and drink as usual until 8 hours before surgery. After that, no food or milk.    Drink clear liquids until 2 hours before surgery. These are liquids you can see through, like water, Gatorade and Propel  Water. You may also have black coffee and tea (no cream or milk).    Nothing by mouth within 2 hours of surgery. This includes gum, candy and breath mints.    If you drink, stop drinking alcohol the night before surgery.    If your care team tells you to take medicine on the morning of surgery, it's okay to take it with a sip of water.  Preventing infection    Shower or bathe the night before and morning of your surgery. Follow the instructions your clinic gave you. (If no instructions, use regular soap.)    Don't shave or clip hair near your surgery site. We'll remove the hair if needed.    Don't smoke or vape the morning of surgery. You may chew nicotine gum up to 2 hours before surgery. A nicotine patch is okay.  ? Note: Some surgeries require you to completely quit smoking and nicotine. Check with your surgeon.    Your care team will make every effort to keep you safe from infection. We will:  ? Clean our hands often with soap and water (or an alcohol-based hand rub).  ? Clean the skin at your surgery site with a special soap that kills germs.  ? Give you a special gown to keep you warm. (Cold raises the risk of infection.)  ? Wear special hair covers, masks, gowns and gloves during surgery.  ? Give antibiotic medicine, if prescribed. Not all surgeries need antibiotics.  What to bring on the day of surgery    Photo ID and insurance card    Copy of your health care directive, if you have one    Glasses and hearing aides (bring cases)  ? You can't wear contacts during surgery    Inhaler and eye drops, if you use them (tell us about these when you arrive)    CPAP machine or breathing device, if you use them    A few personal items, if spending the night    If you have . . .  ? A pacemaker or ICD (cardiac defibrillator): Bring the ID card.  ? An implanted stimulator: Bring the remote control.  ? A legal guardian: Bring a copy of the certified (court-stamped) guardianship papers.  Please remove any jewelry, including  body piercings. Leave jewelry and other valuables at home.  If you're going home the day of surgery  Important: If you don't follow the rules below, we must cancel your surgery.     Arrange for someone to drive you home after surgery. You may not drive, take a taxi or take public transportation by yourself (unless you'll have local anesthesia only).    Arrange for a responsible adult to stay with you overnight. If you don't, we may keep you in the hospital overnight, and you may need to pay the costs yourself.  Questions?   If you have any questions for your care team, list them here: _________________________________________________________________________________________________________________________________________________________________________________________________________________________________________________________________________________________________________________________  For informational purposes only. Not to replace the advice of your health care provider. Copyright   2003, 2019 [employer]. All rights reserved. Clinically reviewed by Milana Ugalde MD. SMARTworks 315854 - REV 4/20.    Before Your Procedure or Hospital Admission  Testing for COVID-19 (Coronavirus)  Thank you for choosing [facility] for your health care needs. This is a very challenging time for everyone. The World Health Organization and the State [facility] have declared the COVID-19 virus a pandemic.   Our goal is to keep you and our team here at [facility] safe and healthy. We've taken several steps to make this happen. For example:    We screen our staff, care teams and patients for COVID-19.    Everyone at [facility] must wear a mask and stay 6 feet apart.    We are limiting hospital and clinic visitors.  Before you come in  All patients must get tested for COVID-19. Your test needs to happen 2 to 4 days before you check in to the hospital or surgery site.   A clinic scheduler will call  "about a week in advance to set up a testing time at one of our labs where we'll take a swab of your nose or throat.  Note: If you go to a clinic or pharmacy like [facility] or [facility] for your test, make sure it's a \"RT-PCR\" test, not a \"rapid\" COVID-19 test. (See Questions and Answers below.)  After the test, please stay at home and stay out of contact with other people. It will be harder for you to recover if you get COVID-19 before your treatment.  Please follow all current safety guidelines, including:    Limit trips outside your home.    Limit the number of people you see.    Always wear a mask outside your home.    Use social distancing. (Stay 6 feet away from others whenever you can.)    Wash your hands often.  If your test shows you have COVID-19  If your test is positive, we'll let you know. A positive test means that you have the virus.   We'll probably have to postpone your admission, surgery or procedure. Your doctor will discuss this with you. After that, we'll let you know what to do and when you can reschedule.   We may need to cancel your treatment on short notice for other reasons, too.  If your test shows you DON'T have COVID-19  Even if your test is negative, you may still get COVID-19. It's rare but, sometimes, the test result is wrong. You could also catch the virus after taking the test.   There's a very small chance that you could catch COVID-19 in the hospital or surgery center. [facility] has taken many steps to prevent this from happening.   Day of your surgery or procedure    Please come wearing a mask or something else that covers both your nose and mouth.    When you arrive, we'll ask you some questions to find out if you have any signs or symptoms of COVID-19.    Ask your care team if you can have visitors. All visitors must wear masks and will be screened for signs of COVID-19.  ? Even if no visitors are allowed, you can still have with you:    Your legal guardian or legal decision " "maker    A parent and one other visitor, if you are younger than 18 years old    A partner and a , if you are in labor  ? We might need to teach you about taking care of yourself after surgery. If so, a visitor can come into the hospital to learn about it, too.  ? The rules for visitors change often, depending on how much the virus is spreading. To learn more, see Visiting a Loved One in the Hospital during the COVID-19 Outbreak.  Please call your care team, hospital or surgery center if you have any questions. We thank you for your understanding and for choosing [facility] for your care.   Questions and Answers  Does it matter where I get tested for COVID-19?  Yes. We urge you to get tested at one of our [facility] COVID-19 testing sites. We process these tests in our lab and can get the results quickly. Your [facility] care team needs to get your results before you check in.  What should I do if I can't get tested at [facility]?  You can get tested somewhere else, but you'll need to take these extra steps:  1. Contact your family doctor or clinic to arrange your test.  2. Take the test within 4 days of your surgery or procedure. We can't accept tests older than 4 days.  3. Make sure your doctor or clinic faxes your results to [facility] at [phone].  If we don't get your results in time, we may have to postpone or cancel your treatment.  Ask if you're getting a \"RT-PCR\" COVID-19 test. It should NOT be a \"rapid\" COVID-19 test. Many drug stores use \"rapid\" tests, but they may not be as accurate. We don't accept the results of \"rapid\" tests.  For informational purposes only. Not to replace the advice of your health care provider. Copyright   2020 [facility] [employer]. All rights reserved. Clinically reviewed by Infection Prevention and the [facility] COVID-19 Clinical Team. PingMD 404963 - REV 10/20.    "

## 2021-08-13 NOTE — PROGRESS NOTES
[facility]  [address] [address]  Phone: [phone]  Fax: [phone]  Primary Provider: Leesa Noland  Pre-op Performing Provider: LEESA NOLAND    PREOPERATIVE EVALUATION:  Today's date: 8/13/2021    Jose Morillo III is a 41 year old male who presents for a preoperative evaluation.    Surgical Information:  Surgery/Procedure: Right knee arthroscopy  Surgery Location: [facility]  Surgeon:  Dr. Mejia  Surgery Date: 8/16/21  Time of Surgery: 745  Where patient plans to recover: At home with family  Fax number for surgical facility: [phone]    Type of Anesthesia Anticipated: General    Assessment & Plan     The proposed surgical procedure is considered INTERMEDIATE risk.    Preop general physical exam  EKG personally reviewed as NSR.  COVID testing pending.   - EKG 12-lead, tracing only  - Asymptomatic COVID-19 Virus (Coronavirus) by PCR Nasopharyngeal    Acute pain of right knee    Type 2 diabetes mellitus with hyperglycemia, without long-term current use of insulin (H)  Well controlled.        Risks and Recommendations:  The patient has the following additional risks and recommendations for perioperative complications:   - Morbid obesity (BMI >40)    Medication Instructions:  Patient to HOLD all his medications the morning of surgery.    RECOMMENDATION:  APPROVAL GIVEN to proceed with proposed procedure, without further diagnostic evaluation.      Subjective     HPI related to upcoming procedure:     Patient is scheduled for a right knee scope.  Developed right knee pain and swelling about 2 months ago.  He received a steroid injection, but that was not helpful.  He recently had an MRI.  Tells me his ligaments and menisci are intact, but has some underlying arthritis.  He has been having a lot of pain with walking.      Preop Questions 8/13/2021   1. Have you ever had a heart attack or stroke? No   2. Have you ever  had surgery on your heart or blood vessels, such as a stent placement, a coronary artery bypass, or surgery on an artery in your head, neck, heart, or legs? No   3. Do you have chest pain with activity? No   4. Do you have a history of  heart failure? No   5. Do you currently have a cold, bronchitis or symptoms of other infection? No   6. Do you have a cough, shortness of breath, or wheezing? No   7. Do you or anyone in your family have previous history of blood clots? No   8. Do you or does anyone in your family have a serious bleeding problem such as prolonged bleeding following surgeries or cuts? No   9. Have you ever had problems with anemia or been told to take iron pills? No   10. Have you had any abnormal blood loss such as black, tarry or bloody stools? No   11. Have you ever had a blood transfusion? No   12. Are you willing to have a blood transfusion if it is medically needed before, during, or after your surgery? Yes   13. Have you or any of your relatives ever had problems with anesthesia? No   14. Do you have sleep apnea, excessive snoring or daytime drowsiness? No   15. Do you have any artifical heart valves or other implanted medical devices like a pacemaker, defibrillator, or continuous glucose monitor? No   16. Do you have artificial joints? No   17. Are you allergic to latex? No       Health Care Directive:  Patient does not have a Health Care Directive or Living Will: Discussed advance care planning with patient; however, patient declined at this time.    Preoperative Review of :   reviewed - no record of controlled substances prescribed.      Review of Systems  CONSTITUTIONAL: NEGATIVE for fever, chills, change in weight  INTEGUMENTARY/SKIN: NEGATIVE for worrisome rashes, moles or lesions  EYES: NEGATIVE for vision changes or irritation  ENT/MOUTH: NEGATIVE for ear, mouth and throat problems  RESP: NEGATIVE for significant cough or SOB  CV: NEGATIVE for chest pain, palpitations or  peripheral edema  GI: NEGATIVE for nausea, abdominal pain, heartburn, or change in bowel habits  : NEGATIVE for frequency, dysuria, or hematuria  MUSCULOSKELETAL: NEGATIVE for significant arthralgias or myalgia  NEURO: NEGATIVE for weakness, dizziness or paresthesias  ENDOCRINE: NEGATIVE for temperature intolerance, skin/hair changes  HEME: NEGATIVE for bleeding problems  PSYCHIATRIC: NEGATIVE for changes in mood or affect    Patient Active Problem List    Diagnosis Date Noted     Type 2 diabetes mellitus with hyperglycemia, without long-term current use of insulin (H) 01/06/2020     Priority: Medium     Morbid obesity (H) 08/27/2019     Priority: Medium     Anxiety and depression 07/11/2018     Priority: Medium     Hyperlipemia 07/11/2018     Priority: Medium     Hypertension 07/11/2018     Priority: Medium      No past medical history on file.  Past Surgical History:   Procedure Laterality Date     OPEN REDUCTION INTERNAL FIXATION ANKLE Right      Current Outpatient Medications   Medication Sig Dispense Refill     aspirin 81 MG EC tablet [ASPIRIN 81 MG EC TABLET] Take 81 mg by mouth daily.       atorvastatin (LIPITOR) 20 MG tablet [ATORVASTATIN (LIPITOR) 20 MG TABLET] TAKE 1 TABLET BY MOUTH EVERY DAY AT BEDTIME FOR CHOLESTEROL 90 tablet 3     blood glucose test strips [BLOOD GLUCOSE TEST STRIPS] Use 1 each As Directed 2 (two) times a day as needed. Dispense brand per patient's insurance at pharmacy discretion. 100 strip 2     cannabidiol, CBD, (CANNABIDIOL ORAL) [CANNABIDIOL, CBD, (CANNABIDIOL ORAL)] Take by mouth as needed.       Fingerstix Lancets MISC 1 each       flash glucose sensor (FREESTYLE SPEEDY 14 DAY SENSOR) Kit [FLASH GLUCOSE SENSOR (FREESTYLE SPEEDY 14 DAY SENSOR) KIT] Use 1 application As Directed every 14 (fourteen) days. 2 kit 11     generic lancets (FINGERSTIX LANCETS) [GENERIC LANCETS (FINGERSTIX LANCETS)] Use 1 each As Directed 2 (two) times a day as needed. Dispense brand per patient's  insurance at pharmacy discretion. 100 each 2     lisinopriL (PRINIVIL,ZESTRIL) 5 MG tablet [LISINOPRIL (PRINIVIL,ZESTRIL) 5 MG TABLET] Take 1 tablet (5 mg total) by mouth daily. 90 tablet 3     LORazepam (ATIVAN) 0.5 MG tablet [LORAZEPAM (ATIVAN) 0.5 MG TABLET] Take 1 tablet by mouth 30 minutes prior to flying. May repeat the dose x1. 10 tablet 0     semaglutide (OZEMPIC) 1 mg/dose (4 mg/3 mL) PnIj [SEMAGLUTIDE (OZEMPIC) 1 MG/DOSE (4 MG/3 ML) PNIJ] Inject 1 mg under the skin every 7 days. 9 mL 3     traZODone (DESYREL) 100 MG tablet [TRAZODONE (DESYREL) 100 MG TABLET] Take 2 tablets (200 mg total) by mouth at bedtime. 180 tablet 3     venlafaxine (EFFEXOR-XR) 75 MG 24 hr capsule [VENLAFAXINE (EFFEXOR-XR) 75 MG 24 HR CAPSULE] Take 3 capsules (225 mg total) by mouth daily. 240 capsule 3       Allergies   Allergen Reactions     Metformin Nausea, Dizziness and Headache        Social History     Tobacco Use     Smoking status: Never Smoker     Smokeless tobacco: Never Used   Substance Use Topics     Alcohol use: No     Family History   Problem Relation Age of Onset     Hyperlipidemia Mother      Diabetes Father      Diabetes Type 1 Brother      History   Drug Use No         Objective     /64   Pulse 84   Wt 136.6 kg (301 lb 3.2 oz)   BMI 44.48 kg/m      Physical Exam    GENERAL APPEARANCE: healthy, alert and no distress     EYES: EOMI,  PERRL     HENT: ear canals and TM's normal and nose and mouth without ulcers or lesions     NECK: no adenopathy, no asymmetry, masses, or scars and thyroid normal to palpation     RESP: lungs clear to auscultation - no rales, rhonchi or wheezes     CV: regular rates and rhythm, normal S1 S2, no S3 or S4 and no murmur, click or rub     ABDOMEN:  soft, nontender, no HSM or masses and bowel sounds normal     MS: extremities normal- no gross deformities noted, no evidence of inflammation in joints, FROM in all extremities.     SKIN: no suspicious lesions or rashes     NEURO: Normal  strength and tone, sensory exam grossly normal, mentation intact and speech normal     PSYCH: mentation appears normal. and affect normal/bright     LYMPHATICS: No cervical adenopathy    Recent Labs   Lab Test 07/23/21  1553 04/23/21  1637 01/15/21  1359     --  139   POTASSIUM 4.2  --  4.5   CR 0.95  --  1.06   A1C 7.3* 12.2* 10.7*        Diagnostics:  Recent Results (from the past 720 hour(s))   Hemoglobin A1c    Collection Time: 07/23/21  3:53 PM   Result Value Ref Range    Hemoglobin A1C 7.3 (H) 0.0 - 5.6 %   Comprehensive metabolic panel (BMP + Alb, Alk Phos, ALT, AST, Total. Bili, TP)    Collection Time: 07/23/21  3:53 PM   Result Value Ref Range    Sodium 141 136 - 145 mmol/L    Potassium 4.2 3.5 - 5.0 mmol/L    Chloride 104 98 - 107 mmol/L    Carbon Dioxide (CO2) 26 22 - 31 mmol/L    Anion Gap 11 5 - 18 mmol/L    Urea Nitrogen 16 8 - 22 mg/dL    Creatinine 0.95 0.70 - 1.30 mg/dL    Calcium 9.6 8.5 - 10.5 mg/dL    Glucose 176 (H) 70 - 125 mg/dL    Alkaline Phosphatase 121 (H) 45 - 120 U/L    AST 25 0 - 40 U/L    ALT 37 0 - 45 U/L    Protein Total 7.2 6.0 - 8.0 g/dL    Albumin 3.7 3.5 - 5.0 g/dL    Bilirubin Total 0.4 0.0 - 1.0 mg/dL    GFR Estimate >90 >60 mL/min/1.73m2   EKG 12-lead, tracing only    Collection Time: 08/13/21  2:32 PM   Result Value Ref Range    Systolic Blood Pressure  mmHg    Diastolic Blood Pressure  mmHg    Ventricular Rate 93 BPM    Atrial Rate 93 BPM    MI Interval 124 ms    QRS Duration 94 ms     ms    QTc 427 ms    P Axis 0 degrees    R AXIS 40 degrees    T Axis 39 degrees    Interpretation ECG       Sinus rhythm  Normal ECG  When compared with ECG of 28-APR-2016 06:46,  No significant change was found        EKG required for morbid obesity, Type 2 Dibetes and not completed in the last 90 days.     Revised Cardiac Risk Index (RCRI):  The patient has the following serious cardiovascular risks for perioperative complications:   - No serious cardiac risks = 0 points     RCRI  Interpretation: 0 points: Class I (very low risk - 0.4% complication rate)           Signed Electronically by: Leesa Noland NP  Copy of this evaluation report is provided to requesting physician.

## 2021-08-14 LAB — SARS-COV-2 RNA RESP QL NAA+PROBE: NEGATIVE

## 2021-08-17 LAB
ATRIAL RATE - MUSE: 93 BPM
DIASTOLIC BLOOD PRESSURE - MUSE: NORMAL MMHG
INTERPRETATION ECG - MUSE: NORMAL
P AXIS - MUSE: 0 DEGREES
PR INTERVAL - MUSE: 124 MS
QRS DURATION - MUSE: 94 MS
QT - MUSE: 344 MS
QTC - MUSE: 427 MS
R AXIS - MUSE: 40 DEGREES
SYSTOLIC BLOOD PRESSURE - MUSE: NORMAL MMHG
T AXIS - MUSE: 39 DEGREES
VENTRICULAR RATE- MUSE: 93 BPM

## 2021-08-18 ENCOUNTER — TELEPHONE (OUTPATIENT)
Dept: FAMILY MEDICINE | Facility: CLINIC | Age: 41
End: 2021-08-18

## 2021-08-18 NOTE — TELEPHONE ENCOUNTER
Prior Authorization Approval    Authorization Effective Date: 7/19/2021  Authorization Expiration Date: 8/17/2024  Medication: Ozempic   Approved Dose/Quantity:   Reference #:     Insurance Company:    Expected CoPay:       CoPay Card Available:      Foundation Assistance Needed:    Which Pharmacy is filling the prescription (Not needed for infusion/clinic administered): [id number] - [place], [place] - [address] AT [facility] [address] [facility]  Pharmacy Notified: Yes  Patient Notified: Yes           Detail Level: Simple Patient Specific Counseling (Will Not Stick From Patient To Patient): Discussed staying at Valtrex 500mg daily unless getting more frequent outbreaks, if so can increase dose to 1,000 mg. Detail Level: Detailed

## 2021-08-18 NOTE — TELEPHONE ENCOUNTER
Central Prior Authorization Team   Phone: [phone]    PA Initiation    Medication: Ozempic   Insurance Company:    Pharm[facility] the Rx[id number] [place], [place] - [address] AT [facility] [address] [place]  Filling Pharmacy Phone: [phone]  Filling Pharmacy Fax:    Start Date: 8/18/2021

## 2021-08-18 NOTE — TELEPHONE ENCOUNTER
Prior Authorization Request    Who's requesting: Patient  Pharmacy Name and Location: [facility] [place]  Medication Name: Ozempic 1 mg subcutaneous injection  Insurance Plan: [facility] out of state  Rationale:  A1c improved from 12.2% to 7.3% on Ozempic. Requesting to continue therapy.   Informed patient that prior authorizations can take up to 10 business days for response:  yes  Okay to leave a detailed message: yes      *Please forward response to Stephy Oglesby, PharmD*

## 2021-10-10 ENCOUNTER — HEALTH MAINTENANCE LETTER (OUTPATIENT)
Age: 41
End: 2021-10-10

## 2021-11-15 ENCOUNTER — VIRTUAL VISIT (OUTPATIENT)
Dept: PHARMACY | Facility: CLINIC | Age: 41
End: 2021-11-15
Payer: COMMERCIAL

## 2021-11-15 ENCOUNTER — TELEPHONE (OUTPATIENT)
Dept: FAMILY MEDICINE | Facility: CLINIC | Age: 41
End: 2021-11-15
Payer: COMMERCIAL

## 2021-11-15 DIAGNOSIS — I10 PRIMARY HYPERTENSION: ICD-10-CM

## 2021-11-15 DIAGNOSIS — F41.9 ANXIETY AND DEPRESSION: ICD-10-CM

## 2021-11-15 DIAGNOSIS — E11.65 TYPE 2 DIABETES MELLITUS WITH HYPERGLYCEMIA, WITHOUT LONG-TERM CURRENT USE OF INSULIN (H): Primary | ICD-10-CM

## 2021-11-15 DIAGNOSIS — E78.5 HYPERLIPIDEMIA, UNSPECIFIED HYPERLIPIDEMIA TYPE: ICD-10-CM

## 2021-11-15 DIAGNOSIS — F32.A ANXIETY AND DEPRESSION: ICD-10-CM

## 2021-11-15 PROCEDURE — 99606 MTMS BY PHARM EST 15 MIN: CPT | Performed by: PHARMACIST

## 2021-11-15 RX ORDER — VENLAFAXINE HYDROCHLORIDE 150 MG/1
300 TABLET, EXTENDED RELEASE ORAL DAILY
Qty: 180 TABLET | Refills: 3 | Status: SHIPPED | OUTPATIENT
Start: 2021-11-15 | End: 2022-02-11

## 2021-11-15 NOTE — TELEPHONE ENCOUNTER
TARIK PA REQUEST    Prior Authorization Retail Medication Request - Quantity Limit Exception     Medication/Dose: venlafaxine  mg - dose of 2 capsules (300 mg) per day  ICD code (if different than what is on RX):    Previously Tried and Failed:  sertraline, buspirone  Rationale: Anxiety and depression not well controlled. Increasing dose so will need to take 2 capsules daily (Quantity limit of 1/day per pharmacy)    Insurance Name:  [facility] out of state  Insurance ID:        Pharmacy Information (if different than what is on RX)  Name:  [facility] [place]  Phone:  [phone]        *Please forward response to Stephy Oglesby, PharmD*

## 2021-11-15 NOTE — TELEPHONE ENCOUNTER
Central Prior Authorization Team   Phone: [phone]    PA Initiation    Medication: JARDIANCE 10MG  Insurance Company: [employer] - Phone [phone] Fax [phone]  Pharmacy Filling the Rx: [facility] #[id number] [place] [address] AT [place] [address]  Filling Pharmacy Phone: [phone]  Filling Pharmacy Fax:    Start Date: 11/15/2021

## 2021-11-15 NOTE — PATIENT INSTRUCTIONS
Recommendations from today'sMedication Management (MTM) visit:                                                      Start Jardiance 10 mg every morning.     Increase venlafaxine ER to 300 mg daily.     To schedule another MTM appointment,please call the clinic directly or you may call   the MTM scheduling line at [phone] or toll-free at [phone].     My MTM pharmacist's contact information:      It was a pleasure speaking with you today. Please feel free to contact me with any questions or concerns you have.      Stephy Oglesby, PharmD, [facility]  Management (MTM) Pharmacist  [facility]     You may receive a survey about the MTM services you received by email, text, and/or US Mail.  I would appreciateyour feedback to help me serve you better in the future. Your comments will be anonymous.

## 2021-11-15 NOTE — TELEPHONE ENCOUNTER
Prior Authorization Approval    Authorization Effective Date: 10/16/2021  Authorization Expiration Date: 11/15/2022  Medication: JARDIANCE 10MG  Approved Dose/Quantity:   Reference #:     Insurance Company: [employer] - Phone [phone] Fax [phone]  Expected CoPay:       CoPay Card Available:      Foundation Assistance Needed:    Which Pharmacy is filling the prescription (Not needed for infusion/clinic administered): [facility] [address] [place], [place] [address]LOUISE HUGGINS AT [place] [id number]  Pharmacy Notified: Yes  Patient Notified: Yes

## 2021-11-15 NOTE — PROGRESS NOTES
Medication Therapy Management (MTM) Encounter    ASSESSMENT:                            1. Type 2 diabetes mellitus   Last A1c 7.3%, but due for recheck and recent glucose values via CGM have been very elevated, over 250 mg/dl majority of the time. On maximum dose Ozempic and unable to tolerate metformin. Discussed medication options, including SGLT-2 inhibitor or basal insulin. Opted to start Jardiance. Medication education and counseling was provided, including indication, expected effect, dosing instructions, and possible side effects. The patient's questions were answered. Also emphasized importance of dietary changes - following low carb diet and reducing sugar intake. Depression and anxiety also likely contributing to patient's poor control of diabetes. Adjusting antidepressant medication as detailed below.      2. Essential hypertension  Blood pressure is well controlled and meeting goal of <130/80 mm Hg per ACC/AHA hypertension guidelines.     3. Hyperlipidemia  Appropriately on a moderate intensity statin given age and diabetes diagnosis per ACC/AHA guidelines.      4. Anxiety and depression  Not optimally controlled. Situationally, family stressors are contributing to patient's poor mental health. Discussed options for medication adjustments to help better manage his anxiety and depression. Recommended increasing dose of venlafaxine to 300 mg daily. Insomnia well controlled on trazodone.     PLAN:                            Start Jardiance 10 mg every morning. Recheck BMP in 4 weeks.     Increase venlafaxine ER to 300 mg daily.     Follow-up: Return in about 1 month (around 12/15/2021).    SUBJECTIVE/OBJECTIVE:                          Jose Morillo is a 41 year old male called for a follow-up visit. He was referred to me from Leesa Noland CNP.  Today's visit is a follow-up MTM visit from 5/10/21.      Reason for visit: uncontrolled diabetes.    Allergies/ADRs: Reviewed in chart  Past Medical History:  Reviewed in chart  Tobacco: He reports that he has never smoked. He has never used smokeless tobacco.  Alcohol: Reviewed in chart    Medication Adherence/Access: History of medication nonadherence/missed doses at times.     1. Type 2 diabetes mellitus   Misael is taking Ozempic 1 mg subcutaneous weekly. This was a change from Rybelsus earlier this year as he was having trouble remembering to take the Rybelsus every day with the specific administration instructions. He could not tolerate high-dose metformin due to GI side effects, and even at low-dose he was having nausea, dizziness, and headache.  The symptoms resolved after he stopped the metformin. This past summer after starting Ozempic, his A1c drastically improved, but now blood sugars have been high again. He uses Free[employer] Katalina CGM for monitoring. Blood sugars have been worse since his knee scope surgery in August. He has gained weight. Unable to be as active as he was previously as he continues to heal. He admits he just gave up and also isn't eating very well. Notices he is urinating frequently, likely because blood sugars are high.     Hemoglobin A1C   Date Value Ref Range Status   07/23/2021 7.3 (H) 0.0 - 5.6 % Final     Comment:     Normal <5.7%   Prediabetes 5.7-6.4%    Diabetes 6.5% or higher     Note: Adopted from ADA consensus guidelines.   04/23/2021 12.2 (H) <=5.6 % Final   01/15/2021 10.7 (H) <=5.6 % Final      Microalbumin Urine mg/dL   Date Value Ref Range Status   01/15/2021 1.99 0.00 - 1.99 mg/dL Final      No results found for: UCRR  LDL Cholesterol Calculated   Date Value Ref Range Status   01/15/2021 91 <=129 mg/dL Final     Creatinine   Date Value Ref Range Status   07/23/2021 0.95 0.70 - 1.30 mg/dL Final            2. Essential hypertension  Misael is taking lisinopril 5 mg daily. He understands this is for blood pressure. No cough or other issues.      3. Hyperlipidemia  Misael is taking atorvastatin 20 mg every evening. He understands this  is for cholesterol treatment. No adverse effects noted.       4. Anxiety and depression  Misael is taking venlafaxine  mg every morning and trazodone 200 mg at bedtime. At our last visit in May, we added buspirone to help with anxiety, but he didn't find it very effective, so PCP stopped the medication in July. He was previously on sertraline, but anxiety was not well controlled so switched to venlafaxine last year with improvement. He has been down lately and more anxious due to family stressors. He's also upset that his blood sugars are too high. He also takes lorazepam 0.5 mg as needed for anxiety, but takes this rarely, such as before flying.   Prior to trazodone he was on hydroxyzine, but finds trazodone more effective for insomnia. No grogginess the next day and sleeping through the night.       PHQ 9/18/2020 1/15/2021 7/27/2021   PHQ-9 Total Score 17 13 11   Q9: Thoughts of better off dead/self-harm past 2 weeks Not at all Not at all Not at all     GILDARDO-7 SCORE 9/18/2020 1/15/2021 7/27/2021   Total Score 13 15 12        Vitals:  BP Readings from Last 3 Encounters:   08/13/21 110/64   07/23/21 114/80   04/23/21 135/84      Pulse Readings from Last 3 Encounters:   08/13/21 84   07/23/21 80   04/23/21 105     Wt Readings from Last 3 Encounters:   08/13/21 301 lb 3.2 oz (136.6 kg)   07/23/21 303 lb 9.6 oz (137.7 kg)   04/23/21 298 lb (135.2 kg)      ----------------    I spent 15 minutes with this patient today. All changes were made via collaborative practice agreement with Leesa Noland NP. A copy of the visit note was provided to the patient's primary care provider.    The patient was sent via [employer] a summary of these recommendations.     Stephy Oglesby, PharmD, BCACP  Medication Management (MTM) Pharmacist  [facility]     Telemedicine Visit Details  Type of service:  Telephone visit  Start Time: 1:00 PM  End Time: 1:15 PM  Originating Location (patient location):  Home  Distant Location (provider location):  [facility]     Medication Therapy Recommendations  Anxiety and depression    Current Medication: venlafaxine (EFFEXOR-ER) 150 MG 24 hr tablet   Rationale: Dose too low - Dosage too low - Effectiveness   Recommendation: Increase Dose   Status: Accepted per CPA         Type 2 diabetes mellitus with hyperglycemia, without long-term current use of insulin (H)    Current Medication: empagliflozin (JARDIANCE) 10 MG TABS tablet   Rationale: Synergistic therapy - Needs additional medication therapy - Indication   Recommendation: Start Medication   Status: Accepted per CPA

## 2021-11-15 NOTE — TELEPHONE ENCOUNTER
TARIK PA REQUEST    Prior Authorization Retail Medication Request    Medication/Dose: Jardiance 10 mg  ICD code (if different than what is on RX):    Previously Tried and Failed: metformin, Ozempic  Rationale: Uncontrolled diabetes. Needs additional medication and prefer medication that will also provide additional benefits for CV risk reduction, weight loss, and renal protection.     Insurance Name:  [facility] out of [place]  Insurance ID:        Pharmacy Information (if different than what is on RX)  Name:  Dali Durán  Phone:  [phone]        *Please forward response to Stephy Oglesby, PharmD*

## 2021-11-15 NOTE — TELEPHONE ENCOUNTER
Prior Authorization Approval    Authorization Effective Date: 10/16/2021  Authorization Expiration Date: 11/14/2024  Medication: venlafaxine ER 150mg   Approved Dose/Quantity:   Reference #:     Insurance Company: [employer] - Phone [phone] Fax [phone]  Expected CoPay:       CoPay Card Available:      Foundation Assistance Needed:    Which Pharmacy is filling the prescription (Not needed for infusion/clinic administered): [facility] [address] [place] [address] KELI  AT [place] [address]  Pharmacy Notified: Yes  Patient Notified: Yes

## 2021-11-15 NOTE — TELEPHONE ENCOUNTER
Central Prior Authorization Team   Phone: [phone]    PA Initiation    Medication:   Insurance Company: [employer] - Phone [phone] Fax [phone]  [id number] [place], [place] - [facility] RACQUEL HUGGINS AT [place] [address]  Filling Pharmacy Phone: [phone]  Filling Pharmacy Fax:    Start Date: 11/15/2021

## 2021-11-17 ENCOUNTER — OFFICE VISIT (OUTPATIENT)
Dept: FAMILY MEDICINE | Facility: CLINIC | Age: 41
End: 2021-11-17
Payer: COMMERCIAL

## 2021-11-17 VITALS
DIASTOLIC BLOOD PRESSURE: 87 MMHG | HEART RATE: 107 BPM | SYSTOLIC BLOOD PRESSURE: 126 MMHG | OXYGEN SATURATION: 96 % | RESPIRATION RATE: 16 BRPM | TEMPERATURE: 98.6 F

## 2021-11-17 DIAGNOSIS — Z23 NEED FOR PROPHYLACTIC VACCINATION AND INOCULATION AGAINST INFLUENZA: ICD-10-CM

## 2021-11-17 DIAGNOSIS — S33.5XXA LUMBAR SPRAIN, INITIAL ENCOUNTER: Primary | ICD-10-CM

## 2021-11-17 PROCEDURE — 99213 OFFICE O/P EST LOW 20 MIN: CPT | Mod: 25 | Performed by: STUDENT IN AN ORGANIZED HEALTH CARE EDUCATION/TRAINING PROGRAM

## 2021-11-17 PROCEDURE — 90686 IIV4 VACC NO PRSV 0.5 ML IM: CPT | Performed by: STUDENT IN AN ORGANIZED HEALTH CARE EDUCATION/TRAINING PROGRAM

## 2021-11-17 PROCEDURE — 90471 IMMUNIZATION ADMIN: CPT | Performed by: STUDENT IN AN ORGANIZED HEALTH CARE EDUCATION/TRAINING PROGRAM

## 2021-11-17 RX ORDER — METHOCARBAMOL 500 MG/1
500 TABLET, FILM COATED ORAL 4 TIMES DAILY PRN
Qty: 60 TABLET | Refills: 0 | Status: SHIPPED | OUTPATIENT
Start: 2021-11-17 | End: 2021-12-28

## 2021-11-17 RX ORDER — CYCLOBENZAPRINE HYDROCHLORIDE 15 MG/1
15 CAPSULE, EXTENDED RELEASE ORAL DAILY
Qty: 30 CAPSULE | Refills: 0 | Status: SHIPPED | OUTPATIENT
Start: 2021-11-17 | End: 2021-11-17

## 2021-11-17 RX ORDER — MELOXICAM 7.5 MG/1
7.5 TABLET ORAL DAILY
Qty: 40 TABLET | Refills: 0 | Status: SHIPPED | OUTPATIENT
Start: 2021-11-17 | End: 2021-12-28

## 2021-11-17 NOTE — PATIENT INSTRUCTIONS
Patient Education     Back Sprain or Strain     Injury to the muscles (strain) or ligaments (sprain) around the spine can be troubling. Injury may occur after a sudden forceful twisting or bending such as in a car accident, after a simple awkward movement, or after lifting something heavy with poor body positioning. In any case, muscle spasm is often present and adds to the pain.  Thankfully, most people feel better in 1 to 2 weeks. Most of the rest feel better in 1 to 2 months. Most people can remain active. Unless you had a forceful or traumatic physical injury such as a car accident or fall, X-rays may not be done for the first assessment of a back sprain or strain. If pain continues and doesn't respond to medical treatment, your healthcare provider may then do X-rays and other tests.  Home care  These guidelines will help you care for your injury at home:    When in bed, try to find a comfortable position. A firm mattress is best. Try lying flat on your back with pillows under your knees. You can also try lying on your side with your knees bent up toward your chest and a pillow between your knees.    Don't sit for long periods. Try not to take long car rides or take other trips that have you sitting for a long time. This puts more stress on the lower back than standing or walking.    During the first 24 to 72 hours after an injury or flare-up, put an ice pack on the painful area for 20 minutes. Then remove it for 20 minutes. Do this for 60 to 90 minutes, or several times a day. This will reduce swelling and pain. Always wrap the ice pack in a thin towel or plastic to protect your skin.    You can start with ice, then switch to heat. Heat from a hot shower, hot bath, or heating pad reduces pain and works well for muscle spasms. Put heat on the painful area for 20 minutes, then remove for 20 minutes. Do this for 60 to 90 minutes, or several times a day. Don't use a heating pad while sleeping. It can burn the  skin.    You can alternate the ice and heat. Talk with your healthcare provider to find out the best treatment or therapy for your back pain.    Therapeutic massage can help relax the back muscles without stretching them.    Be aware of safe lifting methods. Don't lift anything over 15 pounds until all of the pain is gone.  Medicines  Talk with your healthcare provider before using medicines, especially if you have other health problems or are taking other medicines.    You may use over-the-counter medicines such as acetaminophen, ibuprofen, or naproxen to control pain, unless another pain medicine was prescribed. Talk with your healthcare provider before taking any medicines if you have a chronic condition such as diabetes, liver or kidney disease, stomach ulcers, or digestive bleeding, or are taking blood-thinner medicines.    Be careful if you are given prescription medicines, opioids, or medicine for muscle spasm. They can cause drowsiness, and affect your coordination, reflexes, and judgment. Don't drive or operate heavy machinery when taking these types of medicines. Only take pain medicine as prescribed by your healthcare provider.  Follow-up care  Follow up with your healthcare provider, or as advised. You may need physical therapy or more tests if your symptoms get worse.  If you had X-rays, your healthcare provider may be checking for any broken bones, breaks, or fractures. Bruises and sprains can sometimes hurt as much as a fracture. These injuries can take time to heal fully. If your symptoms don t get better or they get worse, talk with your healthcare provider. You may need a repeat X-ray or other tests.  Call 911  Call 911 if any of the following occur:    Trouble breathing    Confused    Very drowsy or trouble awakening    Fainting or loss of consciousness    Rapid or very slow heart rate    Loss of bowel or bladder control  When to seek medical advice  Call your healthcare provider right away if any  of the following occur:    Pain gets worse or spreads to your arms or legs    Weakness or numbness in one or both arms or legs    Numbness in the groin or genital area  Ashley last reviewed this educational content on 11/1/2019 2000-2021 [employer]. All rights reserved. This information is not intended as a substitute for professional medical care. Always follow your healthcare professional's instructions.

## 2021-11-17 NOTE — PROGRESS NOTES
Assessment & Plan     Lumbar sprain, initial encounter  History and physical consistent with lumbar back sprain due to physical activity at the gym, prescribed muscle relaxants and Mobic for acute inflammation. Does not have any red flag symptoms such as lower extremity numbness, tingling, weakness. Low concern for disc herniation at this time given presentation no indication for advanced imaging or orthopedic consult.  - meloxicam (MOBIC) 7.5 MG tablet; Take 1 tablet (7.5 mg) by mouth daily  - methocarbamol (ROBAXIN) 500 MG tablet; Take 1 tablet (500 mg) by mouth 4 times daily as needed for muscle spasms    Need for prophylactic vaccination and inoculation against influenza  - INFLUENZA VACCINE IM > 6 MONTHS VALENT IIV4 (AFLURIA/FLUZONE)    Return in about 2 weeks (around 12/1/2021), or if symptoms worsen or fail to improve.    Juan Ramon Vogt MD  [facility]    Alo Douglas is a 41 year old who presents for the following health issues    HPI   HPI:   Back pain:   Location: Lumbar Back that started 3 days ago after going to the gym with his son, denies any acute pain after his workout however the next day felt extreme soreness and pain in the lumbar back bilaterally, has had episodes of lumbar back sprain in the past that was responsive to muscle relaxants. Had also seen his chiropractor prior to visit today, minimal effectiveness.  Duration: 3 days  Radiation: No  Numbness/ Tingling? No   Weakness? No   Flexion or Extension bias: No   Red flags? No   PT? No   Imaging? No     Review of Systems   Constitutional, HEENT, cardiovascular, pulmonary, gi and gu systems are negative, except as otherwise noted.      Objective    /87   Pulse 107   Temp 98.6  F (37  C) (Oral)   Resp 16   SpO2 96%   There is no height or weight on file to calculate BMI.  Physical Exam   GENERAL: healthy, alert and no distress  EYES: Eyes grossly normal to inspection, PERRL and conjunctivae and sclerae  normal  MS: no gross musculoskeletal defects noted, no edema  BACK:   ROM: flexion -full /extension -full /lateral rotation- full /side bend- full   Bony tenderness: None   Paraspinal tenderness: Yes.   Sensation: intact in b/l lower extremities.   SKIN: no suspicious lesions or rashes  NEURO: Normal strength and tone, mentation intact and speech normal  PSYCH: mentation appears normal, affect normal/bright

## 2021-12-10 ENCOUNTER — IMMUNIZATION (OUTPATIENT)
Dept: NURSING | Facility: CLINIC | Age: 41
End: 2021-12-10
Payer: COMMERCIAL

## 2021-12-10 PROCEDURE — 91300 PR COVID VAC PFIZER DIL RECON 30 MCG/0.3 ML IM: CPT

## 2021-12-10 PROCEDURE — 0004A PR COVID VAC PFIZER DIL RECON 30 MCG/0.3 ML IM: CPT

## 2021-12-27 RX ORDER — SEMAGLUTIDE 1.34 MG/ML
1 INJECTION, SOLUTION SUBCUTANEOUS WEEKLY
COMMUNITY
Start: 2021-11-30 | End: 2022-03-29

## 2021-12-27 NOTE — PATIENT INSTRUCTIONS
Recommendations from today'sMedication Management (MTM) visit:                                                      Check labs today. If within normal range, increase Jardiance to 25 mg every morning.       To schedule another MTM appointment,please call the clinic directly or you may call   the MTM scheduling line at [phone] or toll-free at [phone].     My MTM pharmacist's contact information:      It was a pleasure speaking with you today. Please feel free to contact me with any questions or concerns you have.      Stephy Oglesby, PharmD, [facility]  Management (MTM) Pharmacist  [facility]     You may receive a survey about the MTM services you received by email, text, and/or US Mail.  I would appreciateyour feedback to help me serve you better in the future. Your comments will be anonymous.

## 2021-12-27 NOTE — PROGRESS NOTES
Medication Therapy Management (MTM) Encounter    ASSESSMENT:                            1. Type 2 diabetes mellitus   Last A1c 7.3%, but due for recheck and recent glucose values via CGM have been elevated. Newly started on Jardiance last month. Will check BMP today and if within normal range, will increase dose to 25 mg daily. Also emphasized importance of dietary changes - following low carb diet and reducing sugar intake. He is committed to make improvements in the new year. If blood sugars still elevated despite these changes, consider adding basal insulin. Depression and anxiety also likely contributing to patient's poor control of diabetes and have been working on medication adjustment in this regard as well.      2. Essential hypertension  Blood pressure is well controlled and meeting goal of <130/80 mm Hg per ACC/AHA hypertension guidelines.     3. Hyperlipidemia  Appropriately on a moderate intensity statin given age and diabetes diagnosis per ACC/AHA guidelines.      4. Anxiety and depression  Some improvement after dose increase in venlafaxine. Situationally, family stressors are contributing to patient's poor mental health. Insomnia adequately controlled on trazodone.     PLAN:                            Check BMP. If within normal range, increase Jardiance to 25 mg every morning.     Follow-up: Return in about 1 month (around 1/28/2022).    SUBJECTIVE/OBJECTIVE:                          Jose Morillo is a 41 year old male called for a follow-up visit. He was referred to me from Leesa Noland CNP.  Today's visit is a follow-up MTM visit from 11/15/21.     Reason for visit: uncontrolled diabetes.    Allergies/ADRs: Reviewed in chart  Past Medical History: Reviewed in chart  Tobacco: He reports that he has never smoked. He has never used smokeless tobacco.  Alcohol: Reviewed in chart    Medication Adherence/Access: History of medication nonadherence/missed doses at times.     1. Type 2 diabetes mellitus    Misael is taking Ozempic 1 mg subcutaneous weekly and Jardiance 10 mg every morning, which was added last month. He is tolerating the medication although states he is urinating quite frequently. Ozempic was a change from Rybelsus earlier this year as he was having trouble remembering to take the Rybelsus every day with the specific administration instructions. He could not tolerate high-dose metformin due to GI side effects, and even at low-dose he was having nausea, dizziness, and headache. The symptoms resolved after he stopped the metformin. This past summer after starting Ozempic, his A1c drastically improved, but now blood sugars have been high again. He uses [employer] CGM for monitoring, but lately not scanning often. Blood sugars have been worse since his knee scope surgery in August. He has gained weight. Unable to be as active as he was previously as he continues to heal. He admits he just gave up and also isn't eating very well.     Hemoglobin A1C   Date Value Ref Range Status   07/23/2021 7.3 (H) 0.0 - 5.6 % Final     Comment:     Normal <5.7%   Prediabetes 5.7-6.4%    Diabetes 6.5% or higher     Note: Adopted from ADA consensus guidelines.   04/23/2021 12.2 (H) <=5.6 % Final   01/15/2021 10.7 (H) <=5.6 % Final      Microalbumin Urine mg/dL   Date Value Ref Range Status   01/15/2021 1.99 0.00 - 1.99 mg/dL Final      No results found for: UCRR  LDL Cholesterol Calculated   Date Value Ref Range Status   01/15/2021 91 <=129 mg/dL Final     Creatinine   Date Value Ref Range Status   07/23/2021 0.95 0.70 - 1.30 mg/dL Final     2. Essential hypertension  Misael is taking lisinopril 5 mg daily. He understands this is for blood pressure. No cough or other issues.      3. Hyperlipidemia  Misael is taking atorvastatin 20 mg every evening. He understands this is for cholesterol treatment. No adverse effects noted.       4. Anxiety and depression  Misael is taking venlafaxine  mg every morning and trazodone 200  mg at bedtime. We increased dose of venlafaxine last month. He has noticed some improvement so far. He was previously on sertraline, but anxiety was not well controlled so switched to venlafaxine last year with improvement. Addition of buspirone in the past was ineffective. He has been down lately and more anxious due to family stressors. He's also upset that his blood sugars are too high. He also takes lorazepam 0.5 mg as needed for anxiety, but takes this rarely, such as before flying. He is interested in medical cannabis, but cannot afford this right now. Has used CBD oil, but hasn't found that very helpful.   Prior to trazodone he was on hydroxyzine, but finds trazodone more effective for insomnia. No grogginess the next day and sleeping through the night.       PHQ 9/18/2020 1/15/2021 7/27/2021   PHQ-9 Total Score 17 13 11   Q9: Thoughts of better off dead/self-harm past 2 weeks Not at all Not at all Not at all     GILDARDO-7 SCORE 9/18/2020 1/15/2021 7/27/2021   Total Score 13 15 12        Vitals:  BP Readings from Last 3 Encounters:   11/17/21 126/87   08/13/21 110/64   07/23/21 114/80      Pulse Readings from Last 3 Encounters:   11/17/21 107   08/13/21 84   07/23/21 80     Wt Readings from Last 3 Encounters:   08/13/21 301 lb 3.2 oz (136.6 kg)   07/23/21 303 lb 9.6 oz (137.7 kg)   04/23/21 298 lb (135.2 kg)      ----------------    I spent 15 minutes with this patient today. All changes were made via collaborative practice agreement with Leesa Noland NP. A copy of the visit note was provided to the patient's primary care provider.    The patient was sent via [employer] a summary of these recommendations.     Stephy Oglesby, PharmD, BCACP  Medication Management (MTM) Pharmacist  [facility]     Telemedicine Visit Details  Type of service:  Telephone visit  Start Time: 11:30 AM  End Time: 11:45 AM  Originating Location (patient location): Home  Distant Location (provider location):   [facility]     Medication Therapy Recommendations  Type 2 diabetes mellitus with hyperglycemia, without long-term current use of insulin (H)    Current Medication: empagliflozin (JARDIANCE) 10 MG TABS tablet   Rationale: Medication requires monitoring - Needs additional monitoring - Safety   Recommendation: Order Lab   Status: Accepted per CPA

## 2021-12-28 ENCOUNTER — LAB (OUTPATIENT)
Dept: LAB | Facility: CLINIC | Age: 41
End: 2021-12-28
Payer: COMMERCIAL

## 2021-12-28 ENCOUNTER — VIRTUAL VISIT (OUTPATIENT)
Dept: PHARMACY | Facility: CLINIC | Age: 41
End: 2021-12-28
Payer: COMMERCIAL

## 2021-12-28 DIAGNOSIS — E11.65 TYPE 2 DIABETES MELLITUS WITH HYPERGLYCEMIA, WITHOUT LONG-TERM CURRENT USE OF INSULIN (H): Primary | ICD-10-CM

## 2021-12-28 DIAGNOSIS — E11.65 TYPE 2 DIABETES MELLITUS WITH HYPERGLYCEMIA, WITHOUT LONG-TERM CURRENT USE OF INSULIN (H): ICD-10-CM

## 2021-12-28 DIAGNOSIS — E78.5 HYPERLIPIDEMIA, UNSPECIFIED HYPERLIPIDEMIA TYPE: ICD-10-CM

## 2021-12-28 DIAGNOSIS — F41.9 ANXIETY AND DEPRESSION: ICD-10-CM

## 2021-12-28 DIAGNOSIS — F32.A ANXIETY AND DEPRESSION: ICD-10-CM

## 2021-12-28 DIAGNOSIS — I10 PRIMARY HYPERTENSION: ICD-10-CM

## 2021-12-28 LAB
ANION GAP SERPL CALCULATED.3IONS-SCNC: 11 MMOL/L (ref 5–18)
BUN SERPL-MCNC: 20 MG/DL (ref 8–22)
CALCIUM SERPL-MCNC: 10.5 MG/DL (ref 8.5–10.5)
CHLORIDE BLD-SCNC: 104 MMOL/L (ref 98–107)
CO2 SERPL-SCNC: 24 MMOL/L (ref 22–31)
CREAT SERPL-MCNC: 1.07 MG/DL (ref 0.7–1.3)
GFR SERPL CREATININE-BSD FRML MDRD: 89 ML/MIN/1.73M2
GLUCOSE BLD-MCNC: 255 MG/DL (ref 70–125)
POTASSIUM BLD-SCNC: 4.6 MMOL/L (ref 3.5–5)
SODIUM SERPL-SCNC: 139 MMOL/L (ref 136–145)

## 2021-12-28 PROCEDURE — 99606 MTMS BY PHARM EST 15 MIN: CPT | Performed by: PHARMACIST

## 2021-12-28 PROCEDURE — 80048 BASIC METABOLIC PNL TOTAL CA: CPT

## 2021-12-28 PROCEDURE — 36415 COLL VENOUS BLD VENIPUNCTURE: CPT

## 2022-01-26 NOTE — PROGRESS NOTES
Medication Therapy Management (MTM) Encounter    ASSESSMENT:                            1. Type 2 diabetes mellitus   Last A1c 7.3%, but due for recheck. Goal A1c of <7% per ADA guidelines. Glucose values as noted on CGM are significantly improved after increase in Jardiance and positive lifestyle changes, especially in his diet. In target range 99% of the time with predicted A1c of 6.2%. Congratulated patient on this and encouraged to continue. Advised rechecking BMP with Jardiance increase along with A1c during follow up with PCP in early March.      2. Essential hypertension  Blood pressure is well controlled and meeting goal of <130/80 mm Hg per ACC/AHA hypertension guidelines.     3. Hyperlipidemia  Appropriately on a moderate intensity statin given age and diabetes diagnosis per ACC/AHA guidelines.      4. Anxiety and depression  Some improvement after dose increase in venlafaxine. Situationally, family stressors are contributing to patient's poor mental health. Insomnia adequately controlled on trazodone.     PLAN:                            No medication changes today.     Check A1c, BMP with PCP in March.     Follow-up: Return in about 2 months (around 3/28/2022).    SUBJECTIVE/OBJECTIVE:                          Jose Morillo is a 41 year old male called for a follow-up visit. He was referred to me from Leesa Noland CNP.  Today's visit is a follow-up MTM visit from 12/28/21.     Reason for visit: uncontrolled diabetes.    Allergies/ADRs: Reviewed in chart  Past Medical History: Reviewed in chart  Tobacco: He reports that he has never smoked. He has never used smokeless tobacco.  Alcohol: Reviewed in chart    Medication Adherence/Access: History of medication nonadherence/missed doses at times.     1. Type 2 diabetes mellitus   Misael is taking Ozempic 1 mg subcutaneous weekly and Jardiance 25 mg every morning, which was increased last month. He is tolerating the medications. Ozempic was a change from  Rybelsus last year as he was having trouble remembering to take the Rybelsus every day with the specific administration instructions. He could not tolerate high-dose metformin due to GI side effects, and even at low-dose he was having nausea, dizziness, and headache. The symptoms resolved after he stopped the metformin. Since the new year, he has made a commitment to eating healthier and drastically reduced carbs. He thinks this is sustainable for him, especially since he sees such a dramatic improvement in his blood sugars. He uses Free[employer] CGM for monitoring. In target range 99% of the time with predicted A1c of 6.2%. No hypoglycemia.      Hemoglobin A1C   Date Value Ref Range Status   07/23/2021 7.3 (H) 0.0 - 5.6 % Final     Comment:     Normal <5.7%   Prediabetes 5.7-6.4%    Diabetes 6.5% or higher     Note: Adopted from ADA consensus guidelines.   04/23/2021 12.2 (H) <=5.6 % Final   01/15/2021 10.7 (H) <=5.6 % Final      Microalbumin Urine mg/dL   Date Value Ref Range Status   01/15/2021 1.99 0.00 - 1.99 mg/dL Final      No results found for: UCRR  LDL Cholesterol Calculated   Date Value Ref Range Status   01/15/2021 91 <=129 mg/dL Final     Creatinine   Date Value Ref Range Status   12/28/2021 1.07 0.70 - 1.30 mg/dL Final     2. Essential hypertension  Misael is taking lisinopril 5 mg daily. He understands this is for blood pressure. No cough or other issues.      3. Hyperlipidemia  Misael is taking atorvastatin 20 mg every evening. He understands this is for cholesterol treatment. No adverse effects noted.       4. Anxiety and depression  Misael is taking venlafaxine  mg every morning and trazodone 200 mg at bedtime. We increased dose of venlafaxine two months ago. He has noticed some improvement so far. He was previously on sertraline, but anxiety was not well controlled so switched to venlafaxine last year with improvement. Addition of buspirone in the past was ineffective. He also takes lorazepam  0.5 mg as needed for anxiety, but takes this rarely, such as before flying. He is interested in medical cannabis, but cannot afford this right now. Has used CBD oil, but hasn't found that very helpful. Prior to trazodone he was on hydroxyzine, but finds trazodone more effective for insomnia. No grogginess the next day and sleeping through the night.       PHQ 9/18/2020 1/15/2021 7/27/2021   PHQ-9 Total Score 17 13 11   Q9: Thoughts of better off dead/self-harm past 2 weeks Not at all Not at all Not at all     GILDARDO-7 SCORE 9/18/2020 1/15/2021 7/27/2021   Total Score 13 15 12        Vitals:  BP Readings from Last 3 Encounters:   11/17/21 126/87   08/13/21 110/64   07/23/21 114/80      Pulse Readings from Last 3 Encounters:   11/17/21 107   08/13/21 84   07/23/21 80     Wt Readings from Last 3 Encounters:   08/13/21 301 lb 3.2 oz (136.6 kg)   07/23/21 303 lb 9.6 oz (137.7 kg)   04/23/21 298 lb (135.2 kg)      ----------------    I spent 15 minutes with this patient today. All changes were made via collaborative practice agreement with Leesa Noland NP. A copy of the visit note was provided to the patient's primary care provider.    The patient was sent via [employer] a summary of these recommendations.     Stephy Oglesby, PharmD, BCACP  Medication Management (MTM) Pharmacist  [facility]     Telemedicine Visit Details  Type of service:  Telephone visit  Start Time: 11:30 AM  End Time: 11:45 AM  Originating Location (patient location): Home  Distant Location (provider location):  [facility]     Medication Therapy Recommendations  No medication therapy recommendations to display

## 2022-01-28 ENCOUNTER — VIRTUAL VISIT (OUTPATIENT)
Dept: PHARMACY | Facility: CLINIC | Age: 42
End: 2022-01-28
Payer: COMMERCIAL

## 2022-01-28 DIAGNOSIS — F32.A ANXIETY AND DEPRESSION: ICD-10-CM

## 2022-01-28 DIAGNOSIS — E11.65 TYPE 2 DIABETES MELLITUS WITH HYPERGLYCEMIA, WITHOUT LONG-TERM CURRENT USE OF INSULIN (H): Primary | ICD-10-CM

## 2022-01-28 DIAGNOSIS — F41.9 ANXIETY AND DEPRESSION: ICD-10-CM

## 2022-01-28 DIAGNOSIS — E78.5 HYPERLIPIDEMIA, UNSPECIFIED HYPERLIPIDEMIA TYPE: ICD-10-CM

## 2022-01-28 DIAGNOSIS — I10 PRIMARY HYPERTENSION: ICD-10-CM

## 2022-01-28 PROCEDURE — 99606 MTMS BY PHARM EST 15 MIN: CPT | Performed by: PHARMACIST

## 2022-01-28 NOTE — PATIENT INSTRUCTIONS
Recommendations from today'sMedication Management (MTM) visit:                                                      Keep up the great work with the dietary changes!     No medication changes today.     Check labs with Leesa in March.       To schedule another MTM appointment,please call the clinic directly or you may call   the MTM scheduling line at [phone] or toll-free at [phone].     My MTM pharmacist's contact information:      It was a pleasure speaking with you today. Please feel free to contact me with any questions or concerns you have.      Stephy Oglesby, PharmD, [facility]  Management (MTM) Pharmacist  [facility]     You may receive a survey about the MTM services you received by email, text, and/or US Mail.  I would appreciateyour feedback to help me serve you better in the future. Your comments will be anonymous.

## 2022-01-30 ENCOUNTER — HEALTH MAINTENANCE LETTER (OUTPATIENT)
Age: 42
End: 2022-01-30

## 2022-02-11 DIAGNOSIS — F41.9 ANXIETY AND DEPRESSION: ICD-10-CM

## 2022-02-11 DIAGNOSIS — F32.A ANXIETY AND DEPRESSION: ICD-10-CM

## 2022-02-11 RX ORDER — VENLAFAXINE HYDROCHLORIDE 150 MG/1
300 TABLET, EXTENDED RELEASE ORAL DAILY
Qty: 180 TABLET | Refills: 0 | Status: SHIPPED | OUTPATIENT
Start: 2022-02-11 | End: 2022-02-15

## 2022-02-14 ENCOUNTER — TELEPHONE (OUTPATIENT)
Dept: FAMILY MEDICINE | Facility: CLINIC | Age: 42
End: 2022-02-14
Payer: COMMERCIAL

## 2022-02-14 DIAGNOSIS — F41.9 ANXIETY AND DEPRESSION: Primary | ICD-10-CM

## 2022-02-14 DIAGNOSIS — F32.A ANXIETY AND DEPRESSION: Primary | ICD-10-CM

## 2022-02-14 NOTE — TELEPHONE ENCOUNTER
TARIK PA REQUEST    Prior Authorization Retail Medication Request    Medication/Dose: venlafaxine  mg - taking 2 capsules daily for dose of 300 mg daily   ICD code (if different than what is on RX):    Previously Tried and Failed:  buspirone, sertraline  Rationale:  Anxiety and depression not well controlled on lower dose. Has been tolerating higher dose with improvement in symptoms.     Insurance Name:  [facility] out of state  Insurance ID:  [id number]      Pharmacy Information (if different than what is on RX)  Name:  [facility]  Phone:  [phone]        *Please forward response to Stephy Oglesby, PharmD*

## 2022-02-14 NOTE — TELEPHONE ENCOUNTER
PA Initiation    Medication: venlafaxine ER 150mg CAPSULES  Insurance Company: [employer] - Phone [phone] Fax [phone]  [facility] the [id number] [place] [address] AT [place] [address]  Filling Pharmacy Phone: [phone]  Filling Pharmacy Fax: [phone]  Start Date: 2/14/2022

## 2022-02-15 RX ORDER — VENLAFAXINE HYDROCHLORIDE 150 MG/1
300 CAPSULE, EXTENDED RELEASE ORAL DAILY
Qty: 180 CAPSULE | Refills: 3 | Status: SHIPPED | OUTPATIENT
Start: 2022-02-15 | End: 2023-02-18

## 2022-02-15 NOTE — TELEPHONE ENCOUNTER
Insurance will only cover capsules- not tablets. Please send new Rx for capsules to pharmacy.    Prior Authorization Approval    Authorization Effective Date: 1/15/2022  Authorization Expiration Date: 2/13/2025  Medication: venlafaxine ER 150mg CAPSULES  Approved Dose/Quantity:   Reference #: [id number]   Insurance Company: [employer] - Phone [phone] Fax [phone]  Which Pharmacy is filling the prescription (Not needed for infusion/clinic administered): [id number] [address] [place] [address]  Pharmacy Notified: No  Patient Notified: No

## 2022-03-08 ENCOUNTER — MYC MEDICAL ADVICE (OUTPATIENT)
Dept: FAMILY MEDICINE | Facility: CLINIC | Age: 42
End: 2022-03-08
Payer: COMMERCIAL

## 2022-03-08 ENCOUNTER — OFFICE VISIT (OUTPATIENT)
Dept: FAMILY MEDICINE | Facility: CLINIC | Age: 42
End: 2022-03-08
Payer: COMMERCIAL

## 2022-03-08 VITALS — DIASTOLIC BLOOD PRESSURE: 70 MMHG | WEIGHT: 292.2 LBS | BODY MASS INDEX: 43.15 KG/M2 | SYSTOLIC BLOOD PRESSURE: 108 MMHG

## 2022-03-08 DIAGNOSIS — E11.65 TYPE 2 DIABETES MELLITUS WITH HYPERGLYCEMIA, WITHOUT LONG-TERM CURRENT USE OF INSULIN (H): Primary | ICD-10-CM

## 2022-03-08 LAB
ANION GAP SERPL CALCULATED.3IONS-SCNC: 14 MMOL/L (ref 5–18)
BUN SERPL-MCNC: 11 MG/DL (ref 8–22)
CALCIUM SERPL-MCNC: 9.7 MG/DL (ref 8.5–10.5)
CHLORIDE BLD-SCNC: 100 MMOL/L (ref 98–107)
CHOLEST SERPL-MCNC: 164 MG/DL
CO2 SERPL-SCNC: 23 MMOL/L (ref 22–31)
CREAT SERPL-MCNC: 0.78 MG/DL (ref 0.7–1.3)
CREAT UR-MCNC: 161 MG/DL
FASTING STATUS PATIENT QL REPORTED: ABNORMAL
GFR SERPL CREATININE-BSD FRML MDRD: >90 ML/MIN/1.73M2
GLUCOSE BLD-MCNC: 103 MG/DL (ref 70–125)
HBA1C MFR BLD: 6.5 % (ref 0–5.6)
HDLC SERPL-MCNC: 39 MG/DL
LDLC SERPL CALC-MCNC: 99 MG/DL
MICROALBUMIN UR-MCNC: 0.75 MG/DL (ref 0–1.99)
MICROALBUMIN/CREAT UR: 4.7 MG/G CR
POTASSIUM BLD-SCNC: 4 MMOL/L (ref 3.5–5)
SODIUM SERPL-SCNC: 137 MMOL/L (ref 136–145)
TRIGL SERPL-MCNC: 131 MG/DL

## 2022-03-08 PROCEDURE — 83036 HEMOGLOBIN GLYCOSYLATED A1C: CPT | Performed by: NURSE PRACTITIONER

## 2022-03-08 PROCEDURE — 80061 LIPID PANEL: CPT | Performed by: NURSE PRACTITIONER

## 2022-03-08 PROCEDURE — 36415 COLL VENOUS BLD VENIPUNCTURE: CPT | Performed by: NURSE PRACTITIONER

## 2022-03-08 PROCEDURE — 80048 BASIC METABOLIC PNL TOTAL CA: CPT | Performed by: NURSE PRACTITIONER

## 2022-03-08 PROCEDURE — 99213 OFFICE O/P EST LOW 20 MIN: CPT | Performed by: NURSE PRACTITIONER

## 2022-03-08 PROCEDURE — 82043 UR ALBUMIN QUANTITATIVE: CPT | Performed by: NURSE PRACTITIONER

## 2022-03-08 ASSESSMENT — PATIENT HEALTH QUESTIONNAIRE - PHQ9
10. IF YOU CHECKED OFF ANY PROBLEMS, HOW DIFFICULT HAVE THESE PROBLEMS MADE IT FOR YOU TO DO YOUR WORK, TAKE CARE OF THINGS AT HOME, OR GET ALONG WITH OTHER PEOPLE: SOMEWHAT DIFFICULT
SUM OF ALL RESPONSES TO PHQ QUESTIONS 1-9: 7
SUM OF ALL RESPONSES TO PHQ QUESTIONS 1-9: 7

## 2022-03-08 NOTE — PROGRESS NOTES
Answers for HPI/ROS submitted by the patient on 3/8/2022  Are you regularly taking any medication or supplement to lower your cholesterol?: Yes  Are you having muscle aches or other side effects that you think could be caused by your cholesterol lowering medication?: No  If you checked off any problems, how difficult have these problems made it for you to do your work, take care of things at home, or get along with other people?: Somewhat difficult  PHQ9 TOTAL SCORE: 7  Do you check your blood pressure regularly outside of the clinic?: No  Are your blood pressures ever more than 140 on the top number (systolic) OR more than 90 on the bottom number (diastolic)? (For example, greater than 140/90): No  Are you following a low salt diet?: Yes  Frequency of checking blood sugars:: four or more times daily  What time of day are you checking your blood sugars : before meals, after meals, before and after meals, at bedtime  Have you had any blood sugars above 200?: Yes  Have you had any blood sugars below 70?: No  Hypoglycemia symptoms:: dizziness  Diabetic concerns:: none  Paraesthesia present:: none of these symptoms  Have you had a diabetic eye exam within the last year?: No  How many servings of fruits and vegetables do you eat daily?: 2-3  On average, how many sweetened beverages do you drink each day (Examples: soda, juice, sweet tea, etc.  Do NOT count diet or artificially sweetened beverages)?: 4  How many minutes a day do you exercise enough to make your heart beat faster?: 30 to 60  How many days a week do you exercise enough to make your heart beat faster?: 3 or less  How many days per week do you miss taking your medication?: 0      Assessment & Plan     Type 2 diabetes mellitus with hyperglycemia, without long-term current use of insulin (H)  Hgb A1c excellent at 6.5.  Continue medications at current doses.   - Hemoglobin A1c  - Basic metabolic panel  - Lipid Profile (Chol, Trig, HDL, LDL calc)  - Albumin  Random Urine Quantitative with Creat Ratio      Return in about 6 months (around 9/8/2022) for Follow up, with me, in person.    Leesa Noland NP  [facility]    Alo Douglas is a 41 year old who presents for a diabetic check.  Patient has been working with MTM closely for this.  Last Hgb A1c was 7.3.  Patient on Jardiance and Ozempic.  He is tolerating these well.  He has been using a CGM, which he really likes and finds it beneficial and tracking his blood sugars.  He has had some higher blood sugars for last couple of weeks as there has been some family stress with his teenage daughter.  Otherwise, he feels like he is doing well.  He denies any hypoglycemic episodes.  He is due for an eye check.    Review of Systems   Pertinent items in HPI      Objective    /70   Wt 132.5 kg (292 lb 3.2 oz)   BMI 43.15 kg/m    Body mass index is 43.15 kg/m .  Physical Exam   GENERAL: healthy, alert and no distress  RESP: lungs clear to auscultation - no rales, rhonchi or wheezes  CV: regular rate and rhythm, normal S1 S2, no S3 or S4, no murmur, click or rub, no peripheral edema

## 2022-03-09 ASSESSMENT — PATIENT HEALTH QUESTIONNAIRE - PHQ9: SUM OF ALL RESPONSES TO PHQ QUESTIONS 1-9: 7

## 2022-04-16 DIAGNOSIS — E11.65 TYPE 2 DIABETES MELLITUS WITH HYPERGLYCEMIA, WITHOUT LONG-TERM CURRENT USE OF INSULIN (H): ICD-10-CM

## 2022-04-18 RX ORDER — FLASH GLUCOSE SENSOR
KIT MISCELLANEOUS
Qty: 6 EACH | Refills: 3 | Status: SHIPPED | OUTPATIENT
Start: 2022-04-18 | End: 2023-08-08

## 2022-04-18 NOTE — TELEPHONE ENCOUNTER
Routing refill request to provider for review/approval because:  Drug not on the [facility] refill protocol   Early refill requested.    Last Written Prescription Date:  5/10/21  Last Fill Quantity: 2,  # refills: 11   Last office visit provider:  3/8/22     Requested Prescriptions   Pending Prescriptions Disp Refills     Continuous Blood Gluc Sensor (FREESTYLE SPEEDY 14 DAY SENSOR) MISC [Pharmacy Med Name: FREESTYLE SPEEDY 14DAY SENSOR]       Sig: USE AS DIRECTED EVERY 14 DAYS       There is no refill protocol information for this order          Magdi Barrett RN 04/18/22 1:33 PM

## 2022-05-13 DIAGNOSIS — G47.00 INSOMNIA, UNSPECIFIED TYPE: ICD-10-CM

## 2022-05-13 DIAGNOSIS — E78.5 HYPERLIPIDEMIA, UNSPECIFIED HYPERLIPIDEMIA TYPE: ICD-10-CM

## 2022-05-15 RX ORDER — ATORVASTATIN CALCIUM 20 MG/1
TABLET, FILM COATED ORAL
Qty: 90 TABLET | Refills: 2 | Status: SHIPPED | OUTPATIENT
Start: 2022-05-15 | End: 2023-02-18

## 2022-05-15 RX ORDER — TRAZODONE HYDROCHLORIDE 100 MG/1
TABLET ORAL
Qty: 180 TABLET | Refills: 1 | Status: SHIPPED | OUTPATIENT
Start: 2022-05-15 | End: 2022-12-16

## 2022-05-15 NOTE — TELEPHONE ENCOUNTER
"Last Written Prescription Date:  2/15/22  Last Fill Quantity: 90,  # refills: 0   Last office visit provider:  3/8/22     Requested Prescriptions   Pending Prescriptions Disp Refills     atorvastatin (LIPITOR) 20 MG tablet [Pharmacy Med Name: ATORVASTATIN 20MG TABLETS] 90 tablet 0     Sig: TAKE 1 TABLET BY MOUTH EVERY DAY AT BEDTIME FOR CHOLESTEROL       Statins Protocol Passed - 5/13/2022  8:55 AM        Passed - LDL on file in past 12 months     Recent Labs   Lab Test 03/08/22  1458   LDL 99             Passed - No abnormal creatine kinase in past 12 months     No lab results found.             Passed - Recent (12 mo) or future (30 days) visit within the authorizing provider's specialty     Patient has had an office visit with the authorizing provider or a provider within the authorizing providers department within the previous 12 mos or has a future within next 30 days. See \"Patient Info\" tab in inbasket, or \"Choose Columns\" in Meds & Orders section of the refill encounter.              Passed - Medication is active on med list        Passed - Patient is age 18 or older             Noni Barlow RN 05/15/22 2:46 PM  "

## 2022-05-15 NOTE — TELEPHONE ENCOUNTER
"Last Written Prescription Date:  10/21/21  Last Fill Quantity: 180,  # refills: 0   Last office visit provider:  3/8/22     Requested Prescriptions   Pending Prescriptions Disp Refills     traZODone (DESYREL) 100 MG tablet [Pharmacy Med Name: TRAZODONE 100MG TABLETS] 180 tablet 0     Sig: TAKE 2 TABLETS(200 MG) BY MOUTH AT BEDTIME       Serotonin Modulators Passed - 5/13/2022  9:00 AM        Passed - Recent (12 mo) or future (30 days) visit within the authorizing provider's specialty     Patient has had an office visit with the authorizing provider or a provider within the authorizing providers department within the previous 12 mos or has a future within next 30 days. See \"Patient Info\" tab in inbasket, or \"Choose Columns\" in Meds & Orders section of the refill encounter.              Passed - Medication is active on med list        Passed - Patient is age 18 or older             Noni Barlow RN 05/15/22 2:37 PM  "

## 2022-07-01 DIAGNOSIS — I10 ESSENTIAL HYPERTENSION: ICD-10-CM

## 2022-07-01 RX ORDER — LISINOPRIL 5 MG/1
TABLET ORAL
Qty: 90 TABLET | Refills: 2 | Status: SHIPPED | OUTPATIENT
Start: 2022-07-01 | End: 2023-04-07

## 2022-07-01 NOTE — TELEPHONE ENCOUNTER
"Last Written Prescription Date:  6/16/21  Last Fill Quantity: 90,  # refills: 3   Last office visit provider:  3/8/22     Requested Prescriptions   Pending Prescriptions Disp Refills     lisinopril (ZESTRIL) 5 MG tablet [Pharmacy Med Name: LISINOPRIL 5MG TABLETS] 90 tablet 3     Sig: TAKE 1 TABLET(5 MG) BY MOUTH DAILY       ACE Inhibitors (Including Combos) Protocol Passed - 7/1/2022  9:32 AM        Passed - Blood pressure under 140/90 in past 12 months     BP Readings from Last 3 Encounters:   03/08/22 108/70   11/17/21 126/87   08/13/21 110/64                 Passed - Recent (12 mo) or future (30 days) visit within the authorizing provider's specialty     Patient has had an office visit with the authorizing provider or a provider within the authorizing providers department within the previous 12 mos or has a future within next 30 days. See \"Patient Info\" tab in inbasket, or \"Choose Columns\" in Meds & Orders section of the refill encounter.              Passed - Medication is active on med list        Passed - Patient is age 18 or older        Passed - Normal serum creatinine on file in past 12 months     Recent Labs   Lab Test 03/08/22  1458   CR 0.78       Ok to refill medication if creatinine is low          Passed - Normal serum potassium on file in past 12 months     Recent Labs   Lab Test 03/08/22  1458   POTASSIUM 4.0                  Rona Whittington 07/01/22 4:14 PM  "

## 2022-07-17 ENCOUNTER — HEALTH MAINTENANCE LETTER (OUTPATIENT)
Age: 42
End: 2022-07-17

## 2022-08-30 DIAGNOSIS — G47.00 INSOMNIA, UNSPECIFIED TYPE: ICD-10-CM

## 2022-08-30 RX ORDER — TRAZODONE HYDROCHLORIDE 100 MG/1
TABLET ORAL
Qty: 180 TABLET | Refills: 1 | OUTPATIENT
Start: 2022-08-30

## 2022-09-08 ENCOUNTER — OFFICE VISIT (OUTPATIENT)
Dept: FAMILY MEDICINE | Facility: CLINIC | Age: 42
End: 2022-09-08
Payer: COMMERCIAL

## 2022-09-08 VITALS
DIASTOLIC BLOOD PRESSURE: 72 MMHG | HEART RATE: 68 BPM | SYSTOLIC BLOOD PRESSURE: 128 MMHG | BODY MASS INDEX: 42.49 KG/M2 | WEIGHT: 287.7 LBS

## 2022-09-08 DIAGNOSIS — E66.01 MORBID OBESITY (H): ICD-10-CM

## 2022-09-08 DIAGNOSIS — I10 ESSENTIAL HYPERTENSION: ICD-10-CM

## 2022-09-08 DIAGNOSIS — F32.A ANXIETY AND DEPRESSION: ICD-10-CM

## 2022-09-08 DIAGNOSIS — E11.9 TYPE 2 DIABETES MELLITUS WITHOUT COMPLICATION, WITHOUT LONG-TERM CURRENT USE OF INSULIN (H): Primary | ICD-10-CM

## 2022-09-08 DIAGNOSIS — F41.9 ANXIETY AND DEPRESSION: ICD-10-CM

## 2022-09-08 LAB — HBA1C MFR BLD: 6.2 % (ref 0–5.6)

## 2022-09-08 PROCEDURE — 90472 IMMUNIZATION ADMIN EACH ADD: CPT | Performed by: NURSE PRACTITIONER

## 2022-09-08 PROCEDURE — 99214 OFFICE O/P EST MOD 30 MIN: CPT | Mod: 25 | Performed by: NURSE PRACTITIONER

## 2022-09-08 PROCEDURE — 90686 IIV4 VACC NO PRSV 0.5 ML IM: CPT | Performed by: NURSE PRACTITIONER

## 2022-09-08 PROCEDURE — 36415 COLL VENOUS BLD VENIPUNCTURE: CPT | Performed by: NURSE PRACTITIONER

## 2022-09-08 PROCEDURE — 90471 IMMUNIZATION ADMIN: CPT | Performed by: NURSE PRACTITIONER

## 2022-09-08 PROCEDURE — 83036 HEMOGLOBIN GLYCOSYLATED A1C: CPT | Performed by: NURSE PRACTITIONER

## 2022-09-08 PROCEDURE — 90677 PCV20 VACCINE IM: CPT | Performed by: NURSE PRACTITIONER

## 2022-09-08 PROCEDURE — 96127 BRIEF EMOTIONAL/BEHAV ASSMT: CPT | Performed by: NURSE PRACTITIONER

## 2022-09-08 PROCEDURE — 99207 PR FOOT EXAM NO CHARGE: CPT | Performed by: NURSE PRACTITIONER

## 2022-09-08 ASSESSMENT — ANXIETY QUESTIONNAIRES
7. FEELING AFRAID AS IF SOMETHING AWFUL MIGHT HAPPEN: SEVERAL DAYS
GAD7 TOTAL SCORE: 8
IF YOU CHECKED OFF ANY PROBLEMS ON THIS QUESTIONNAIRE, HOW DIFFICULT HAVE THESE PROBLEMS MADE IT FOR YOU TO DO YOUR WORK, TAKE CARE OF THINGS AT HOME, OR GET ALONG WITH OTHER PEOPLE: SOMEWHAT DIFFICULT
5. BEING SO RESTLESS THAT IT IS HARD TO SIT STILL: SEVERAL DAYS
3. WORRYING TOO MUCH ABOUT DIFFERENT THINGS: MORE THAN HALF THE DAYS
7. FEELING AFRAID AS IF SOMETHING AWFUL MIGHT HAPPEN: SEVERAL DAYS
6. BECOMING EASILY ANNOYED OR IRRITABLE: SEVERAL DAYS
2. NOT BEING ABLE TO STOP OR CONTROL WORRYING: MORE THAN HALF THE DAYS
GAD7 TOTAL SCORE: 8
1. FEELING NERVOUS, ANXIOUS, OR ON EDGE: SEVERAL DAYS
8. IF YOU CHECKED OFF ANY PROBLEMS, HOW DIFFICULT HAVE THESE MADE IT FOR YOU TO DO YOUR WORK, TAKE CARE OF THINGS AT HOME, OR GET ALONG WITH OTHER PEOPLE?: SOMEWHAT DIFFICULT
4. TROUBLE RELAXING: NOT AT ALL
GAD7 TOTAL SCORE: 8

## 2022-09-08 NOTE — PROGRESS NOTES
Assessment & Plan     Type 2 diabetes mellitus without complication, without long-term current use of insulin (H)  Hgb A1c excellent at 6.2.  Continue same medications.  Follow up in 6 months.   - Hemoglobin A1c    Morbid obesity (H)    Essential hypertension  Well controlled.     Anxiety and depression  Recent stressors, but generally well controlled.       Return in about 6 months (around 3/8/2023).    Leesa Noland NP  [facility] is [facility] 42 year [facility] presenting for a diabetic check.  Patient is currently on Ozempic and Jardiance.  He has been using a CGM, but unfortunately his last 2 have failed and he has been unable to track his blood sugars the last couple of weeks.  Overall, he is feeling well.  He denies any hypoglycemic episodes.  He has not had his vision checked.    He has had a lot more anxiety lately secondary to some family health issues.  His uncle recently passed away and his dad has had a heart attack.  His mother is also dealing with some health issues.  Feels like he is doing relatively well even with these stressors, but sleep has been somewhat difficult.  He is still taking the venlafaxine and trazodone.    No concerns today.    History of Present Illness       Mental Health Follow-up:  Patient presents to follow-up on Anxiety.    Patient's anxiety since last visit has been:  Medium  The patient is not having other symptoms associated with anxiety.  Any significant life events: grief or loss  Patient is not feeling anxious or having panic attacks.  Patient has no concerns about alcohol or drug use.    Diabetes:   He presents for follow up of diabetes.  He is checking home blood glucose three times daily. He checks blood glucose before meals, after meals, before and after meals and at bedtime.  Blood glucose is sometimes over 200 and never under 70. He is aware of hypoglycemia symptoms including shakiness. He is concerned about other.  He is  not experiencing numbness or burning in feet, excessive thirst, blurry vision, weight changes or redness, sores or blisters on feet. The patient has not had a diabetic eye exam in the last 12 months.         Hypertension: He presents for follow up of hypertension.  He does not check blood pressure  regularly outside of the clinic. Outpatient blood pressures have not been over 140/90. He does not follow a low salt diet.    Today's GILDARDO-7 Score: 8       Review of Systems   Pertinent items in HPI      Objective    /72 (BP Location: Right arm, Patient Position: Sitting, Cuff Size: Adult Large)   Pulse 68   Wt 130.5 kg (287 lb 11.2 oz)   BMI 42.49 kg/m    Body mass index is 42.49 kg/m .  Physical Exam   GENERAL: healthy, alert and no distress  SKIN: normal bilateral foot exam  PSYCH: mentation appears normal, affect normal/bright

## 2022-10-03 DIAGNOSIS — E11.65 TYPE 2 DIABETES MELLITUS WITH HYPERGLYCEMIA, WITHOUT LONG-TERM CURRENT USE OF INSULIN (H): ICD-10-CM

## 2022-10-03 RX ORDER — EMPAGLIFLOZIN 25 MG/1
TABLET, FILM COATED ORAL
Qty: 90 TABLET | Refills: 3 | OUTPATIENT
Start: 2022-10-03

## 2022-10-18 ENCOUNTER — E-VISIT (OUTPATIENT)
Dept: FAMILY MEDICINE | Facility: CLINIC | Age: 42
End: 2022-10-18
Payer: COMMERCIAL

## 2022-10-18 DIAGNOSIS — R05.9 COUGH, UNSPECIFIED TYPE: Primary | ICD-10-CM

## 2022-10-18 PROCEDURE — 99421 OL DIG E/M SVC 5-10 MIN: CPT | Performed by: NURSE PRACTITIONER

## 2022-10-18 RX ORDER — CETIRIZINE HYDROCHLORIDE 10 MG/1
10 TABLET ORAL DAILY
Qty: 30 TABLET | Refills: 1 | Status: SHIPPED | OUTPATIENT
Start: 2022-10-18 | End: 2023-02-28

## 2022-10-18 NOTE — PATIENT INSTRUCTIONS
Dear Jose Morillo III    After reviewing your responses, I've been able to diagnose you with likely an allergic cough. Cough due to allergy is caused by mucus from the back of your nose which can travel down the back of your throat and irritate your lungs. This causes swelling and irritation of air passages and causes a cough. Exposure to pollens or dust or cigarette smoke can worsen this.     To treat allergic cough, the main goal is to avoid the triggers if you know what they are and the can be avoided and to treat the nasal congestion that causes coughing. This can be done with allergy medications including antihistamine pills, nasal sprays, and decongestants, many of which are available over the counter.     I have sent Zyrte to the pharmacy you indicated.     If your symptoms worsen or are not improving in 4 days, please contact your primary care provider for an appointment or visit any of our convenient Walk-in Care or Urgent Care Centers to be seen which can be found on our website here.    Thanks again for choosing us as your health care partner,    Leesa Noland NP

## 2022-11-23 ENCOUNTER — OFFICE VISIT (OUTPATIENT)
Dept: FAMILY MEDICINE | Facility: CLINIC | Age: 42
End: 2022-11-23
Payer: COMMERCIAL

## 2022-11-23 VITALS
DIASTOLIC BLOOD PRESSURE: 92 MMHG | HEART RATE: 89 BPM | RESPIRATION RATE: 16 BRPM | SYSTOLIC BLOOD PRESSURE: 136 MMHG | TEMPERATURE: 98.6 F | OXYGEN SATURATION: 98 %

## 2022-11-23 DIAGNOSIS — L03.115 CELLULITIS OF RIGHT LOWER EXTREMITY: Primary | ICD-10-CM

## 2022-11-23 PROCEDURE — 99213 OFFICE O/P EST LOW 20 MIN: CPT | Performed by: FAMILY MEDICINE

## 2022-11-23 RX ORDER — CEPHALEXIN 500 MG/1
500 CAPSULE ORAL 3 TIMES DAILY
Qty: 21 CAPSULE | Refills: 0 | Status: SHIPPED | OUTPATIENT
Start: 2022-11-23 | End: 2022-11-30

## 2022-11-23 RX ORDER — MUPIROCIN 20 MG/G
OINTMENT TOPICAL 3 TIMES DAILY
Qty: 30 G | Refills: 0 | Status: SHIPPED | OUTPATIENT
Start: 2022-11-23 | End: 2023-02-28

## 2022-11-23 NOTE — PATIENT INSTRUCTIONS
Rest, elevate    Continue cares per tattoo place  Topical mupirocin 2-3 times a day     Oral cephalexin three times a day for 7 days for possible secondary skin infection.     Recheck if worse or no better.

## 2022-11-24 NOTE — PROGRESS NOTES
Assessment/Plan:   Cellulitis of right lower extremity  Possible cellulitis at the site of a new tattoo.  Mupirocin topically and cephalexin 3 times daily for 7 days.  Lip as needed  - cephALEXin (KEFLEX) 500 MG capsule; Take 1 capsule (500 mg) by mouth 3 times daily for 7 days  Dispense: 21 capsule; Refill: 0  - mupirocin (BACTROBAN) 2 % external ointment; Apply topically 3 times daily  Dispense: 30 g; Refill: 0      I discussed red flag symptoms, return precautions to clinic/ER and follow up care with patient/parent.  Expected clinical course, symptomatic cares advised. Questions answered. Patient/parent amenable with plan.    Rest, elevate    Continue cares per tattoo place  Topical mupirocin 2-3 times a day     Oral cephalexin three times a day for 7 days for possible secondary skin infection.     Recheck if worse or no better.       Subjective:     Jose Morillo III is a 42 year old male with type 2 diabetes who presents for evaluation of redness and oozing from his right lower leg.  Over a week ago he had a fairly extensive tattoo placed on his right lower leg.  He is very familiar with tattoos and the typical inflammatory reaction that occurs afterwards.  However this 1 in particular seems very slow to improve.  It has become more tender red and warm to the touch.  There is been a clear yellow crusting which is not completely typical of his prior tattoos.  Is using the the typical products to relieve inflammation after a tattoo as well as to prevent infection.  He has no fever.  No foot swelling though the lower leg itself feels swollen.  No chest pain, shortness of breath, other rashes, runny nose cough sore throat or headache.    Allergies   Allergen Reactions     Metformin Nausea, Dizziness and Headache     Current Outpatient Medications   Medication     aspirin 81 MG EC tablet     atorvastatin (LIPITOR) 20 MG tablet     cannabidiol, CBD, (CANNABIDIOL ORAL)     cephALEXin (KEFLEX) 500 MG capsule      cetirizine (ZYRTEC) 10 MG tablet     Continuous Blood Gluc Sensor (FREESTYLE SPEEDY 14 DAY SENSOR) MISC     empagliflozin (JARDIANCE) 25 MG TABS tablet     lisinopril (ZESTRIL) 5 MG tablet     mupirocin (BACTROBAN) 2 % external ointment     OZEMPIC, 1 MG/DOSE, 4 MG/3ML SOPN     traZODone (DESYREL) 100 MG tablet     venlafaxine (EFFEXOR-XR) 150 MG 24 hr capsule     No current facility-administered medications for this visit.     Patient Active Problem List   Diagnosis     Anxiety and depression     Hyperlipemia     Hypertension     Morbid obesity (H)     Type 2 diabetes mellitus with hyperglycemia, without long-term current use of insulin (H)       Objective:     BP (!) 136/92   Pulse 89   Temp 98.6  F (37  C) (Oral)   Resp 16   SpO2 98%     Physical    General Appearance: Alert, pleasant, no distress, afebrile vital signs stable  Head: Normocephalic, without obvious abnormality, atraumatic  Eyes: Conjunctivae are normal.   Lungs: Respirations unlabored  Extremities: No lower extremity edema.  Extensive tattoo on the right lower lateral leg/calf.  Mild surrounding redness warmth faint induration and yellow crusting.  No fluctuance or abscess.  No vesicles.  Skin: As above no other rashes or lesions  Psychiatric: Patient has a normal mood and affect.         This note has been dictated in part using voice recognition software.  Any grammatical or context distortions are unintentional and inherent to the software.  Please feel free to contact me directly for clarification if needed.

## 2022-12-29 DIAGNOSIS — E11.65 TYPE 2 DIABETES MELLITUS WITH HYPERGLYCEMIA, WITHOUT LONG-TERM CURRENT USE OF INSULIN (H): ICD-10-CM

## 2022-12-29 RX ORDER — EMPAGLIFLOZIN 10 MG/1
TABLET, FILM COATED ORAL
Qty: 30 TABLET | Refills: 3 | OUTPATIENT
Start: 2022-12-29

## 2022-12-30 NOTE — TELEPHONE ENCOUNTER
Outpatient Medication Detail     Disp Refills Start End VIC   empagliflozin (JARDIANCE) 10 MG TABS tablet (Discontinued) 30 tablet 3 11/15/2021 12/29/2021 --   Sig - Route: Take 1 tablet (10 mg) by mouth every morning - Oral   Sent to pharmacy as: Empagliflozin 10 MG Oral Tablet (Jardiance)   Class: E-Prescribe

## 2023-02-28 ENCOUNTER — OFFICE VISIT (OUTPATIENT)
Dept: FAMILY MEDICINE | Facility: CLINIC | Age: 43
End: 2023-02-28
Payer: COMMERCIAL

## 2023-02-28 ENCOUNTER — MYC MEDICAL ADVICE (OUTPATIENT)
Dept: FAMILY MEDICINE | Facility: CLINIC | Age: 43
End: 2023-02-28

## 2023-02-28 VITALS
DIASTOLIC BLOOD PRESSURE: 70 MMHG | HEART RATE: 75 BPM | BODY MASS INDEX: 42.53 KG/M2 | SYSTOLIC BLOOD PRESSURE: 132 MMHG | OXYGEN SATURATION: 97 % | TEMPERATURE: 97.5 F | WEIGHT: 288 LBS

## 2023-02-28 DIAGNOSIS — F41.1 GAD (GENERALIZED ANXIETY DISORDER): ICD-10-CM

## 2023-02-28 DIAGNOSIS — E78.5 HYPERLIPIDEMIA, UNSPECIFIED HYPERLIPIDEMIA TYPE: ICD-10-CM

## 2023-02-28 DIAGNOSIS — E11.65 TYPE 2 DIABETES MELLITUS WITH HYPERGLYCEMIA, WITHOUT LONG-TERM CURRENT USE OF INSULIN (H): Primary | ICD-10-CM

## 2023-02-28 DIAGNOSIS — E66.01 MORBID OBESITY (H): ICD-10-CM

## 2023-02-28 DIAGNOSIS — F32.0 CURRENT MILD EPISODE OF MAJOR DEPRESSIVE DISORDER WITHOUT PRIOR EPISODE (H): ICD-10-CM

## 2023-02-28 LAB
ALBUMIN SERPL BCG-MCNC: 4.4 G/DL (ref 3.5–5.2)
ALP SERPL-CCNC: 113 U/L (ref 40–129)
ALT SERPL W P-5'-P-CCNC: 42 U/L (ref 10–50)
ANION GAP SERPL CALCULATED.3IONS-SCNC: 11 MMOL/L (ref 7–15)
AST SERPL W P-5'-P-CCNC: 32 U/L (ref 10–50)
BILIRUB SERPL-MCNC: 0.6 MG/DL
BUN SERPL-MCNC: 18.6 MG/DL (ref 6–20)
CALCIUM SERPL-MCNC: 9.8 MG/DL (ref 8.6–10)
CHLORIDE SERPL-SCNC: 102 MMOL/L (ref 98–107)
CHOLEST SERPL-MCNC: 161 MG/DL
CREAT SERPL-MCNC: 1.07 MG/DL (ref 0.67–1.17)
CREAT UR-MCNC: 139 MG/DL
DEPRECATED HCO3 PLAS-SCNC: 25 MMOL/L (ref 22–29)
GFR SERPL CREATININE-BSD FRML MDRD: 88 ML/MIN/1.73M2
GLUCOSE SERPL-MCNC: 106 MG/DL (ref 70–99)
HBA1C MFR BLD: 6.4 % (ref 0–5.6)
HDLC SERPL-MCNC: 43 MG/DL
LDLC SERPL CALC-MCNC: 84 MG/DL
MICROALBUMIN UR-MCNC: <12 MG/L
MICROALBUMIN/CREAT UR: NORMAL MG/G{CREAT}
NONHDLC SERPL-MCNC: 118 MG/DL
POTASSIUM SERPL-SCNC: 4.9 MMOL/L (ref 3.4–5.3)
PROT SERPL-MCNC: 7.7 G/DL (ref 6.4–8.3)
SODIUM SERPL-SCNC: 138 MMOL/L (ref 136–145)
TRIGL SERPL-MCNC: 169 MG/DL

## 2023-02-28 PROCEDURE — 80061 LIPID PANEL: CPT | Performed by: NURSE PRACTITIONER

## 2023-02-28 PROCEDURE — 99207 E-CONSULT TO BEHAVIORAL HEALTH (ADULT OUTPT PROVIDER TO SPECIALIST WRITTEN QUESTION & RESPONSE): CPT | Performed by: NURSE PRACTITIONER

## 2023-02-28 PROCEDURE — 0124A COVID-19 VACCINE BIVALENT BOOSTER 12+ (PFIZER): CPT | Performed by: NURSE PRACTITIONER

## 2023-02-28 PROCEDURE — 82043 UR ALBUMIN QUANTITATIVE: CPT | Performed by: NURSE PRACTITIONER

## 2023-02-28 PROCEDURE — 36415 COLL VENOUS BLD VENIPUNCTURE: CPT | Performed by: NURSE PRACTITIONER

## 2023-02-28 PROCEDURE — 83036 HEMOGLOBIN GLYCOSYLATED A1C: CPT | Performed by: NURSE PRACTITIONER

## 2023-02-28 PROCEDURE — 80053 COMPREHEN METABOLIC PANEL: CPT | Performed by: NURSE PRACTITIONER

## 2023-02-28 PROCEDURE — 99214 OFFICE O/P EST MOD 30 MIN: CPT | Performed by: NURSE PRACTITIONER

## 2023-02-28 PROCEDURE — 91312 COVID-19 VACCINE BIVALENT BOOSTER 12+ (PFIZER): CPT | Performed by: NURSE PRACTITIONER

## 2023-02-28 PROCEDURE — 82570 ASSAY OF URINE CREATININE: CPT | Performed by: NURSE PRACTITIONER

## 2023-02-28 RX ORDER — VENLAFAXINE HYDROCHLORIDE 150 MG/1
CAPSULE, EXTENDED RELEASE ORAL
Qty: 180 CAPSULE | Refills: 1 | Status: CANCELLED | OUTPATIENT
Start: 2023-02-28

## 2023-02-28 RX ORDER — SEMAGLUTIDE 1.34 MG/ML
INJECTION, SOLUTION SUBCUTANEOUS
Qty: 9 ML | Refills: 3 | Status: CANCELLED | OUTPATIENT
Start: 2023-02-28

## 2023-02-28 ASSESSMENT — ANXIETY QUESTIONNAIRES
GAD7 TOTAL SCORE: 11
GAD7 TOTAL SCORE: 11
6. BECOMING EASILY ANNOYED OR IRRITABLE: MORE THAN HALF THE DAYS
7. FEELING AFRAID AS IF SOMETHING AWFUL MIGHT HAPPEN: SEVERAL DAYS
7. FEELING AFRAID AS IF SOMETHING AWFUL MIGHT HAPPEN: SEVERAL DAYS
1. FEELING NERVOUS, ANXIOUS, OR ON EDGE: MORE THAN HALF THE DAYS
GAD7 TOTAL SCORE: 11
2. NOT BEING ABLE TO STOP OR CONTROL WORRYING: MORE THAN HALF THE DAYS
8. IF YOU CHECKED OFF ANY PROBLEMS, HOW DIFFICULT HAVE THESE MADE IT FOR YOU TO DO YOUR WORK, TAKE CARE OF THINGS AT HOME, OR GET ALONG WITH OTHER PEOPLE?: NOT DIFFICULT AT ALL
IF YOU CHECKED OFF ANY PROBLEMS ON THIS QUESTIONNAIRE, HOW DIFFICULT HAVE THESE PROBLEMS MADE IT FOR YOU TO DO YOUR WORK, TAKE CARE OF THINGS AT HOME, OR GET ALONG WITH OTHER PEOPLE: NOT DIFFICULT AT ALL
4. TROUBLE RELAXING: SEVERAL DAYS
5. BEING SO RESTLESS THAT IT IS HARD TO SIT STILL: SEVERAL DAYS
3. WORRYING TOO MUCH ABOUT DIFFERENT THINGS: MORE THAN HALF THE DAYS

## 2023-02-28 ASSESSMENT — PATIENT HEALTH QUESTIONNAIRE - PHQ9
SUM OF ALL RESPONSES TO PHQ QUESTIONS 1-9: 4
SUM OF ALL RESPONSES TO PHQ QUESTIONS 1-9: 4
10. IF YOU CHECKED OFF ANY PROBLEMS, HOW DIFFICULT HAVE THESE PROBLEMS MADE IT FOR YOU TO DO YOUR WORK, TAKE CARE OF THINGS AT HOME, OR GET ALONG WITH OTHER PEOPLE: SOMEWHAT DIFFICULT

## 2023-02-28 NOTE — PROGRESS NOTES
Assessment & Plan     Type 2 diabetes mellitus with hyperglycemia, without long-term current use of insulin (H)  Optimal control with Hgb A1c of 6.4.  Could consider increasing Ozempic to 2 mg for added weight loss benefit. Will discuss with MTM.   - Hemoglobin A1c  - Comprehensive metabolic panel (BMP + Alb, Alk Phos, ALT, AST, Total. Bili, TP)  - Albumin Random Urine Quantitative with Creat Ratio    Hyperlipidemia, unspecified hyperlipidemia type  On Atorvastatin.   - Lipid panel reflex to direct LDL Non-fasting    Morbid obesity (H)  Congratulated patient on increased exercise!    GILDARDO (generalized anxiety disorder)  Suboptimal control with Effexor.  Previously on Lexapro and Buspar without benefit.  Discussed an e-consult for additional recommendations.  Patient agrees.  Will submit.     Current mild episode of major depressive disorder without prior episode (H)  Controlled.       Return in about 6 months (around 8/28/2023).    Leesa Noland NP  [facility] MERE Douglas is a 42 year old who presents for a diabetic check.  Patient currently on Jardiance and Ozempic.  He has a continuous blood sugar monitor.  Last hemoglobin A1c was 6.2.  Patient has been exercising more regularly only doing boxing.  He has not noticed much weight loss, but is close feel a little looser.  He is overdue for an eye exam.    Patient on venlafaxine for anxiety and depression.  This has historically been difficult to control.  Feels like depression is controlled, but continues to have a lot of anxiety and feeling on edge.  Pacifically at night, his mind races making it difficult to sleep.  He is having some increased work stress.  Patient is not interested in therapy and states he would not go if he was referred.  He was previously on Lexapro and BuSpar with limited benefit.  Denies panic attacks.  He does utilize recreational THC in the evening for anxiety and sleep.  Does not feel like this  makes him more anxious.    History of Present Illness       Mental Health Follow-up:  Patient presents to follow-up on Anxiety.    Patient's anxiety since last visit has been:  Medium  The patient is having other symptoms associated with anxiety.  Any significant life events: job concerns, health concerns and other  Patient is feeling anxious or having panic attacks.  Patient has no concerns about alcohol or drug use.    Diabetes:   He presents for follow up of diabetes.  He is checking home blood glucose a few times a week. He checks blood glucose before meals, after meals, before and after meals and at bedtime.  Blood glucose is sometimes over 200 and never under 70. He is aware of hypoglycemia symptoms including shakiness, dizziness and blurred vision. He is concerned about other.  He is having blurry vision. The patient has not had a diabetic eye exam in the last 12 months.         Hypertension: He presents for follow up of hypertension.  He does not check blood pressure  regularly outside of the clinic. Outpatient blood pressures have not been over 140/90. He follows a low salt diet.     He eats 0-1 servings of fruits and vegetables daily.He consumes 1 sweetened beverage(s) daily.He exercises with enough effort to increase his heart rate 30 to 60 minutes per day.  He exercises with enough effort to increase his heart rate 4 days per week.   He is taking medications regularly.    Today's PHQ-9         PHQ-9 Total Score: 4    PHQ-9 Q9 Thoughts of better off dead/self-harm past 2 weeks :   Not at all    How difficult have these problems made it for you to do your work, take care of things at home, or get along with other people: Somewhat difficult  Today's GILDARDO-7 Score: 11         Review of Systems   Pertinent items in HPI        Objective    /70 (BP Location: Left arm, Patient Position: Sitting, Cuff Size: Adult Large)   Pulse 75   Temp 97.5  F (36.4  C) (Temporal)   Wt 130.6 kg (288 lb)   SpO2 97%    BMI 42.53 kg/m    Body mass index is 42.53 kg/m .  Physical Exam   GENERAL: healthy, alert and no distress  RESP: lungs clear to auscultation - no rales, rhonchi or wheezes  CV: regular rate and rhythm, normal S1 S2, no S3 or S4, no murmur, click or rub, no peripheral edema  PSYCH: mentation appears normal, affect normal

## 2023-02-28 NOTE — Clinical Note
Robin Keyes,  Would it be reasonable to increase Misael's Ozempic to 2 mg for additional weight loss benefit?  His diabetes looks great!    Thanks

## 2023-03-02 ENCOUNTER — E-CONSULT (OUTPATIENT)
Dept: PSYCHIATRY | Facility: CLINIC | Age: 43
End: 2023-03-02
Payer: COMMERCIAL

## 2023-03-02 PROCEDURE — 99451 NTRPROF PH1/NTRNET/EHR 5/>: CPT | Performed by: PSYCHIATRY & NEUROLOGY

## 2023-03-02 NOTE — PROGRESS NOTES
3/2/2023     E-Consult has been accepted.    Interprofessional consultation requested by:  Leesa Noland NP      Clinical Question/Purpose: MY CLINICAL QUESTION IS: Patient currently on Effexor for anxiety and depression.  Depression feels well controlled, but continued to struggle with chronic daily anxiety.  Does not seem to be having panic attacks.  Previously trialed on Lexapro and Buspar with suboptimal benefit.  Looks for advice regarding adding additional medication for anxiety.  Would gabapentin be beneficial?    Patient assessment and information reviewed: chart review    Recommendations:  Before going into medications, I want to harp on the major importance of therapy.  Therapy is the best treatment we have for anxiety.  We get better results with both therapy and medications combined.  I saw that he did not want to do therapy, but this is like trying to treat his diabetes without working on exercise and eating healthier, basically changing some lifestyle habits.  He mentions more stress at work.  Medications will not lessen stress, but therapy can.  Medications alone will not help him sleep (CBT-I is better than medications for sleep) or feel less on edge.  I would hope if he is truly interested in feeling less anxious, sleeping better and improving his overall health, he would consider therapy.   I think this is an important discussion because many believe that mental health is only treated with medications.      As far as medications go, there are several ways we could go.  Adding some prn medications, such as hydroxyzine or propranolol could help take some fot he edge off.  Hydroxyzine 25-50 mg tid prn is the standard prescription.  It can make one sleepy, possibly a benefit at night time.  By giving some flexibility in the dosing, a lower dose can be used before times where one can't get sleepy (ie before work) but more may be helpful at bedtime.      Propranolol 20 mg tid prn is also a good  option.  This works well for physical symptoms of anxiety and feeling on edge.  There is no mention of asthma in my quick chart review, but it should not be used with those with asthma.  It can lower BP and pulse, but is usually well tolerated.  That will need to be monitored.    Gabapentin is another great option.  It works well for anxiety.  It works best when taken three times a day, which is sometimes a negative for people.  The short half life does not keep a steady state, which can cause some ups and down with anxiety through the day if not taking consistently.  It is also better scheduled to get ahead of the anxiety.  Gabapentin 100 mg tid is a good place to start.  Average is 300 mg tid.  Max is 1200 mg tid.  It can make some sleepy, so sometimes a higher dose is used at bedtime.      One other last thought.  THC may not be making anxiety worse in the moment (it often improves anxiety when under the influence), but it absolutely makes anxiety worse in the long run.  It also does not allow the medications he is on or will be on be as effective as they can be.  The simplest way to explain is to consider our brain a bucket of neuro chemicals.  When all is in good balance and the bucket is full, we do well.  When depression or anxiety comes, it decreases the chemicals in the bucket.  The medications fill it back up (ie a faucet).  When using marijuana (or any other substance), it is like punching holes into the bucket.  Sometimes we can turn the faucet on more to counteract the holes, but it is much better to patch the holes than turn on the faucet more.      Thanks for the consult.        The recommendations provided in this E-Consult are based on a review of clinical data pertinent to the clinical question presented, without a review of the patient's complete medical record or, the benefit of a comprehensive in-person or virtual patient evaluation. This consultation should not replace the clinical judgement  and evaluation of the provider ordering this E-Consult. Any new clinical issues, or changes in patient status since the filing of this E-Consult will need to be taken into account when assessing these recommendations. Please contact me if you have further questions.    My total time spent reviewing clinical information and formulating assessment was 20 minutes.        Rico Kaur MD

## 2023-03-06 ENCOUNTER — MYC MEDICAL ADVICE (OUTPATIENT)
Dept: FAMILY MEDICINE | Facility: CLINIC | Age: 43
End: 2023-03-06
Payer: COMMERCIAL

## 2023-03-06 DIAGNOSIS — E11.9 TYPE 2 DIABETES MELLITUS WITHOUT COMPLICATION, WITHOUT LONG-TERM CURRENT USE OF INSULIN (H): ICD-10-CM

## 2023-03-06 DIAGNOSIS — E66.01 MORBID OBESITY (H): Primary | ICD-10-CM

## 2023-03-07 ENCOUNTER — E-VISIT (OUTPATIENT)
Dept: FAMILY MEDICINE | Facility: CLINIC | Age: 43
End: 2023-03-07
Payer: COMMERCIAL

## 2023-03-07 DIAGNOSIS — J02.9 SORE THROAT: Primary | ICD-10-CM

## 2023-03-07 DIAGNOSIS — F33.0 MILD EPISODE OF RECURRENT MAJOR DEPRESSIVE DISORDER (H): ICD-10-CM

## 2023-03-07 PROBLEM — F33.9 MAJOR DEPRESSION, RECURRENT (H): Status: ACTIVE | Noted: 2023-03-07

## 2023-03-07 PROCEDURE — 99421 OL DIG E/M SVC 5-10 MIN: CPT | Performed by: NURSE PRACTITIONER

## 2023-03-07 NOTE — PATIENT INSTRUCTIONS
Dear Jose Morillo III    I placed a lab order for a strep test.  Please call [phone] to schedule the test.  We will treat this if positive.    Thanks for choosing us as your health care partner,    Leesa Noland NP

## 2023-03-08 ENCOUNTER — LAB (OUTPATIENT)
Dept: FAMILY MEDICINE | Facility: CLINIC | Age: 43
End: 2023-03-08
Attending: NURSE PRACTITIONER
Payer: COMMERCIAL

## 2023-03-08 DIAGNOSIS — J02.9 SORE THROAT: ICD-10-CM

## 2023-03-08 LAB
DEPRECATED S PYO AG THROAT QL EIA: NEGATIVE
GROUP A STREP BY PCR: NOT DETECTED

## 2023-03-08 PROCEDURE — 87651 STREP A DNA AMP PROBE: CPT

## 2023-03-08 PROCEDURE — 99207 PR DROP WITH A PROCEDURE: CPT

## 2023-03-08 NOTE — PROGRESS NOTES
Patient is here today for a strep test. Nothing else needed. Patient swabbed and sent home to wait for results.

## 2023-04-07 DIAGNOSIS — I10 ESSENTIAL HYPERTENSION: ICD-10-CM

## 2023-04-07 RX ORDER — LISINOPRIL 5 MG/1
TABLET ORAL
Qty: 90 TABLET | Refills: 3 | Status: SHIPPED | OUTPATIENT
Start: 2023-04-07 | End: 2024-01-02

## 2023-04-08 NOTE — TELEPHONE ENCOUNTER
"Last Written Prescription Date:  7/1/22  Last Fill Quantity:  90,  # refills: 2   Last office visit provider:  2/28/23     Requested Prescriptions   Pending Prescriptions Disp Refills     lisinopril (ZESTRIL) 5 MG tablet [Pharmacy Med Name: LISINOPRIL 5MG TABLETS] 90 tablet 2     Sig: TAKE 1 TABLET(5 MG) BY MOUTH DAILY       ACE Inhibitors (Including Combos) Protocol Passed - 4/7/2023  6:58 AM        Passed - Blood pressure under 140/90 in past 12 months     BP Readings from Last 3 Encounters:   02/28/23 132/70   11/23/22 (!) 136/92   09/08/22 128/72                 Passed - Recent (12 mo) or future (30 days) visit within the authorizing provider's specialty     Patient has had an office visit with the authorizing provider or a provider within the authorizing providers department within the previous 12 mos or has a future within next 30 days. See \"Patient Info\" tab in inbasket, or \"Choose Columns\" in Meds & Orders section of the refill encounter.              Passed - Medication is active on med list        Passed - Patient is age 18 or older        Passed - Normal serum creatinine on file in past 12 months     Recent Labs   Lab Test 02/28/23  1650   CR 1.07       Ok to refill medication if creatinine is low          Passed - Normal serum potassium on file in past 12 months     Recent Labs   Lab Test 02/28/23  1650   POTASSIUM 4.9                  Betty Sweeney RN 04/07/23 9:49 PM  "

## 2023-05-12 ENCOUNTER — TRANSFERRED RECORDS (OUTPATIENT)
Dept: HEALTH INFORMATION MANAGEMENT | Facility: CLINIC | Age: 43
End: 2023-05-12
Payer: COMMERCIAL

## 2023-05-24 ENCOUNTER — TRANSFERRED RECORDS (OUTPATIENT)
Dept: HEALTH INFORMATION MANAGEMENT | Facility: CLINIC | Age: 43
End: 2023-05-24
Payer: COMMERCIAL

## 2023-06-16 ENCOUNTER — MYC REFILL (OUTPATIENT)
Dept: FAMILY MEDICINE | Facility: CLINIC | Age: 43
End: 2023-06-16
Payer: COMMERCIAL

## 2023-06-16 DIAGNOSIS — F32.A ANXIETY AND DEPRESSION: ICD-10-CM

## 2023-06-16 DIAGNOSIS — F41.9 ANXIETY AND DEPRESSION: ICD-10-CM

## 2023-06-16 DIAGNOSIS — E78.5 HYPERLIPIDEMIA, UNSPECIFIED HYPERLIPIDEMIA TYPE: ICD-10-CM

## 2023-06-17 RX ORDER — ATORVASTATIN CALCIUM 20 MG/1
20 TABLET, FILM COATED ORAL AT BEDTIME
Qty: 90 TABLET | Refills: 2 | Status: SHIPPED | OUTPATIENT
Start: 2023-06-17 | End: 2024-01-02

## 2023-06-17 RX ORDER — VENLAFAXINE HYDROCHLORIDE 150 MG/1
300 CAPSULE, EXTENDED RELEASE ORAL DAILY
Qty: 180 CAPSULE | Refills: 0 | Status: SHIPPED | OUTPATIENT
Start: 2023-06-17 | End: 2023-10-05

## 2023-06-17 NOTE — TELEPHONE ENCOUNTER
"Last Written Prescription Date:  2/18/2023  Last Fill Quantity: 90,  # refills: 0   Last office visit provider:  2/28/2023     Requested Prescriptions   Pending Prescriptions Disp Refills     atorvastatin (LIPITOR) 20 MG tablet 90 tablet 0       Statins Protocol Passed - 6/16/2023 11:22 AM        Passed - LDL on file in past 12 months     Recent Labs   Lab Test 02/28/23  1650   LDL 84             Passed - No abnormal creatine kinase in past 12 months     No lab results found.             Passed - Recent (12 mo) or future (30 days) visit within the authorizing provider's specialty     Patient has had an office visit with the authorizing provider or a provider within the authorizing providers department within the previous 12 mos or has a future within next 30 days. See \"Patient Info\" tab in inbasket, or \"Choose Columns\" in Meds & Orders section of the refill encounter.              Passed - Medication is active on med list        Passed - Patient is age 18 or older             Maria Esther Hu RN 06/17/23 2:23 AM  "

## 2023-06-17 NOTE — TELEPHONE ENCOUNTER
"Last Written Prescription Date:  2/18/2023  Last Fill Quantity: 180,  # refills: 0   Last office visit provider:  2/28/2023     Requested Prescriptions   Pending Prescriptions Disp Refills     venlafaxine (EFFEXOR XR) 150 MG 24 hr capsule 180 capsule 0     Sig: Take 2 capsules (300 mg) by mouth daily       Serotonin-Norepinephrine Reuptake Inhibitors  Passed - 6/16/2023 11:20 AM        Passed - Blood pressure under 140/90 in past 12 months     BP Readings from Last 3 Encounters:   02/28/23 132/70   11/23/22 (!) 136/92   09/08/22 128/72                 Passed - PHQ-9 score of less than 5 in past 6 months     Please review last PHQ-9 score.           Passed - Medication is active on med list        Passed - Patient is age 18 or older        Passed - Normal serum creatinine on file in past 12 months     Recent Labs   Lab Test 02/28/23  1650   CR 1.07       Ok to refill medication if creatinine is low          Passed - Recent (6 mo) or future (30 days) visit within the authorizing provider's specialty     Patient had office visit in the last 6 months or has a visit in the next 30 days with authorizing provider or within the authorizing provider's specialty.  See \"Patient Info\" tab in inbasket, or \"Choose Columns\" in Meds & Orders section of the refill encounter.                 Maria Esther Hu RN 06/17/23 2:20 AM  "

## 2023-07-07 DIAGNOSIS — E11.65 TYPE 2 DIABETES MELLITUS WITH HYPERGLYCEMIA, WITHOUT LONG-TERM CURRENT USE OF INSULIN (H): Primary | ICD-10-CM

## 2023-07-18 ENCOUNTER — OFFICE VISIT (OUTPATIENT)
Dept: FAMILY MEDICINE | Facility: CLINIC | Age: 43
End: 2023-07-18
Payer: COMMERCIAL

## 2023-07-18 VITALS
TEMPERATURE: 97.6 F | BODY MASS INDEX: 39.69 KG/M2 | DIASTOLIC BLOOD PRESSURE: 76 MMHG | SYSTOLIC BLOOD PRESSURE: 110 MMHG | HEIGHT: 69 IN | WEIGHT: 268 LBS | HEART RATE: 91 BPM | OXYGEN SATURATION: 96 %

## 2023-07-18 DIAGNOSIS — M25.562 CHRONIC PAIN OF LEFT KNEE: ICD-10-CM

## 2023-07-18 DIAGNOSIS — I10 HYPERTENSION, UNSPECIFIED TYPE: ICD-10-CM

## 2023-07-18 DIAGNOSIS — Z01.818 PREOP GENERAL PHYSICAL EXAM: Primary | ICD-10-CM

## 2023-07-18 DIAGNOSIS — E11.65 TYPE 2 DIABETES MELLITUS WITH HYPERGLYCEMIA, WITHOUT LONG-TERM CURRENT USE OF INSULIN (H): ICD-10-CM

## 2023-07-18 DIAGNOSIS — G89.29 CHRONIC PAIN OF LEFT KNEE: ICD-10-CM

## 2023-07-18 LAB
ANION GAP SERPL CALCULATED.3IONS-SCNC: 12 MMOL/L (ref 7–15)
BUN SERPL-MCNC: 19.2 MG/DL (ref 6–20)
CALCIUM SERPL-MCNC: 10 MG/DL (ref 8.6–10)
CHLORIDE SERPL-SCNC: 106 MMOL/L (ref 98–107)
CREAT SERPL-MCNC: 1.11 MG/DL (ref 0.67–1.17)
DEPRECATED HCO3 PLAS-SCNC: 25 MMOL/L (ref 22–29)
ERYTHROCYTE [DISTWIDTH] IN BLOOD BY AUTOMATED COUNT: 12.4 % (ref 10–15)
GFR SERPL CREATININE-BSD FRML MDRD: 84 ML/MIN/1.73M2
GLUCOSE SERPL-MCNC: 104 MG/DL (ref 70–99)
HBA1C MFR BLD: 5.7 % (ref 0–5.6)
HCT VFR BLD AUTO: 49 % (ref 40–53)
HGB BLD-MCNC: 16 G/DL (ref 13.3–17.7)
MCH RBC QN AUTO: 28.2 PG (ref 26.5–33)
MCHC RBC AUTO-ENTMCNC: 32.7 G/DL (ref 31.5–36.5)
MCV RBC AUTO: 86 FL (ref 78–100)
PLATELET # BLD AUTO: 202 10E3/UL (ref 150–450)
POTASSIUM SERPL-SCNC: 4.9 MMOL/L (ref 3.4–5.3)
RBC # BLD AUTO: 5.67 10E6/UL (ref 4.4–5.9)
SODIUM SERPL-SCNC: 143 MMOL/L (ref 136–145)
WBC # BLD AUTO: 8.8 10E3/UL (ref 4–11)

## 2023-07-18 PROCEDURE — 36415 COLL VENOUS BLD VENIPUNCTURE: CPT | Performed by: NURSE PRACTITIONER

## 2023-07-18 PROCEDURE — 93005 ELECTROCARDIOGRAM TRACING: CPT | Performed by: NURSE PRACTITIONER

## 2023-07-18 PROCEDURE — 80048 BASIC METABOLIC PNL TOTAL CA: CPT | Performed by: NURSE PRACTITIONER

## 2023-07-18 PROCEDURE — 85027 COMPLETE CBC AUTOMATED: CPT | Performed by: NURSE PRACTITIONER

## 2023-07-18 PROCEDURE — 99214 OFFICE O/P EST MOD 30 MIN: CPT | Performed by: NURSE PRACTITIONER

## 2023-07-18 PROCEDURE — 93010 ELECTROCARDIOGRAM REPORT: CPT | Performed by: INTERNAL MEDICINE

## 2023-07-18 PROCEDURE — 83036 HEMOGLOBIN GLYCOSYLATED A1C: CPT | Performed by: NURSE PRACTITIONER

## 2023-07-18 NOTE — PROGRESS NOTES
[facility]  [address] [address]  Phone: [phone]  Fax: [phone]  Primary Provider: Leesa Noland  Pre-op Performing Provider: LEESA NOLAND      PREOPERATIVE EVALUATION:  Today's date: 7/18/2023    Jose Morillo III is a 43 year old male who presents for a preoperative evaluation.    Surgical Information:  Surgery/Procedure: Left meniscus repair  Surgery Location: [facility]  Surgeon: Dr. No  Surgery Date: 08/04/2023  Time of Surgery: TBD  Where patient plans to recover: At home with family  Fax number for surgical facility: [phone]    Assessment & Plan     The proposed surgical procedure is considered INTERMEDIATE risk.    Preop general physical exam  EKG personally reviewed as NSR.  Labs stable.   - EKG 12-lead, tracing only  - Hemoglobin A1c  - CBC with platelets  - Basic metabolic panel    Chronic pain of the left knee    Type 2 diabetes mellitus with hyperglycemia, without long-term current use of insulin (H)  Well controlled.  Hgb A1c of 5.7.    Hypertension, unspecified type  Well controlled.              - No identified additional risk factors other than previously addressed    Antiplatelet or Anticoagulation Medication Instructions:   - aspirin: Discontinue aspirin 7-10 days prior to procedure to reduce bleeding risk. It should be resumed postoperatively.     Additional Medication Instructions:   - ACE/ARB: HOLD on day of surgery (minimum 11 hours for general anesthesia).   - Statins: Continue taking on the day of surgery.    - SGLT2 Inhibitor (canagliflozin, dapagliflozin, or empagliflozin): HOLD 3 days before surgery.    - GLP-1 Injectable (exenitide, liraglutide, semaglutide, dulaglutide, etc.): HOLD 7 days prior to surgery   - Continuous Glucose Monitor (CGM): Patient was made aware on the day of surgery, they should be prepared to remove the Continuous Glucose Monitor (CGM) prior to the operation in  order to avoid damage to the equipment during the procedure. The CGM will not be the source of glucose monitoring during the operation.   - ibuprofen (Advil, Motrin): HOLD 1 day before surgery.    - SSRIs, SNRIs, TCAs, Antipsychotics: Continue without modification.     RECOMMENDATION:  APPROVAL GIVEN to proceed with proposed procedure, without further diagnostic evaluation.        Subjective       HPI related to upcoming procedure:     Patient is scheduled for a left meniscus repair.  He has been having more left knee pain.    History of type 2 diabetes.  This has been well controlled on semaglutide and Jardiance.  He does utilize a continuous glucose monitor intermittently.    On lisinopril for hypertension and kidney protection.        7/18/2023     9:57 AM   Preop Questions   1. Have you ever had a heart attack or stroke? No   2. Have you ever had surgery on your heart or blood vessels, such as a stent placement, a coronary artery bypass, or surgery on an artery in your head, neck, heart, or legs? No   3. Do you have chest pain with activity? No   4. Do you have a history of  heart failure? No   5. Do you currently have a cold, bronchitis or symptoms of other infection? No   6. Do you have a cough, shortness of breath, or wheezing? No   7. Do you or anyone in your family have previous history of blood clots? No   8. Do you or does anyone in your family have a serious bleeding problem such as prolonged bleeding following surgeries or cuts? No   9. Have you ever had problems with anemia or been told to take iron pills? No   10. Have you had any abnormal blood loss such as black, tarry or bloody stools? No   11. Have you ever had a blood transfusion? No   12. Are you willing to have a blood transfusion if it is medically needed before, during, or after your surgery? Yes   13. Have you or any of your relatives ever had problems with anesthesia? No   14. Do you have sleep apnea, excessive snoring or daytime  drowsiness? No   15. Do you have any artifical heart valves or other implanted medical devices like a pacemaker, defibrillator, or continuous glucose monitor? YES - CGM intermittently   16. Do you have artificial joints? No   17. Are you allergic to latex? No       Health Care Directive:  Patient does not have a Health Care Directive or Living Will: Discussed advance care planning with patient; however, patient declined at this time.    Preoperative Review of :   reviewed - no record of controlled substances prescribed.      Review of Systems  CONSTITUTIONAL: NEGATIVE for fever, chills, change in weight  INTEGUMENTARY/SKIN: NEGATIVE for worrisome rashes, moles or lesions  EYES: NEGATIVE for vision changes or irritation  ENT/MOUTH: NEGATIVE for ear, mouth and throat problems  RESP: NEGATIVE for significant cough or SOB  CV: NEGATIVE for chest pain, palpitations or peripheral edema  GI: NEGATIVE for nausea, abdominal pain, heartburn, or change in bowel habits  : NEGATIVE for frequency, dysuria, or hematuria  MUSCULOSKELETAL: NEGATIVE for significant arthralgias or myalgia  NEURO: NEGATIVE for weakness, dizziness or paresthesias  ENDOCRINE: NEGATIVE for temperature intolerance, skin/hair changes  HEME: NEGATIVE for bleeding problems  PSYCHIATRIC: NEGATIVE for changes in mood or affect    Patient Active Problem List    Diagnosis Date Noted     Major depression, recurrent (H) 03/07/2023     Priority: Medium     Type 2 diabetes mellitus with hyperglycemia, without long-term current use of insulin (H) 01/06/2020     Priority: Medium     Morbid obesity (H) 08/27/2019     Priority: Medium     Anxiety and depression 07/11/2018     Priority: Medium     Hyperlipemia 07/11/2018     Priority: Medium     Hypertension 07/11/2018     Priority: Medium      No past medical history on file.  Past Surgical History:   Procedure Laterality Date     AS KNEE SCOPE,MED/LAT MENISCUS REPAIR Right      OPEN REDUCTION INTERNAL FIXATION  "ANKLE Right      Current Outpatient Medications   Medication Sig Dispense Refill     aspirin 81 MG EC tablet [ASPIRIN 81 MG EC TABLET] Take 81 mg by mouth daily.       atorvastatin (LIPITOR) 20 MG tablet Take 1 tablet (20 mg) by mouth At Bedtime 90 tablet 2     cannabidiol, CBD, (CANNABIDIOL ORAL) [CANNABIDIOL, CBD, (CANNABIDIOL ORAL)] Take by mouth as needed.       Continuous Blood Gluc Sensor ([employer]YLE SPEEDY 14 DAY SENSOR) MISC USE AS DIRECTED EVERY 14 DAYS 6 each 3     empagliflozin (JARDIANCE) 25 MG TABS tablet Take 1 tablet (25 mg) by mouth every morning 90 tablet 3     lisinopril (ZESTRIL) 5 MG tablet TAKE 1 TABLET(5 MG) BY MOUTH DAILY 90 tablet 3     Semaglutide, 2 MG/DOSE, (OZEMPIC) 8 MG/3ML pen Inject 2 mg Subcutaneous every 7 days 9 mL 3     traZODone (DESYREL) 100 MG tablet Take 2 tablets (200 mg) by mouth At Bedtime 180 tablet 3     venlafaxine (EFFEXOR XR) 150 MG 24 hr capsule Take 2 capsules (300 mg) by mouth daily 180 capsule 0       Allergies   Allergen Reactions     Metformin Nausea, Dizziness and Headache        Social History     Tobacco Use     Smoking status: Never     Smokeless tobacco: Never   Substance Use Topics     Alcohol use: No     Family History   Problem Relation Age of Onset     Hyperlipidemia Mother      Diabetes Father      Diabetes Type 1 Brother      History   Drug Use No         Objective     /76 (BP Location: Right arm, Patient Position: Sitting, Cuff Size: Adult Large)   Pulse 91   Temp 97.6  F (36.4  C) (Temporal)   Ht 1.755 m (5' 9.09\")   Wt 121.6 kg (268 lb)   SpO2 96%   BMI 39.47 kg/m      Physical Exam    GENERAL APPEARANCE: healthy, alert and no distress     EYES: EOMI,  PERRL     HENT: ear canals and TM's normal and nose and mouth without ulcers or lesions     NECK: no adenopathy, no asymmetry, masses, or scars and thyroid normal to palpation     RESP: lungs clear to auscultation - no rales, rhonchi or wheezes     CV: regular rates and rhythm, normal S1 S2, " no S3 or S4 and no murmur, click or rub     ABDOMEN:  soft, nontender, no HSM or masses and bowel sounds normal     MS: extremities normal- no gross deformities noted, no evidence of inflammation in joints, FROM in all extremities.     SKIN: no suspicious lesions or rashes     NEURO: Normal strength and tone, sensory exam grossly normal, mentation intact and speech normal     PSYCH: mentation appears normal. and affect normal/bright     LYMPHATICS: No cervical adenopathy    Recent Labs   Lab Test 02/28/23  1650 09/08/22  1508 03/08/22  1458     --  137   POTASSIUM 4.9  --  4.0   CR 1.07  --  0.78   A1C 6.4* 6.2* 6.5*        Diagnostics:  Recent Results (from the past 168 hour(s))   EKG 12-lead, tracing only    Collection Time: 07/18/23  3:00 PM   Result Value Ref Range    Systolic Blood Pressure  mmHg    Diastolic Blood Pressure  mmHg    Ventricular Rate 81 BPM    Atrial Rate 81 BPM    WI Interval 140 ms    QRS Duration 90 ms     ms    QTc 418 ms    P Axis -1 degrees    R AXIS 19 degrees    T Axis 21 degrees    Interpretation ECG       Sinus rhythm  Normal ECG  When compared with ECG of 13-AUG-2021 14:32,  No significant change was found     Hemoglobin A1c    Collection Time: 07/18/23  4:31 PM   Result Value Ref Range    Hemoglobin A1C 5.7 (H) 0.0 - 5.6 %   CBC with platelets    Collection Time: 07/18/23  4:31 PM   Result Value Ref Range    WBC Count 8.8 4.0 - 11.0 10e3/uL    RBC Count 5.67 4.40 - 5.90 10e6/uL    Hemoglobin 16.0 13.3 - 17.7 g/dL    Hematocrit 49.0 40.0 - 53.0 %    MCV 86 78 - 100 fL    MCH 28.2 26.5 - 33.0 pg    MCHC 32.7 31.5 - 36.5 g/dL    RDW 12.4 10.0 - 15.0 %    Platelet Count 202 150 - 450 10e3/uL   Basic metabolic panel    Collection Time: 07/18/23  4:31 PM   Result Value Ref Range    Sodium 143 136 - 145 mmol/L    Potassium 4.9 3.4 - 5.3 mmol/L    Chloride 106 98 - 107 mmol/L    Carbon Dioxide (CO2) 25 22 - 29 mmol/L    Anion Gap 12 7 - 15 mmol/L    Urea Nitrogen 19.2 6.0 - 20.0  mg/dL    Creatinine 1.11 0.67 - 1.17 mg/dL    Calcium 10.0 8.6 - 10.0 mg/dL    Glucose 104 (H) 70 - 99 mg/dL    GFR Estimate 84 >60 mL/min/1.73m2      EKG: appears normal, NSR, normal axis, normal intervals, no acute ST/T changes c/w ischemia, no LVH by voltage criteria, unchanged from previous tracings    Revised Cardiac Risk Index (RCRI):  The patient has the following serious cardiovascular risks for perioperative complications:   - No serious cardiac risks = 0 points     RCRI Interpretation: 0 points: Class I (very low risk - 0.4% complication rate)           Signed Electronically by: Leesa Noland NP  Copy of this evaluation report is provided to requesting physician.

## 2023-07-18 NOTE — PATIENT INSTRUCTIONS
For informational purposes only. Not to replace the advice of your health care provider. Copyright   2003,  [facility]. All rights reserved. Clinically reviewed by Milana Ugalde MD. [employer] [id number] - REV .  Preparing for Your Surgery  Getting started  A nurse will call you to review your health history and instructions. They will give you an arrival time based on your scheduled surgery time. Please be ready to share:  Your doctor's clinic name and phone number  Your medical, surgical, and anesthesia history  A list of allergies and sensitivities  A list of medicines, including herbal treatments and over-the-counter drugs  Whether the patient has a legal guardian (ask how to send us the papers in advance)  Please tell us if you're pregnant--or if there's any chance you might be pregnant. Some surgeries may injure a fetus (unborn baby), so they require a pregnancy test. Surgeries that are safe for a fetus don't always need a test, and you can choose whether to have one.   If you have a child who's having surgery, please ask for a copy of Preparing for Your Child's Surgery.    Preparing for surgery  Within 10 to 30 days of surgery: Have a pre-op exam (sometimes called an H&P, or History and Physical). This can be done at a clinic or pre-operative center.  If you're having a , you may not need this exam. Talk to your care team.  At your pre-op exam, talk to your care team about all medicines you take. If you need to stop any medicines before surgery, ask when to start taking them again.  We do this for your safety. Many medicines can make you bleed too much during surgery. Some change how well surgery (anesthesia) drugs work.  Call your insurance company to let them know you're having surgery. (If you don't have insurance, call [phone].)  Call your clinic if there's any change in your health. This includes signs of a cold or flu (sore throat, runny nose, cough, rash, fever). It  also includes a scrape or scratch near the surgery site.  If you have questions on the day of surgery, call your hospital or surgery center.  Eating and drinking guidelines  For your safety: Unless your surgeon tells you otherwise, follow the guidelines below.  Eat and drink as usual until 8 hours before you arrive for surgery. After that, no food or milk.  Drink clear liquids until 2 hours before you arrive. These are liquids you can see through, like water, Gatorade, and Propel Water. They also include plain black coffee and tea (no cream or milk), candy, and breath mints. You can spit out gum when you arrive.  If you drink alcohol: Stop drinking it the night before surgery.  If your care team tells you to take medicine on the morning of surgery, it's okay to take it with a sip of water.  Preventing infection  Shower or bathe the night before and morning of your surgery. Follow the instructions your clinic gave you. (If no instructions, use regular soap.)  Don't shave or clip hair near your surgery site. We'll remove the hair if needed.  Don't smoke or vape the morning of surgery. You may chew nicotine gum up to 2 hours before surgery. A nicotine patch is okay.  Note: Some surgeries require you to completely quit smoking and nicotine. Check with your surgeon.  Your care team will make every effort to keep you safe from infection. We will:  Clean our hands often with soap and water (or an alcohol-based hand rub).  Clean the skin at your surgery site with a special soap that kills germs.  Give you a special gown to keep you warm. (Cold raises the risk of infection.)  Wear special hair covers, masks, gowns and gloves during surgery.  Give antibiotic medicine, if prescribed. Not all surgeries need antibiotics.  What to bring on the day of surgery  Photo ID and insurance card  Copy of your health care directive, if you have one  Glasses and hearing aids (bring cases)  You can't wear contacts during surgery  Inhaler and  eye drops, if you use them (tell us about these when you arrive)  CPAP machine or breathing device, if you use them  A few personal items, if spending the night  If you have . . .  A pacemaker, ICD (cardiac defibrillator) or other implant: Bring the ID card.  An implanted stimulator: Bring the remote control.  A legal guardian: Bring a copy of the certified (court-stamped) guardianship papers.  Please remove any jewelry, including body piercings. Leave jewelry and other valuables at home.  If you're going home the day of surgery  You must have a responsible adult drive you home. They should stay with you overnight as well.  If you don't have someone to stay with you, and you aren't safe to go home alone, we may keep you overnight. Insurance often won't pay for this.  After surgery  If it's hard to control your pain or you need more pain medicine, please call your surgeon's office.  Questions?   If you have any questions for your care team, list them here: _________________________________________________________________________________________________________________________________________________________________________ ____________________________________ ____________________________________ ____________________________________    How to Take Your Medication Before Surgery   - ACE/ARB: HOLD on day of surgery (minimum 11 hours for general anesthesia). LISINOPRIL   - Statins: Continue taking on the day of surgery. ATORVASTATIN   - SGLT2 Inhibitor (canagliflozin, dapagliflozin, or empagliflozin): HOLD 3 days before surgery. JARDIANCE   - GLP-1 Injectable (exenitide, liraglutide, semaglutide, dulaglutide, etc.): HOLD 7 days prior to surgery OZMEPIC   - Continuous Glucose Monitor (CGM): Patient was made aware on the day of surgery, they should be prepared to remove the Continuous Glucose Monitor (CGM) prior to the operation in order to avoid damage to the equipment during the procedure. The CGM will not be the source  of glucose monitoring during the operation.   - ibuprofen (Advil, Motrin): HOLD 1 day before surgery.    - SSRIs, SNRIs, TCAs, Antipsychotics: Continue without modification. EFFEXOR, TRAZODONE    Instructions About Your Continuous Glucose Monitor  You should be prepared to remove the Continuous Glucose Monitor prior to the operation in order to avoid damage to the equipment during the procedure. Your Continuous Glucose Monitor will not be the source of glucose monitoring during the operation.

## 2023-07-21 LAB
ATRIAL RATE - MUSE: 81 BPM
DIASTOLIC BLOOD PRESSURE - MUSE: NORMAL MMHG
INTERPRETATION ECG - MUSE: NORMAL
P AXIS - MUSE: -1 DEGREES
PR INTERVAL - MUSE: 140 MS
QRS DURATION - MUSE: 90 MS
QT - MUSE: 360 MS
QTC - MUSE: 418 MS
R AXIS - MUSE: 19 DEGREES
SYSTOLIC BLOOD PRESSURE - MUSE: NORMAL MMHG
T AXIS - MUSE: 21 DEGREES
VENTRICULAR RATE- MUSE: 81 BPM

## 2023-07-30 ENCOUNTER — HEALTH MAINTENANCE LETTER (OUTPATIENT)
Age: 43
End: 2023-07-30

## 2023-08-04 ENCOUNTER — TRANSFERRED RECORDS (OUTPATIENT)
Dept: HEALTH INFORMATION MANAGEMENT | Facility: CLINIC | Age: 43
End: 2023-08-04
Payer: COMMERCIAL

## 2023-08-07 NOTE — PROGRESS NOTES
Medication Therapy Management (MTM) Encounter    ASSESSMENT:                            1. Type 2 diabetes mellitus   Last A1c 5.7%, well controlled and meeting goal A1c <7% per ADA guidelines. Given patient's goal for additional weight loss and drug shortage of Ozempic 2 mg, discussed option to switch to Mounjaro (tirzepatide) which is a combination GLP-1/GIP agonist, showing superior blood sugar control and weight loss compared to GLP-1 agonists. Patient is agreeable. Medication education provided. Starting dose is 2.5 mg injected weekly, titrating up to maximum dose of 15 mg weekly, if needed and tolerated. Depending on response may be able to stop Jardiance to simplify regimen and reduce cost.   Discussed upgrading to FreeStyle Katalina 3 CGM, which has a smaller sensor size, hypo- and hyper-glycemia alerts, and continuous glucose monitoring without the need for scanning.     2. Essential hypertension  Blood pressure is well controlled and meeting goal of <130/80 mm Hg per ACC/AHA hypertension guidelines.     3. Hyperlipidemia  Appropriately on a moderate intensity statin given age and diabetes diagnosis per ACC/AHA guidelines.      4. Anxiety and depression  Not adequately controlled with increase in PHQ-9 and IGLDARDO-7 scores. Discussed options for medication adjustment and opted to switch venlafaxine to alternative medication, duloxetine. Medication education provided. Will complete cross taper/titration to minimize risk of withdrawal effects. Encouraged patient to meet with therapist as scheduled through his employer.     PLAN:                            Upgrade to Katalina 3 CGM sensors.     Stop Ozempic.     Start Mounjaro. Inject 2.5 mg once weekly. After 4 weeks, increase to 5 mg injected weekly.     Switch antidepressant from venlafaxine to duloxetine -     Week 1:  Decrease venlafaxine ER to 150 mg daily.   Start duloxetine 30 mg daily.     Week 2:   Decrease venlafaxine ER to 75 mg daily.   Increase duloxetine  to 60 mg daily.     Week 3:   Decrease venlafaxine ER to 37.5 mg daily.   Increase duloxetine to 90 mg daily.     Week 4:  Stop venlafaxine ER.  Continue duloxetine 90 mg daily.       Follow-up: Return in about 6 weeks (around 9/19/2023).    SUBJECTIVE/OBJECTIVE:                          Jose Morillo is a 43 year old male called for an initial visit. He was referred to me from Leesa Noland CNP.      Reason for visit: weight loss medication; worsening anxiety and depression    Allergies/ADRs: Reviewed in chart  Past Medical History: Reviewed in chart  Tobacco: He reports that he has never smoked. He has never used smokeless tobacco.  Alcohol: Reviewed in chart    Medication Adherence/Access: History of medication nonadherence/missed doses at times.     1. Type 2 diabetes mellitus   Misael is taking Ozempic 2 mg subcutaneous weekly and Jardiance 25 mg every morning. He is tolerating the medications. Ozempic was a change from Rybelsus as he was having trouble remembering to take the Rybelsus every day with the specific administration instructions. He could not tolerate high-dose metformin due to GI side effects, and even at low-dose he was having nausea, dizziness, and headache. The symptoms resolved after he stopped the metformin.   He's been exercising regularly - boxing 6 days a week. Since February, has lost 20 lbs. Would like to lose more.     He uses FreeStyle Katalina CGM for monitoring. No hypoglycemia.      Hemoglobin A1C   Date Value Ref Range Status   07/18/2023 5.7 (H) 0.0 - 5.6 % Final     Comment:     Normal <5.7%   Prediabetes 5.7-6.4%    Diabetes 6.5% or higher     Note: Adopted from ADA consensus guidelines.   02/28/2023 6.4 (H) 0.0 - 5.6 % Final     Comment:     Normal <5.7%   Prediabetes 5.7-6.4%    Diabetes 6.5% or higher     Note: Adopted from ADA consensus guidelines.   09/08/2022 6.2 (H) 0.0 - 5.6 % Final     Comment:     Normal <5.7%   Prediabetes 5.7-6.4%    Diabetes 6.5% or higher     Note:  Adopted from ADA consensus guidelines.      Microalbumin Urine mg/dL   Date Value Ref Range Status   03/08/2022 0.75 0.00 - 1.99 mg/dL Final      Creatinine Urine mg/dL   Date Value Ref Range Status   02/28/2023 139.0 mg/dL Final     Comment:     The reference ranges have not been established in urine creatinine. The results should be integrated into the clinical context for interpretation.   03/08/2022 161 mg/dL Final     LDL Cholesterol Calculated   Date Value Ref Range Status   02/28/2023 84 <=100 mg/dL Final     Creatinine   Date Value Ref Range Status   07/18/2023 1.11 0.67 - 1.17 mg/dL Final     2. Essential hypertension  Misael is taking lisinopril 5 mg daily. He understands this is for blood pressure. No cough or other issues. Denies side effects. Does not monitor blood pressure at home.      3. Hyperlipidemia  Misael is taking atorvastatin 20 mg every evening. He understands this is for cholesterol treatment. No adverse effects noted.     The 10-year ASCVD risk score (Milton LAL, et al., 2019) is: 2.6%    Values used to calculate the score:      Age: 43 years      Sex: Male      Is Non- : No      Diabetic: Yes      Tobacco smoker: No      Systolic Blood Pressure: 110 mmHg      Is BP treated: Yes      HDL Cholesterol: 43 mg/dL      Total Cholesterol: 161 mg/dL  Recent Labs   Lab Test 02/28/23  1650 03/08/22  1458   CHOL 161 164   HDL 43 39*   LDL 84 99   TRIG 169* 131     4. Anxiety and depression  Misael is taking venlafaxine  mg every morning and trazodone 200 mg at bedtime. Having more depression and anxiety symptoms over the past several months. Trazodone doesn't always help with insomnia.   He finds boxing helpful to clear his mind. Dealing with personal things with family and his work. He has an appointment with a mental health therapist through work.     He was previously on sertraline, but anxiety was not well controlled so switched to venlafaxine last year with improvement.  Addition of buspirone in the past was ineffective. He is interested in medical cannabis, but cannot afford this right now. Has used CBD oil, but hasn't found that very helpful. Prior to trazodone he was on hydroxyzine, but finds trazodone more effective for insomnia. No grogginess the next day and sleeping through the night.           3/8/2022     6:19 AM 2/28/2023     3:52 PM 8/8/2023     8:42 AM   PHQ   PHQ-9 Total Score 7 4 7   Q9: Thoughts of better off dead/self-harm past 2 weeks Not at all Not at all Not at all         9/8/2022     2:59 PM 2/28/2023     3:57 PM 8/8/2023     8:42 AM   GILDARDO-7 SCORE   Total Score 8 (mild anxiety) 11 (moderate anxiety)    Total Score 8 11 14        Vitals:  BP Readings from Last 3 Encounters:   07/18/23 110/76   02/28/23 132/70   11/23/22 (!) 136/92      Pulse Readings from Last 3 Encounters:   07/18/23 91   02/28/23 75   11/23/22 89     Wt Readings from Last 3 Encounters:   07/18/23 268 lb (121.6 kg)   02/28/23 288 lb (130.6 kg)   09/08/22 287 lb 11.2 oz (130.5 kg)      ----------------    I spent 15 minutes with this patient today. All changes were made via collaborative practice agreement with Leesa Noland NP. A copy of the visit note was provided to the patient's primary care provider.    The patient was sent via [employer] a summary of these recommendations.     Stephy Oglesby, PharmD, BCACP  Medication Management (MTM) Pharmacist  [facility]     Telemedicine Visit Details  Type of service:  Telephone visit  Start Time:  11:30 AM  End Time:  11:45 AM     Medication Therapy Recommendations  Anxiety and depression    Current Medication: venlafaxine (EFFEXOR XR) 150 MG 24 hr capsule   Rationale: More effective medication available - Ineffective medication - Effectiveness   Recommendation: Change Medication - DULoxetine 60 MG capsule   Status: Accepted per CPA         Type 2 diabetes mellitus with hyperglycemia, without long-term current use of  insulin (H)    Current Medication: Continuous Blood Gluc Sensor (FREESTYLE SPEEDY 14 DAY SENSOR) MISC (Discontinued)   Rationale: More effective medication available - Ineffective medication - Effectiveness   Recommendation: Change Medication - FreeStyle Speedy 3 Sensor Misc   Status: Accepted per CPA          Current Medication: Semaglutide, 2 MG/DOSE, (OZEMPIC) 8 MG/3ML pen (Discontinued)   Rationale: More effective medication available - Ineffective medication - Effectiveness   Recommendation: Change Medication - Mounjaro 2.5 MG/0.5ML Sopn   Status: Accepted per CPA

## 2023-08-08 ENCOUNTER — VIRTUAL VISIT (OUTPATIENT)
Dept: PHARMACY | Facility: CLINIC | Age: 43
End: 2023-08-08
Payer: COMMERCIAL

## 2023-08-08 DIAGNOSIS — F41.9 ANXIETY AND DEPRESSION: ICD-10-CM

## 2023-08-08 DIAGNOSIS — F32.A ANXIETY AND DEPRESSION: ICD-10-CM

## 2023-08-08 DIAGNOSIS — I10 HYPERTENSION, UNSPECIFIED TYPE: ICD-10-CM

## 2023-08-08 DIAGNOSIS — E11.65 TYPE 2 DIABETES MELLITUS WITH HYPERGLYCEMIA, WITHOUT LONG-TERM CURRENT USE OF INSULIN (H): Primary | ICD-10-CM

## 2023-08-08 DIAGNOSIS — E78.5 HYPERLIPIDEMIA, UNSPECIFIED HYPERLIPIDEMIA TYPE: ICD-10-CM

## 2023-08-08 PROCEDURE — 99605 MTMS BY PHARM NP 15 MIN: CPT | Performed by: PHARMACIST

## 2023-08-08 RX ORDER — BLOOD-GLUCOSE SENSOR
1 EACH MISCELLANEOUS
Qty: 2 EACH | Refills: 11 | Status: SHIPPED | OUTPATIENT
Start: 2023-08-08 | End: 2023-12-15

## 2023-08-08 RX ORDER — VENLAFAXINE HYDROCHLORIDE 37.5 MG/1
CAPSULE, EXTENDED RELEASE ORAL
Qty: 21 CAPSULE | Refills: 0 | Status: SHIPPED | OUTPATIENT
Start: 2023-08-08 | End: 2023-10-17

## 2023-08-08 RX ORDER — TIRZEPATIDE 5 MG/.5ML
5 INJECTION, SOLUTION SUBCUTANEOUS WEEKLY
Qty: 2 ML | Refills: 1 | Status: SHIPPED | OUTPATIENT
Start: 2023-08-08 | End: 2023-10-05 | Stop reason: DRUGHIGH

## 2023-08-08 RX ORDER — TIRZEPATIDE 2.5 MG/.5ML
2.5 INJECTION, SOLUTION SUBCUTANEOUS WEEKLY
Qty: 2 ML | Refills: 0 | Status: SHIPPED | OUTPATIENT
Start: 2023-08-08 | End: 2023-10-05 | Stop reason: DRUGHIGH

## 2023-08-08 RX ORDER — DULOXETIN HYDROCHLORIDE 30 MG/1
CAPSULE, DELAYED RELEASE ORAL
Qty: 90 CAPSULE | Refills: 3 | Status: SHIPPED | OUTPATIENT
Start: 2023-08-08 | End: 2023-10-05

## 2023-08-08 ASSESSMENT — PATIENT HEALTH QUESTIONNAIRE - PHQ9
SUM OF ALL RESPONSES TO PHQ QUESTIONS 1-9: 7
5. POOR APPETITE OR OVEREATING: SEVERAL DAYS

## 2023-08-08 ASSESSMENT — ANXIETY QUESTIONNAIRES
2. NOT BEING ABLE TO STOP OR CONTROL WORRYING: NEARLY EVERY DAY
IF YOU CHECKED OFF ANY PROBLEMS ON THIS QUESTIONNAIRE, HOW DIFFICULT HAVE THESE PROBLEMS MADE IT FOR YOU TO DO YOUR WORK, TAKE CARE OF THINGS AT HOME, OR GET ALONG WITH OTHER PEOPLE: SOMEWHAT DIFFICULT
6. BECOMING EASILY ANNOYED OR IRRITABLE: MORE THAN HALF THE DAYS
1. FEELING NERVOUS, ANXIOUS, OR ON EDGE: NEARLY EVERY DAY
3. WORRYING TOO MUCH ABOUT DIFFERENT THINGS: NEARLY EVERY DAY
7. FEELING AFRAID AS IF SOMETHING AWFUL MIGHT HAPPEN: SEVERAL DAYS
5. BEING SO RESTLESS THAT IT IS HARD TO SIT STILL: SEVERAL DAYS
GAD7 TOTAL SCORE: 14
GAD7 TOTAL SCORE: 14

## 2023-08-08 NOTE — PATIENT INSTRUCTIONS
"Recommendations from today's Medication Management (MTM) visit:                                                            To schedule another MTM appointment, please call the MTM scheduling line at [phone] or toll-free at [phone].     My MTM pharmacist's contact information:      Please feel free to contact me with any questions or concerns you have via [employer] or calling the clinic.       Stephy Oglesby, PharmD, [facility]  Medication Management (MTM) Pharmacist  [facility]     It was great speaking with you today.  I value your experience and would be very thankful for your time in providing feedback in our clinic survey. In the next few days, you may receive an email or text message from [employer] with a link to a survey related to your  clinical pharmacist.\"     "

## 2023-08-17 ENCOUNTER — TRANSFERRED RECORDS (OUTPATIENT)
Dept: HEALTH INFORMATION MANAGEMENT | Facility: CLINIC | Age: 43
End: 2023-08-17
Payer: COMMERCIAL

## 2023-08-30 NOTE — PROGRESS NOTES
Assessment/Plan:     1. Essential hypertension  Optimal control.  Continue Lisinopril at current dose.  - Comprehensive Metabolic Panel  - lisinopril (PRINIVIL,ZESTRIL) 5 MG tablet; Take 1 tablet (5 mg total) by mouth daily.  Dispense: 90 tablet; Refill: 3    2. Anxiety and depression  Well controlled despite some current work stressors.  Continue Zoloft at current dose.  - sertraline (ZOLOFT) 50 MG tablet; Take 1 tablet (50 mg total) by mouth daily.  Dispense: 90 tablet; Refill: 3    3. Hyperlipidemia, unspecified hyperlipidemia type  Patient is not fasting today.  Continue Lipitor.  - Lipid Cascade RANDOM  - atorvastatin (LIPITOR) 20 MG tablet; Take 1 tablet (20 mg total) by mouth at bedtime.  Dispense: 90 tablet; Refill: 3    4. Screening for diabetes mellitus  Previous diagnosis of Type 2 Diabetes, but was able to stay off of medications with a significant weight loss.  He has gained about 30 pounds back.  Will screen for diabetes.  - Glycosylated Hemoglobin A1c    5. Anxiety with flying  Prescribed Lorazepam prior to flying.  Discussed proper use and possible side effects.  Do not mix with alcohol.  - LORazepam (ATIVAN) 0.5 MG tablet; Take 1 tablet by mouth 30 minutes prior to flying. May repeat the dose x1.  Dispense: 10 tablet; Refill: 0    6. Morbid obesity (H)  Encouraged getting back on track with his diet and exercise.        Subjective:     Jose Morillo III is a 39 y.o. male who presents for a medication check.    Patient on Lisinopril for high blood pressure.  He is not checking his blood pressures at home.  Unfortunately, he has gained about 30 pounds in the last year.  He mostly attributes this to work stress and not exercising as much.  His diet has been poor.  He is still doing CrossFit, but not as regularly.  Patient is on a statin medication; tolerating well.  Previously diagnosed with Type II Diabetes, but never started medication.  Instead, he started a diet and exercise program and lost a  significant amount of weight.  Patient has been drinking more water throughout the day and urinating more often.  No recent vision changes or paresthesias.    History of anxiety and depression.  Work has been stressful, as he is working about 100+ hours every 2 weeks.  He has a hard time staying asleep, although he is feeling quite tired.  Has been using Unisom for sleep, which is helpful.  Overall, states mood is stable despite stressors.  He is managing well.    Patient will be flying to [place] next month for vacation with his family.  He has a lot of flight anxiety.  It usually starts the day of travel.  Wondering if there is anything he can do for this.       The following portions of the patient's history were reviewed and updated as appropriate: allergies, current medications.    Review of Systems  A comprehensive review of systems was performed and was otherwise negative    Objective:     /82   Pulse 84   Temp 98.3  F (36.8  C) (Oral)   Wt (!) 324 lb (147 kg)   BMI 47.85 kg/m      General Appearance: Alert, cooperative, no distress, appears stated age  Lungs: Clear to auscultation bilaterally, respirations unlabored  Heart: Regular rate and rhythm, S1 and S2 normal, no murmur, rub, or gallop   Psychologic: appropriate affective, answers all of my questions appropriately. No hallucinations, delusion, or suicidal ideations.    PHQ-9 Total Score: 5 (8/27/2019  3:00 PM)    GILDARDO 7 Total Score: 11 (8/27/2019  3:00 PM)      Leesa Noland NP     Graft Donor Site Bandage (Optional-Leave Blank If You Don't Want In Note): Steri-strips and a pressure bandage were applied to the donor site.

## 2023-10-05 ENCOUNTER — TELEPHONE (OUTPATIENT)
Dept: FAMILY MEDICINE | Facility: CLINIC | Age: 43
End: 2023-10-05

## 2023-10-05 ENCOUNTER — MYC REFILL (OUTPATIENT)
Dept: PHARMACY | Facility: CLINIC | Age: 43
End: 2023-10-05
Payer: COMMERCIAL

## 2023-10-05 DIAGNOSIS — E11.65 TYPE 2 DIABETES MELLITUS WITH HYPERGLYCEMIA, WITHOUT LONG-TERM CURRENT USE OF INSULIN (H): ICD-10-CM

## 2023-10-05 RX ORDER — TIRZEPATIDE 2.5 MG/.5ML
2.5 INJECTION, SOLUTION SUBCUTANEOUS WEEKLY
Qty: 2 ML | Refills: 0 | Status: CANCELLED | OUTPATIENT
Start: 2023-10-05

## 2023-10-05 RX ORDER — TIRZEPATIDE 5 MG/.5ML
5 INJECTION, SOLUTION SUBCUTANEOUS
Qty: 2 ML | Refills: 1 | Status: SHIPPED | OUTPATIENT
Start: 2023-10-05 | End: 2023-10-20

## 2023-10-05 NOTE — TELEPHONE ENCOUNTER
Sent through mounjaro 5 mg weekly for patient with 1 refill per last MTM recommendation. May need to be increased. Patient does have upcoming appt with MTM pharmacist in 2 weeks.

## 2023-10-10 NOTE — TELEPHONE ENCOUNTER
Prior Authorization Approval    Medication: MOUNJARO 5 MG/0.5ML SC SOPN  Authorization Effective Date: 9/10/2023  Authorization Expiration Date: 10/9/2024  Approved Dose/Quantity: 2/28  Reference #: [id number]   Insurance Company: [employer] Non-Specialty PA's - Phone [phone] Fax [phone]  Expected CoPay: $    CoPay Card Available:      Financial Assistance Needed:   Which Pharm[facility] is filling the prescription[id number] [address] AT [place] [address]  Pharmacy Notified: Yes  Patient Notified: Yes

## 2023-10-17 NOTE — PATIENT INSTRUCTIONS
"Recommendations from today's Medication Management (MTM) visit:                                                      Increase Mounjaro to 7.5 mg injected weekly.       To schedule another MTM appointment, please call the MTM scheduling line at [phone] or toll-free at [phone].     My MTM pharmacist's contact information:      Please feel free to contact me with any questions or concerns you have via [email or web address] or calling the clinic.       Stephy Oglesby, PharmD, Psychiatric  Medication Management (MTM) Pharmacist  [facility]     It was great speaking with you today.  I value your experience and would be very thankful for your time in providing feedback in our clinic survey. In the next few days, you may receive an email or text message from [employer] with a link to a survey related to your  clinical pharmacist.\"     "

## 2023-10-17 NOTE — PROGRESS NOTES
Medication Therapy Management (MTM) Encounter    ASSESSMENT:                            1. Type 2 diabetes mellitus   Last A1c 5.7%, well controlled and meeting goal A1c <7% per ADA guidelines. Given patient's goal for additional weight loss and drug shortage of Ozempic 2 mg, have switched to Mounjaro (tirzepatide) which is a combination GLP-1/GIP agonist, showing superior blood sugar control and weight loss compared to GLP-1 agonists. Tolerating well with desire for additional appetite suppression so will continue with dose titration and increase to 7.5 mg weekly.     2. Essential hypertension  Blood pressure is well controlled and meeting goal of <130/80 mm Hg per ACC/AHA hypertension guidelines.     3. Hyperlipidemia  Appropriately on a moderate intensity statin given age and diabetes diagnosis per ACC/AHA guidelines.      4. Anxiety and depression/Insomnia  Noted improvement after switch from venlafaxine to duloxetine. Still poor sleep on high dose trazodone. Discussed option of mirtazapine which may be more effective for insomnia and would work synergistically with duloxetine to also help with mood. Does carry risk of increased appetite/possible weight gain, but patient is on Mounjaro which can help mitigate this risk. Will consider and can discuss more at follow up.     PLAN:                            Increase Mounjaro to 7.5 mg injected weekly.       Follow-up: Return in about 2 months (around 12/20/2023).    SUBJECTIVE/OBJECTIVE:                          Jose Morillo is a 43 year old male called for a follow up visit. He was referred to me from Leesa Noland CNP.  This is a follow up from 8/8/23.     Reason for visit: weight loss medication; anxiety and depression    Allergies/ADRs: Reviewed in chart  Past Medical History: Reviewed in chart  Tobacco: He reports that he has never smoked. He has never used smokeless tobacco.  Alcohol: Reviewed in chart    Medication Adherence/Access: History of medication  nonadherence/missed doses at times.     1. Type 2 diabetes mellitus   Misael is taking Mounjaro 5 mg subcutaneous weekly and Jardiance 25 mg every morning. At our last visit, changed from Ozempic to Mounjaro to help reach weight loss goal. Tolerating well and denies any GI side effects. Has been more hungry.   He could not tolerate metformin due to GI side effects, and even at low-dose he was having nausea, dizziness, and headache. The symptoms resolved after he stopped the metformin.   He's been exercising regularly - boxing 6 days a week.   He uses FreeStyle Katalina CGM for monitoring. No hypoglycemia.        Urine Albumin:   Lab Results   Component Value Date    UMALCR  02/28/2023      Comment:      Unable to calculate, urine albumin and/or urine creatinine is outside detectable limits.  Microalbuminuria is defined as an albumin:creatinine ratio of 17 to 299 for males and 25 to 299 for females. A ratio of albumin:creatinine of 300 or higher is indicative of overt proteinuria.  Due to biologic variability, positive results should be confirmed by a second, first-morning random or 24-hour timed urine specimen. If there is discrepancy, a third specimen is recommended. When 2 out of 3 results are in the microalbuminuria range, this is evidence for incipient nephropathy and warrants increased efforts at glucose control, blood pressure control, and institution of therapy with an angiotensin-converting-enzyme (ACE) inhibitor (if the patient can tolerate it).        Lab Results   Component Value Date    A1C 5.7 (H) 07/18/2023       2. Essential hypertension  Misael is taking lisinopril 5 mg daily. He understands this is for blood pressure. No cough or other issues. Denies side effects. Does not monitor blood pressure at home.      3. Hyperlipidemia  Misael is taking atorvastatin 20 mg every evening. He understands this is for cholesterol treatment. No adverse effects noted.     The 10-year ASCVD risk score (Milton DK, et al.,  2019) is: 2.6%    Values used to calculate the score:      Age: 43 years      Sex: Male      Is Non- : No      Diabetic: Yes      Tobacco smoker: No      Systolic Blood Pressure: 110 mmHg      Is BP treated: Yes      HDL Cholesterol: 43 mg/dL      Total Cholesterol: 161 mg/dL  Recent Labs   Lab Test 02/28/23  1650 03/08/22  1458   CHOL 161 164   HDL 43 39*   LDL 84 99   TRIG 169* 131     4. Anxiety and depression/Insomnia  Misael is taking duloxetine 90 mg every morning and trazodone 200 mg at bedtime. At our last visit, changed from venlafaxine to duloxetine due to uncontrolled depression and anxiety. Not feeling as on edge. Good days and bad days, but less bad days.He finds boxing helpful to clear his mind. Dealing with personal things with family and his work. He had a mental health therapist through his old job, but now has new job and no longer seeing therapist.   He was previously on sertraline, but anxiety was not well controlled so switched to venlafaxine last year with improvement. Addition of buspirone in the past was ineffective.   Trazodone doesn't always help with insomnia very much. No next day grogginess. Sometimes uses THC to help sleep. Prior to trazodone he was on hydroxyzine, but finds trazodone more effective for insomnia. Has tried melatonin, but feels hungover the next morning.           3/8/2022     6:19 AM 2/28/2023     3:52 PM 8/8/2023     8:42 AM   PHQ   PHQ-9 Total Score 7 4 7   Q9: Thoughts of better off dead/self-harm past 2 weeks Not at all Not at all Not at all         9/8/2022     2:59 PM 2/28/2023     3:57 PM 8/8/2023     8:42 AM   GILDARDO-7 SCORE   Total Score 8 (mild anxiety) 11 (moderate anxiety)    Total Score 8 11 14        Vitals:  BP Readings from Last 3 Encounters:   07/18/23 110/76   02/28/23 132/70   11/23/22 (!) 136/92      Pulse Readings from Last 3 Encounters:   07/18/23 91   02/28/23 75   11/23/22 89     Wt Readings from Last 3 Encounters:   07/18/23  268 lb (121.6 kg)   02/28/23 288 lb (130.6 kg)   09/08/22 287 lb 11.2 oz (130.5 kg)      ----------------    I spent 15 minutes with this patient today. All changes were made via collaborative practice agreement with Leesa Noland NP. A copy of the visit note was provided to the patient's primary care provider.    The patient was sent via [employer] a summary of these recommendations.     Stephy Oglesby, PharmD, BCACP  Medication Management (MTM) Pharmacist  [facility]     Telemedicine Visit Details  Type of service:  Telephone visit  Start Time:  1:30 PM  End Time: 1:45 PM     Medication Therapy Recommendations  No medication therapy recommendations to display

## 2023-10-20 ENCOUNTER — VIRTUAL VISIT (OUTPATIENT)
Dept: PHARMACY | Facility: CLINIC | Age: 43
End: 2023-10-20
Payer: COMMERCIAL

## 2023-10-20 DIAGNOSIS — F41.9 ANXIETY AND DEPRESSION: ICD-10-CM

## 2023-10-20 DIAGNOSIS — I10 HYPERTENSION, UNSPECIFIED TYPE: ICD-10-CM

## 2023-10-20 DIAGNOSIS — E78.5 HYPERLIPIDEMIA, UNSPECIFIED HYPERLIPIDEMIA TYPE: ICD-10-CM

## 2023-10-20 DIAGNOSIS — E11.65 TYPE 2 DIABETES MELLITUS WITH HYPERGLYCEMIA, WITHOUT LONG-TERM CURRENT USE OF INSULIN (H): Primary | ICD-10-CM

## 2023-10-20 DIAGNOSIS — F32.A ANXIETY AND DEPRESSION: ICD-10-CM

## 2023-10-20 PROCEDURE — 99207 PR NO CHARGE LOS: CPT | Performed by: PHARMACIST

## 2023-10-20 RX ORDER — TIRZEPATIDE 7.5 MG/.5ML
7.5 INJECTION, SOLUTION SUBCUTANEOUS
Qty: 2 ML | Refills: 3 | Status: SHIPPED | OUTPATIENT
Start: 2023-10-20 | End: 2023-12-15

## 2023-10-20 RX ORDER — DULOXETIN HYDROCHLORIDE 60 MG/1
60 CAPSULE, DELAYED RELEASE ORAL DAILY
COMMUNITY
Start: 2023-10-06 | End: 2023-12-15

## 2023-10-31 ENCOUNTER — MYC REFILL (OUTPATIENT)
Dept: PHARMACY | Facility: CLINIC | Age: 43
End: 2023-10-31
Payer: COMMERCIAL

## 2023-10-31 ENCOUNTER — TELEPHONE (OUTPATIENT)
Dept: FAMILY MEDICINE | Facility: CLINIC | Age: 43
End: 2023-10-31
Payer: COMMERCIAL

## 2023-10-31 ENCOUNTER — MYC REFILL (OUTPATIENT)
Dept: FAMILY MEDICINE | Facility: CLINIC | Age: 43
End: 2023-10-31
Payer: COMMERCIAL

## 2023-10-31 DIAGNOSIS — F32.A ANXIETY AND DEPRESSION: ICD-10-CM

## 2023-10-31 DIAGNOSIS — F41.9 ANXIETY AND DEPRESSION: ICD-10-CM

## 2023-10-31 RX ORDER — DULOXETIN HYDROCHLORIDE 60 MG/1
CAPSULE, DELAYED RELEASE ORAL
Qty: 90 CAPSULE | Refills: 1 | Status: SHIPPED | OUTPATIENT
Start: 2023-10-31 | End: 2024-01-02

## 2023-10-31 RX ORDER — DULOXETIN HYDROCHLORIDE 30 MG/1
90 CAPSULE, DELAYED RELEASE ORAL DAILY
Qty: 90 CAPSULE | Refills: 0 | OUTPATIENT
Start: 2023-10-31

## 2023-10-31 RX ORDER — DULOXETIN HYDROCHLORIDE 30 MG/1
CAPSULE, DELAYED RELEASE ORAL
Qty: 90 CAPSULE | Refills: 1 | Status: SHIPPED | OUTPATIENT
Start: 2023-10-31 | End: 2024-01-02

## 2023-10-31 NOTE — TELEPHONE ENCOUNTER
Central Prior Authorization Team   Phone: [phone]    PA Initiation    Medication: JARDIANCE 25 MG PO TABS  Insurance Company: [employer] ([employer]) - Phone [phone] Fax [phone]  Pharmacy Filling the Rx: [id number] [place] [address] AT [place] [address]  Filling Pharmacy Phone: [phone]  Filling Pharmacy Fax:    Start Date: 10/31/2023

## 2023-10-31 NOTE — TELEPHONE ENCOUNTER
Prior Authorization Approval    Authorization Effective Date: 10/1/2023  Authorization Expiration Date: 10/30/2024  Medication: Jardiance-APPROVED  Reference #:     Insurance Company: [employer] ([employer]) - Phone [phone] Fax [phone]  Which Pharmacy is filling the prescription (Not needed for infusion/clinic administered): [id number] [place], [place] - [facility] [address] AT [place] [address]  Pharmacy Notified: Yes  Patient Notified: Instructed pharmacy to notify patient when script is ready to /ship.

## 2023-11-01 RX ORDER — DULOXETIN HYDROCHLORIDE 60 MG/1
60 CAPSULE, DELAYED RELEASE ORAL DAILY
OUTPATIENT
Start: 2023-11-01

## 2023-12-04 DIAGNOSIS — G47.00 INSOMNIA, UNSPECIFIED TYPE: ICD-10-CM

## 2023-12-04 RX ORDER — TRAZODONE HYDROCHLORIDE 100 MG/1
200 TABLET ORAL AT BEDTIME
Qty: 180 TABLET | Refills: 1 | Status: SHIPPED | OUTPATIENT
Start: 2023-12-04 | End: 2024-06-28

## 2023-12-14 NOTE — PROGRESS NOTES
Medication Therapy Management (MTM) Encounter    ASSESSMENT:                            1. Type 2 diabetes mellitus   Last A1c 5.7%, well controlled and meeting goal A1c <7% per ADA guidelines. Due for recheck so assisted patient in scheduling appointment with PCP. Given patient's goal for additional weight loss and drug shortage of Ozempic 2 mg, have switched to Mounjaro (tirzepatide) which is a combination GLP-1/GIP agonist, showing superior blood sugar control and weight loss compared to GLP-1 agonists. Tolerating well with desire for additional appetite suppression so will continue with dose titration and increase to 10 mg weekly.     2. Essential hypertension  Blood pressure is well controlled and meeting goal of <130/80 mm Hg per ACC/AHA hypertension guidelines.     3. Hyperlipidemia  Appropriately on a moderate intensity statin given age and diabetes diagnosis per ACC/AHA guidelines.      4. Anxiety and depression/Insomnia  Noted improvement after switch from venlafaxine to duloxetine.     PLAN:                            Increase Mounjaro to 10 mg injected weekly.       Follow-up: Return in about 2 months (around 2/15/2024).    SUBJECTIVE/OBJECTIVE:                          Jose Morillo is a 43 year old male called for a follow up visit. He was referred to me from Leesa Noland CNP.  This is a follow up from 10/20/23.    Reason for visit: Mounjaro follow up    Allergies/ADRs: Reviewed in chart  Past Medical History: Reviewed in chart  Tobacco: He reports that he has never smoked. He has never used smokeless tobacco.  Alcohol: Reviewed in chart    Medication Adherence/Access: History of medication nonadherence/missed doses at times.     1. Type 2 diabetes mellitus   Misael is taking Mounjaro 7.5 mg subcutaneous weekly and Jardiance 25 mg every morning. At previous visit, changed from Ozempic to Mounjaro to help reach weight loss goal. Tolerating well and denies any GI side effects. Feels he is noticing some  appetite suppression now, but not losing weight like he feels he should given how he's eating and exercising.   He could not tolerate metformin due to GI side effects, and even at low-dose he was having nausea, dizziness, and headache. The symptoms resolved after he stopped the metformin.   He's been exercising regularly - boxing 6 days a week.   He uses FreeStyle Katalina CGM for monitoring. No hypoglycemia.        Urine Albumin:   Lab Results   Component Value Date    UMALCR  02/28/2023      Comment:      Unable to calculate, urine albumin and/or urine creatinine is outside detectable limits.  Microalbuminuria is defined as an albumin:creatinine ratio of 17 to 299 for males and 25 to 299 for females. A ratio of albumin:creatinine of 300 or higher is indicative of overt proteinuria.  Due to biologic variability, positive results should be confirmed by a second, first-morning random or 24-hour timed urine specimen. If there is discrepancy, a third specimen is recommended. When 2 out of 3 results are in the microalbuminuria range, this is evidence for incipient nephropathy and warrants increased efforts at glucose control, blood pressure control, and institution of therapy with an angiotensin-converting-enzyme (ACE) inhibitor (if the patient can tolerate it).        Lab Results   Component Value Date    A1C 5.7 (H) 07/18/2023       2. Essential hypertension  Misael is taking lisinopril 5 mg daily. He understands this is for blood pressure. No cough or other issues. Denies side effects. Does not monitor blood pressure at home.      3. Hyperlipidemia  Misael is taking atorvastatin 20 mg every evening. He understands this is for cholesterol treatment. No adverse effects noted.     The 10-year ASCVD risk score (Milton LAL, et al., 2019) is: 2.6%    Values used to calculate the score:      Age: 43 years      Sex: Male      Is Non- : No      Diabetic: Yes      Tobacco smoker: No      Systolic Blood  Pressure: 110 mmHg      Is BP treated: Yes      HDL Cholesterol: 43 mg/dL      Total Cholesterol: 161 mg/dL  Recent Labs   Lab Test 02/28/23  1650 03/08/22  1458   CHOL 161 164   HDL 43 39*   LDL 84 99   TRIG 169* 131     4. Anxiety and depression/Insomnia  Misael is taking duloxetine 90 mg every morning and trazodone 200 mg at bedtime. At previous visit, changed from venlafaxine to duloxetine due to uncontrolled depression and anxiety. Not feeling as on edge. Good days and bad days, but less bad days.He finds boxing helpful to clear his mind. Dealing with personal things with family and his work. He had a mental health therapist through his old job, but now has new job and no longer seeing therapist.   He was previously on sertraline, but anxiety was not well controlled so switched to venlafaxine last year with improvement. Addition of buspirone in the past was ineffective.   Taking trazodone 200 mg at bedtime. Says sleep is going ok - some nights wakes up at 3am and can't fall back asleep. Denies side effects to trazodone. Sometimes uses THC to help sleep. Prior to trazodone he was on hydroxyzine, but finds trazodone more effective for insomnia. Has tried melatonin, but feels hungover the next morning.           3/8/2022     6:19 AM 2/28/2023     3:52 PM 8/8/2023     8:42 AM   PHQ   PHQ-9 Total Score 7 4 7   Q9: Thoughts of better off dead/self-harm past 2 weeks Not at all Not at all Not at all         9/8/2022     2:59 PM 2/28/2023     3:57 PM 8/8/2023     8:42 AM   GILDARDO-7 SCORE   Total Score 8 (mild anxiety) 11 (moderate anxiety)    Total Score 8 11 14        Vitals:  BP Readings from Last 3 Encounters:   07/18/23 110/76   02/28/23 132/70   11/23/22 (!) 136/92      Pulse Readings from Last 3 Encounters:   07/18/23 91   02/28/23 75   11/23/22 89     Wt Readings from Last 3 Encounters:   07/18/23 268 lb (121.6 kg)   02/28/23 288 lb (130.6 kg)   09/08/22 287 lb 11.2 oz (130.5 kg)      ----------------    I spent 15  minutes with this patient today. All changes were made via collaborative practice agreement with Leesa Noland NP. A copy of the visit note was provided to the patient's primary care provider.    The patient was sent via [employer] a summary of these recommendations.     Stephy Oglesby, PharmD, BCACP  Medication Management (MTM) Pharmacist  [facility]     Telemedicine Visit Details  Type of service:  Telephone visit  Start Time: 1:00 PM  End Time: 1:15 PM     Medication Therapy Recommendations  Type 2 diabetes mellitus with hyperglycemia, without long-term current use of insulin (H)    Current Medication: tirzepatide (MOUNJARO) 7.5 MG/0.5ML pen (Discontinued)   Rationale: Dose too low - Dosage too low - Effectiveness   Recommendation: Increase Dose   Status: Accepted per CPA

## 2023-12-15 ENCOUNTER — VIRTUAL VISIT (OUTPATIENT)
Dept: PHARMACY | Facility: CLINIC | Age: 43
End: 2023-12-15
Payer: COMMERCIAL

## 2023-12-15 DIAGNOSIS — F41.9 ANXIETY AND DEPRESSION: ICD-10-CM

## 2023-12-15 DIAGNOSIS — E11.65 TYPE 2 DIABETES MELLITUS WITH HYPERGLYCEMIA, WITHOUT LONG-TERM CURRENT USE OF INSULIN (H): Primary | ICD-10-CM

## 2023-12-15 DIAGNOSIS — E78.5 HYPERLIPIDEMIA, UNSPECIFIED HYPERLIPIDEMIA TYPE: ICD-10-CM

## 2023-12-15 DIAGNOSIS — I10 HYPERTENSION, UNSPECIFIED TYPE: ICD-10-CM

## 2023-12-15 DIAGNOSIS — F32.A ANXIETY AND DEPRESSION: ICD-10-CM

## 2023-12-15 PROCEDURE — 99207 PR NO CHARGE LOS: CPT | Performed by: PHARMACIST

## 2023-12-15 RX ORDER — TIRZEPATIDE 10 MG/.5ML
10 INJECTION, SOLUTION SUBCUTANEOUS
Qty: 2 ML | Refills: 3 | Status: SHIPPED | OUTPATIENT
Start: 2023-12-15 | End: 2024-02-23

## 2023-12-15 RX ORDER — BLOOD-GLUCOSE SENSOR
1 EACH MISCELLANEOUS
Qty: 6 EACH | Refills: 3 | Status: SHIPPED | OUTPATIENT
Start: 2023-12-15 | End: 2024-07-03

## 2023-12-15 NOTE — PATIENT INSTRUCTIONS
"Recommendations from today's Medication Management (MTM) visit:                                                      Increase Mounjaro to 10 mg injected weekly.       To schedule another MTM appointment, please call the MTM scheduling line at [phone] or toll-free at [phone].     My MTM pharmacist's contact information:      Please feel free to contact me with any questions or concerns you have via [employer] or calling the clinic.       Stephy Oglesby, PharmD, Psychiatric  Medication Management (MTM) Pharmacist  [facility]     It was great speaking with you today.  I value your experience and would be very thankful for your time in providing feedback in our clinic survey. In the next few days, you may receive an email or text message from [employer] with a link to a survey related to your  clinical pharmacist.\"     "

## 2023-12-22 ENCOUNTER — TRANSFERRED RECORDS (OUTPATIENT)
Dept: HEALTH INFORMATION MANAGEMENT | Facility: CLINIC | Age: 43
End: 2023-12-22
Payer: COMMERCIAL

## 2024-01-02 ENCOUNTER — OFFICE VISIT (OUTPATIENT)
Dept: FAMILY MEDICINE | Facility: CLINIC | Age: 44
End: 2024-01-02
Payer: COMMERCIAL

## 2024-01-02 VITALS
DIASTOLIC BLOOD PRESSURE: 86 MMHG | TEMPERATURE: 97.3 F | BODY MASS INDEX: 40.58 KG/M2 | SYSTOLIC BLOOD PRESSURE: 120 MMHG | HEIGHT: 69 IN | WEIGHT: 274 LBS | OXYGEN SATURATION: 98 % | HEART RATE: 106 BPM | RESPIRATION RATE: 16 BRPM

## 2024-01-02 DIAGNOSIS — I10 ESSENTIAL HYPERTENSION: ICD-10-CM

## 2024-01-02 DIAGNOSIS — F41.9 ANXIETY AND DEPRESSION: ICD-10-CM

## 2024-01-02 DIAGNOSIS — E66.01 MORBID OBESITY (H): ICD-10-CM

## 2024-01-02 DIAGNOSIS — F32.A ANXIETY AND DEPRESSION: ICD-10-CM

## 2024-01-02 DIAGNOSIS — F33.0 MILD EPISODE OF RECURRENT MAJOR DEPRESSIVE DISORDER (H): ICD-10-CM

## 2024-01-02 DIAGNOSIS — R10.9 RIGHT FLANK PAIN: ICD-10-CM

## 2024-01-02 DIAGNOSIS — E11.9 TYPE 2 DIABETES MELLITUS WITHOUT COMPLICATION, WITHOUT LONG-TERM CURRENT USE OF INSULIN (H): Primary | ICD-10-CM

## 2024-01-02 DIAGNOSIS — E78.5 HYPERLIPIDEMIA, UNSPECIFIED HYPERLIPIDEMIA TYPE: ICD-10-CM

## 2024-01-02 PROBLEM — E11.65 TYPE 2 DIABETES MELLITUS WITH HYPERGLYCEMIA, WITHOUT LONG-TERM CURRENT USE OF INSULIN (H): Status: RESOLVED | Noted: 2020-01-06 | Resolved: 2024-01-02

## 2024-01-02 LAB
CHOLEST SERPL-MCNC: 150 MG/DL
CREAT UR-MCNC: 100 MG/DL
FASTING STATUS PATIENT QL REPORTED: YES
HBA1C MFR BLD: 6.4 % (ref 0–5.6)
HDLC SERPL-MCNC: 48 MG/DL
LDLC SERPL CALC-MCNC: 84 MG/DL
MICROALBUMIN UR-MCNC: <12 MG/L
MICROALBUMIN/CREAT UR: NORMAL MG/G{CREAT}
NONHDLC SERPL-MCNC: 102 MG/DL
TRIGL SERPL-MCNC: 90 MG/DL

## 2024-01-02 PROCEDURE — 83036 HEMOGLOBIN GLYCOSYLATED A1C: CPT | Performed by: NURSE PRACTITIONER

## 2024-01-02 PROCEDURE — 82043 UR ALBUMIN QUANTITATIVE: CPT | Performed by: NURSE PRACTITIONER

## 2024-01-02 PROCEDURE — 99214 OFFICE O/P EST MOD 30 MIN: CPT | Mod: 25 | Performed by: NURSE PRACTITIONER

## 2024-01-02 PROCEDURE — 90471 IMMUNIZATION ADMIN: CPT | Performed by: NURSE PRACTITIONER

## 2024-01-02 PROCEDURE — 91320 SARSCV2 VAC 30MCG TRS-SUC IM: CPT | Performed by: NURSE PRACTITIONER

## 2024-01-02 PROCEDURE — 90480 ADMN SARSCOV2 VAC 1/ONLY CMP: CPT | Performed by: NURSE PRACTITIONER

## 2024-01-02 PROCEDURE — 80061 LIPID PANEL: CPT | Performed by: NURSE PRACTITIONER

## 2024-01-02 PROCEDURE — 99207 PR FOOT EXAM NO CHARGE: CPT | Performed by: NURSE PRACTITIONER

## 2024-01-02 PROCEDURE — 90686 IIV4 VACC NO PRSV 0.5 ML IM: CPT | Performed by: NURSE PRACTITIONER

## 2024-01-02 PROCEDURE — 36415 COLL VENOUS BLD VENIPUNCTURE: CPT | Performed by: NURSE PRACTITIONER

## 2024-01-02 PROCEDURE — 82570 ASSAY OF URINE CREATININE: CPT | Performed by: NURSE PRACTITIONER

## 2024-01-02 RX ORDER — LISINOPRIL 5 MG/1
5 TABLET ORAL DAILY
Qty: 90 TABLET | Refills: 3 | Status: SHIPPED | OUTPATIENT
Start: 2024-01-02

## 2024-01-02 RX ORDER — DULOXETIN HYDROCHLORIDE 30 MG/1
CAPSULE, DELAYED RELEASE ORAL
Qty: 90 CAPSULE | Refills: 3 | Status: SHIPPED | OUTPATIENT
Start: 2024-01-02

## 2024-01-02 RX ORDER — DULOXETIN HYDROCHLORIDE 60 MG/1
CAPSULE, DELAYED RELEASE ORAL
Qty: 90 CAPSULE | Refills: 3 | Status: SHIPPED | OUTPATIENT
Start: 2024-01-02

## 2024-01-02 RX ORDER — ATORVASTATIN CALCIUM 20 MG/1
20 TABLET, FILM COATED ORAL AT BEDTIME
Qty: 90 TABLET | Refills: 3 | Status: SHIPPED | OUTPATIENT
Start: 2024-01-02

## 2024-01-02 ASSESSMENT — ANXIETY QUESTIONNAIRES
5. BEING SO RESTLESS THAT IT IS HARD TO SIT STILL: SEVERAL DAYS
7. FEELING AFRAID AS IF SOMETHING AWFUL MIGHT HAPPEN: MORE THAN HALF THE DAYS
8. IF YOU CHECKED OFF ANY PROBLEMS, HOW DIFFICULT HAVE THESE MADE IT FOR YOU TO DO YOUR WORK, TAKE CARE OF THINGS AT HOME, OR GET ALONG WITH OTHER PEOPLE?: NOT DIFFICULT AT ALL
IF YOU CHECKED OFF ANY PROBLEMS ON THIS QUESTIONNAIRE, HOW DIFFICULT HAVE THESE PROBLEMS MADE IT FOR YOU TO DO YOUR WORK, TAKE CARE OF THINGS AT HOME, OR GET ALONG WITH OTHER PEOPLE: NOT DIFFICULT AT ALL
7. FEELING AFRAID AS IF SOMETHING AWFUL MIGHT HAPPEN: MORE THAN HALF THE DAYS
GAD7 TOTAL SCORE: 10
6. BECOMING EASILY ANNOYED OR IRRITABLE: SEVERAL DAYS
1. FEELING NERVOUS, ANXIOUS, OR ON EDGE: SEVERAL DAYS
GAD7 TOTAL SCORE: 10
2. NOT BEING ABLE TO STOP OR CONTROL WORRYING: MORE THAN HALF THE DAYS
GAD7 TOTAL SCORE: 10
4. TROUBLE RELAXING: SEVERAL DAYS
3. WORRYING TOO MUCH ABOUT DIFFERENT THINGS: MORE THAN HALF THE DAYS

## 2024-01-02 ASSESSMENT — PATIENT HEALTH QUESTIONNAIRE - PHQ9
SUM OF ALL RESPONSES TO PHQ QUESTIONS 1-9: 7
10. IF YOU CHECKED OFF ANY PROBLEMS, HOW DIFFICULT HAVE THESE PROBLEMS MADE IT FOR YOU TO DO YOUR WORK, TAKE CARE OF THINGS AT HOME, OR GET ALONG WITH OTHER PEOPLE: NOT DIFFICULT AT ALL
SUM OF ALL RESPONSES TO PHQ QUESTIONS 1-9: 7

## 2024-01-02 NOTE — PROGRESS NOTES
"  Assessment & Plan     Type 2 diabetes mellitus without complication, without long-term current use of insulin (H)  Hgb A1c elevated from previous to 6.4.  Continue with increased dose of Mounjaro and Jardiance.  Recheck in 6 months.  - FOOT EXAM  - Hemoglobin A1c  - Albumin Random Urine Quantitative with Creat Ratio  - empagliflozin (JARDIANCE) 25 MG TABS tablet  Dispense: 90 tablet; Refill: 3    Mild episode of recurrent major depressive disorder (H24)  Controlled on Duloxetine    Morbid obesity (H)    Right flank pain  I suspect this is musculoskeletal.  I did add on a UA (dirty) to assess for any blood in his urine.  If there is any microscopic blood, will send for CT to rule out kidney stone.  Otherwise, recommend compresses and continued chiropractic/massage if helpful.  Can consider PT referral.  - UA with Microscopic reflex to Culture - lab collect    Essential hypertension  Controlled  - lisinopril (ZESTRIL) 5 MG tablet  Dispense: 90 tablet; Refill: 3    Anxiety and depression  Controlled.  - DULoxetine (CYMBALTA) 30 MG capsule  Dispense: 90 capsule; Refill: 3  - DULoxetine (CYMBALTA) 60 MG capsule  Dispense: 90 capsule; Refill: 3    Hyperlipidemia, unspecified hyperlipidemia type  - Lipid panel reflex to direct LDL Fasting  - atorvastatin (LIPITOR) 20 MG tablet  Dispense: 90 tablet; Refill: 3         BMI:   Estimated body mass index is 40.36 kg/m  as calculated from the following:    Height as of this encounter: 1.755 m (5' 9.09\").    Weight as of this encounter: 124.3 kg (274 lb).       Leesa Noland NP  [facility]    Alo Douglas is a 43 year old who presents for diabetic check.  Patient recently saw MTM and his Mounjaro was increased to 10 mg.  He will start the increased dose next week.  Also on Jardiance.  Patient intermittently uses a CGM.  Unfortunately, it tends to come off when he exercises.  He does find it beneficial.  Overdue for eye exam.    Mood stable " on duloxetine.    Patient complains of right flank pain for a couple of months.  Pain is intermittent and describes it as dull and sharp.  He primarily has pain when sitting or relaxing.  Does not have pain when he stands, walks, or exercises.  Slight increase in urinary frequency, but no dysuria or hematuria.  No history of kidney stone.  Chiropractic care and massage therapy helps for short-term.    Follow Up        History of Present Illness       Mental Health Follow-up:  Patient presents to follow-up on Depression & Anxiety.Patient's depression since last visit has been:  No change  The patient is not having other symptoms associated with depression.  Patient's anxiety since last visit has been:  Medium  The patient is having other symptoms associated with anxiety.  Any significant life events: job concerns, financial concerns, housing concerns and health concerns  Patient is feeling anxious or having panic attacks.  Patient has no concerns about alcohol or drug use.    Diabetes:   He presents for follow up of diabetes.  He is checking home blood glucose a few times a month.   He checks blood glucose before and after meals.  Blood glucose is never over 200 and never under 70. He is aware of hypoglycemia symptoms including shakiness, dizziness and blurred vision.   He is concerned about frequent infections and blood sugar frequently over 200.   He is having weight gain.  The patient has not had a diabetic eye exam in the last 12 months.          Hyperlipidemia:  He presents for follow up of hyperlipidemia.   He is taking medication to lower cholesterol. He is not having myalgia or other side effects to statin medications.    Hypertension: He presents for follow up of hypertension.  He does not check blood pressure  regularly outside of the clinic. Outpatient blood pressures have not been over 140/90. He does not follow a low salt diet.     Headaches:   Since the patient's last clinic visit, headaches are: no  "change  The patient is getting headaches:  1  He is able to do normal daily activities when he has a migraine.  The patient is taking the following rescue/relief medications:  Ibuprofen (Advil, Motrin) and Tylenol   Patient states \"I get only a small amount of relief\" from the rescue/relief medications.   The patient is taking the following medications to prevent migraines:  No medications to prevent migraines  In the past 4 weeks, the patient has gone to an Urgent Care or Emergency Room 0 times times due to headaches.    He eats 0-1 servings of fruits and vegetables daily.He consumes 3 sweetened beverage(s) daily.He exercises with enough effort to increase his heart rate 30 to 60 minutes per day.  He exercises with enough effort to increase his heart rate 6 days per week. He is missing 1 dose(s) of medications per week.         Review of Systems   Pertinent items in HPI        Objective    /86 (BP Location: Right arm, Patient Position: Sitting, Cuff Size: Adult Large)   Pulse 106   Temp 97.3  F (36.3  C) (Temporal)   Resp 16   Ht 1.755 m (5' 9.09\")   Wt 124.3 kg (274 lb)   SpO2 98%   BMI 40.36 kg/m    Body mass index is 40.36 kg/m .  Physical Exam   GENERAL: healthy, alert and no distress  RESP: lungs clear to auscultation - no rales, rhonchi or wheezes  CV: regular rate and rhythm, normal S1 S2, no S3 or S4, no murmur, click or rub, no peripheral edema  BACK: no CVA tenderness, no paralumbar tenderness  Diabetic foot exam: normal DP and PT pulses, no trophic changes or ulcerative lesions, and normal sensory exam                  "

## 2024-02-19 PROBLEM — E11.9 TYPE 2 DIABETES MELLITUS WITHOUT COMPLICATION, WITHOUT LONG-TERM CURRENT USE OF INSULIN (H): Status: ACTIVE | Noted: 2020-01-06

## 2024-02-19 NOTE — PROGRESS NOTES
Clinical Pharmacy Consult:                                                    Jose Morillo is a 43 year old male called for a clinical pharmacist consult.  He was referred to me from Leesa Noland CNP.  Patient's insurance does not cover medication management (MTM) services, so this is a free of charge consult visit.      Reason for Consult: Mounjaro    Discussion:   Currently taking Mounjaro 10 mg subcutaneous weekly and Jardiance 25 mg every morning.   Was changed from Ozempic to Mounjaro to help reach weight loss goal. Tolerating well and denies any GI side effects. Feels he is noticing some appetite suppression now and weight loss. Down to 265 lbs.   He could not tolerate metformin due to GI side effects, and even at low-dose he was having nausea, dizziness, and headache. The symptoms resolved after he stopped the metformin.   He's been exercising regularly - boxing 6 days a week.   He uses FreeStyle Katalina CGM for monitoring. No hypoglycemia.        Plan:  1. Increase Mounjaro to 12.5 mg injected weekly.       Return in about 2 months (around 4/23/2024). With PCP    Stephy Oglesby, PharmD, BCACP  Medication Management (MTM) Pharmacist  [facility]       Current Outpatient Medications   Medication    tirzepatide (MOUNJARO) 12.5 MG/0.5ML pen    aspirin 81 MG EC tablet    atorvastatin (LIPITOR) 20 MG tablet    Continuous Blood Gluc Sensor (FREESTYLE KATALINA 3 SENSOR) MISC    DULoxetine (CYMBALTA) 30 MG capsule    DULoxetine (CYMBALTA) 60 MG capsule    empagliflozin (JARDIANCE) 25 MG TABS tablet    lisinopril (ZESTRIL) 5 MG tablet    traZODone (DESYREL) 100 MG tablet     No current facility-administered medications for this visit.

## 2024-02-23 ENCOUNTER — VIRTUAL VISIT (OUTPATIENT)
Dept: PHARMACY | Facility: CLINIC | Age: 44
End: 2024-02-23

## 2024-02-23 DIAGNOSIS — F32.A ANXIETY AND DEPRESSION: ICD-10-CM

## 2024-02-23 DIAGNOSIS — E11.9 TYPE 2 DIABETES MELLITUS WITHOUT COMPLICATION, WITHOUT LONG-TERM CURRENT USE OF INSULIN (H): Primary | ICD-10-CM

## 2024-02-23 DIAGNOSIS — I10 HYPERTENSION, UNSPECIFIED TYPE: ICD-10-CM

## 2024-02-23 DIAGNOSIS — E78.5 HYPERLIPIDEMIA, UNSPECIFIED HYPERLIPIDEMIA TYPE: ICD-10-CM

## 2024-02-23 DIAGNOSIS — F41.9 ANXIETY AND DEPRESSION: ICD-10-CM

## 2024-02-23 PROCEDURE — 99207 PR NO CHARGE LOS: CPT | Mod: 93 | Performed by: PHARMACIST

## 2024-02-23 RX ORDER — TIRZEPATIDE 12.5 MG/.5ML
12.5 INJECTION, SOLUTION SUBCUTANEOUS
Qty: 6 ML | Refills: 3 | Status: SHIPPED | OUTPATIENT
Start: 2024-02-23 | End: 2024-07-02

## 2024-02-23 NOTE — PATIENT INSTRUCTIONS
"Recommendations from today's Medication Management (MTM) visit:                                                      Increase Mounjaro to 12.5 mg injected weekly.       To schedule another MTM appointment, please call the MTM scheduling line at [phone] or toll-free at [phone].     My MTM pharmacist's contact information:      Please feel free to contact me with any questions or concerns you have via [employer] or calling the clinic.       Stephy Oglesby, PharmD, [facility]  Medication Management (MTM) Pharmacist  [facility]     It was great speaking with you today.  I value your experience and would be very thankful for your time in providing feedback in our clinic survey. In the next few days, you may receive an email or text message from [employer] with a link to a survey related to your  clinical pharmacist.\"     "

## 2024-03-27 ENCOUNTER — MYC MEDICAL ADVICE (OUTPATIENT)
Dept: FAMILY MEDICINE | Facility: CLINIC | Age: 44
End: 2024-03-27
Payer: COMMERCIAL

## 2024-03-27 DIAGNOSIS — E11.9 TYPE 2 DIABETES MELLITUS WITHOUT COMPLICATION, WITHOUT LONG-TERM CURRENT USE OF INSULIN (H): Primary | ICD-10-CM

## 2024-03-27 DIAGNOSIS — E66.01 MORBID OBESITY (H): ICD-10-CM

## 2024-03-27 DIAGNOSIS — I10 PRIMARY HYPERTENSION: ICD-10-CM

## 2024-03-27 DIAGNOSIS — E78.5 HYPERLIPIDEMIA, UNSPECIFIED HYPERLIPIDEMIA TYPE: ICD-10-CM

## 2024-03-27 NOTE — TELEPHONE ENCOUNTER
"LOV 1/2/24    \"Type 2 diabetes mellitus without complication, without long-term current use of insulin (H)  Hgb A1c elevated from previous to 6.4.  Continue with increased dose of Mounjaro and Jardiance.  Recheck in 6 months.\"    \"Diabetes:   He presents for follow up of diabetes.  He is checking home blood glucose a few times a month.   He checks blood glucose before and after meals.  Blood glucose is never over 200 and never under 70. He is aware of hypoglycemia symptoms including shakiness, dizziness and blurred vision.   He is concerned about frequent infections and blood sugar frequently over 200.   He is having weight gain.  The patient has not had a diabetic eye exam in the last 12 months.   \"  "

## 2024-06-05 ENCOUNTER — TELEPHONE (OUTPATIENT)
Dept: FAMILY MEDICINE | Facility: CLINIC | Age: 44
End: 2024-06-05
Payer: COMMERCIAL

## 2024-06-05 NOTE — TELEPHONE ENCOUNTER
Patient Returning Call    Reason for call:  Want to someone to call back after 9am     Information relayed to patient:      Patient has additional questions:  Yes    What are your questions/concerns:  when call back    Who does the patient want to speak with:      Is an  needed?:  No      Could we send this information to you in [facility] or would you prefer to receive a phone call?:   Patient would prefer a phone call   Okay to leave a detailed message?: Yes at Cell number on file:    Telephone Information:   Mobile [phone]

## 2024-06-17 ENCOUNTER — TELEPHONE (OUTPATIENT)
Dept: FAMILY MEDICINE | Facility: CLINIC | Age: 44
End: 2024-06-17
Payer: COMMERCIAL

## 2024-06-17 ENCOUNTER — MYC MEDICAL ADVICE (OUTPATIENT)
Dept: FAMILY MEDICINE | Facility: CLINIC | Age: 44
End: 2024-06-17
Payer: COMMERCIAL

## 2024-06-17 DIAGNOSIS — E11.9 TYPE 2 DIABETES MELLITUS WITHOUT COMPLICATION, WITHOUT LONG-TERM CURRENT USE OF INSULIN (H): ICD-10-CM

## 2024-06-17 DIAGNOSIS — E66.01 MORBID OBESITY (H): Primary | ICD-10-CM

## 2024-06-17 DIAGNOSIS — I10 PRIMARY HYPERTENSION: ICD-10-CM

## 2024-06-17 DIAGNOSIS — E78.5 HYPERLIPIDEMIA, UNSPECIFIED HYPERLIPIDEMIA TYPE: ICD-10-CM

## 2024-06-17 NOTE — LETTER
RE: Jose Morillo III  [address] [place] [address]  : 1980  MRN: [id number]        To Whom It May Concern,    I am writing on behalf of my patient, Jose Morillo III, to document the medical necessity of Mounjaro for treatment of Type 2 Diabetes.     He could not tolerate high-dose metformin due to GI side effects, and even at low-dose he was having nausea, dizziness, and headache. He was initially prescribed Metformin XR on 2020.  His dose was decreased to 500 mg on 2020.  He was still experiencing side effects at the low dose, therefore, the Metformin was discontinued.       Sincerely,    Leesa Noland NP

## 2024-06-18 NOTE — TELEPHONE ENCOUNTER
Central Prior Authorization Team  Phone: [phone]    PA Initiation    Medication: MOUNJARO 12.5 MG/0.5ML SC SOPN  Insurance Company: [employer] - Phone [phone] Fax [phone]  Pharmacy [facility]illing the Rx: [id number] [place] [address] AT [place] [address]  Filling Pharmacy Phone: [phone]  Filling Pharmacy Fax:    Start Date: 6/18/2024

## 2024-06-24 ENCOUNTER — OFFICE VISIT (OUTPATIENT)
Dept: FAMILY MEDICINE | Facility: CLINIC | Age: 44
End: 2024-06-24
Payer: COMMERCIAL

## 2024-06-24 ENCOUNTER — TELEPHONE (OUTPATIENT)
Dept: FAMILY MEDICINE | Facility: CLINIC | Age: 44
End: 2024-06-24

## 2024-06-24 VITALS
WEIGHT: 260 LBS | HEART RATE: 100 BPM | OXYGEN SATURATION: 96 % | BODY MASS INDEX: 38.51 KG/M2 | HEIGHT: 69 IN | SYSTOLIC BLOOD PRESSURE: 125 MMHG | TEMPERATURE: 98.5 F | RESPIRATION RATE: 14 BRPM | DIASTOLIC BLOOD PRESSURE: 85 MMHG

## 2024-06-24 DIAGNOSIS — F41.9 ANXIETY AND DEPRESSION: Primary | ICD-10-CM

## 2024-06-24 DIAGNOSIS — F32.A ANXIETY AND DEPRESSION: Primary | ICD-10-CM

## 2024-06-24 PROCEDURE — 99213 OFFICE O/P EST LOW 20 MIN: CPT | Performed by: FAMILY MEDICINE

## 2024-06-24 ASSESSMENT — PATIENT HEALTH QUESTIONNAIRE - PHQ9
10. IF YOU CHECKED OFF ANY PROBLEMS, HOW DIFFICULT HAVE THESE PROBLEMS MADE IT FOR YOU TO DO YOUR WORK, TAKE CARE OF THINGS AT HOME, OR GET ALONG WITH OTHER PEOPLE: SOMEWHAT DIFFICULT
SUM OF ALL RESPONSES TO PHQ QUESTIONS 1-9: 8
SUM OF ALL RESPONSES TO PHQ QUESTIONS 1-9: 8

## 2024-06-24 ASSESSMENT — ANXIETY QUESTIONNAIRES
GAD7 TOTAL SCORE: 16
5. BEING SO RESTLESS THAT IT IS HARD TO SIT STILL: SEVERAL DAYS
7. FEELING AFRAID AS IF SOMETHING AWFUL MIGHT HAPPEN: NEARLY EVERY DAY
IF YOU CHECKED OFF ANY PROBLEMS ON THIS QUESTIONNAIRE, HOW DIFFICULT HAVE THESE PROBLEMS MADE IT FOR YOU TO DO YOUR WORK, TAKE CARE OF THINGS AT HOME, OR GET ALONG WITH OTHER PEOPLE: VERY DIFFICULT
GAD7 TOTAL SCORE: 16
GAD7 TOTAL SCORE: 16
3. WORRYING TOO MUCH ABOUT DIFFERENT THINGS: NEARLY EVERY DAY
4. TROUBLE RELAXING: MORE THAN HALF THE DAYS
1. FEELING NERVOUS, ANXIOUS, OR ON EDGE: MORE THAN HALF THE DAYS
6. BECOMING EASILY ANNOYED OR IRRITABLE: NEARLY EVERY DAY
7. FEELING AFRAID AS IF SOMETHING AWFUL MIGHT HAPPEN: NEARLY EVERY DAY
8. IF YOU CHECKED OFF ANY PROBLEMS, HOW DIFFICULT HAVE THESE MADE IT FOR YOU TO DO YOUR WORK, TAKE CARE OF THINGS AT HOME, OR GET ALONG WITH OTHER PEOPLE?: VERY DIFFICULT
2. NOT BEING ABLE TO STOP OR CONTROL WORRYING: MORE THAN HALF THE DAYS

## 2024-06-24 NOTE — TELEPHONE ENCOUNTER
General Call    Contacts       Contact Date/Time Type Contact Phone/Fax    06/17/2024 05:02 PM CDT Phone (Incoming) [facility] #[id number] [place] [address] AT [place] [address] (Pharmacy) [phone]    06/24/2024 09:52 AM CDT Phone (Incoming) ANTONIA from [facility] [phone]          Reason for Call:  Prior Authorization    What are your questions or concerns:  Insurance company called to advise that we need to initiate PA for the 15 Mg dose of Mounjaro.  TC will initiate PA.     tirzepatide (MOUNJARO) 15 MG/0.5ML pen 6 mL 0 6/18/2024 -- No       Cyril Lange

## 2024-06-24 NOTE — TELEPHONE ENCOUNTER
Central Prior Authorization Team  Phone: [phone]    PA Initiation    Medication: MOUNJARO 15 MG/0.5ML SC SOPN  Insurance Company: [employer] - Phone [phone] Fax [phone]  Pharmacy Filling the Rx: [id number] [place] [address] AT [place] [address]  Filling Pharmacy Phone: [phone]  Filling Pharmacy Fax:    Start Date: 6/24/2024

## 2024-06-24 NOTE — PROGRESS NOTES
"  Assessment & Plan     (F41.9,  F32.A) Anxiety and depression  (primary encounter diagnosis)  Comment: Patient with a longstanding history of predominant mood problems specifically anxiety, he has benefited from cannabis, I think this would also help insomnia and probably other issues as well  Plan: Certification for medical marijuana, of note patient should be seen periodically for recertification.          BMI  Estimated body mass index is 38.4 kg/m  as calculated from the following:    Height as of this encounter: 1.753 m (5' 9\").    Weight as of this encounter: 117.9 kg (260 lb).             Alo Douglas is a 44 year old, presenting for the following health issues:  Recheck Medication (Marijuana discussion for medical use)      6/24/2024    10:39 AM   Additional Questions   Roomed by Misael     History of Present Illness       Mental Health Follow-up:  Patient presents to follow-up on Depression & Anxiety.Patient's depression since last visit has been:  Medium  The patient is having other symptoms associated with depression.  Patient's anxiety since last visit has been:  Bad  The patient is having other symptoms associated with anxiety.  Any significant life events: relationship concerns and job concerns  Patient is feeling anxious or having panic attacks.  Patient has no concerns about alcohol or drug use.    He eats 2-3 servings of fruits and vegetables daily.He consumes 1 sweetened beverage(s) daily.He exercises with enough effort to increase his heart rate 30 to 60 minutes per day.  He exercises with enough effort to increase his heart rate 4 days per week.   He is taking medications regularly.     Patient comes in because he would like to be certified for medical marijuana.  The patient has fairly longstanding history of some underlying anxiety he is on a variety of medications for this, I actually certified him for medical marijuana in the past but he could not afford it at the time but he cannot " "afford it now.    When he has used marijuana in the past he finds that it does help with his anxiety he sleeps better his wife would also agree that he seems to be in a better mood at times he is a bit more irritable.    Patient does not have a history of sleep apnea or chronic pain this would be more for underlying anxiety and to help with sleep.                Objective    /85   Pulse 100   Temp 98.5  F (36.9  C) (Oral)   Resp 14   Ht 1.753 m (5' 9\")   Wt 117.9 kg (260 lb)   SpO2 96%   BMI 38.40 kg/m    Body mass index is 38.4 kg/m .  Physical Exam               Signed Electronically by: Pawel Dixon MD    "

## 2024-06-24 NOTE — TELEPHONE ENCOUNTER
Central Prior Authorization Team  Phone: [phone]    PRIOR AUTHORIZATION DENIED    Medication: MOUNJARO 12.5 MG/0.5ML SC SOPN  Insurance Company: [employer] - Phone [phone] Fax [phone]  Denial Date: 6/24/2024  Denial Reason(s):         Appeal Information:         Patient Notified: no

## 2024-06-25 NOTE — TELEPHONE ENCOUNTER
Central Prior Authorization Team  Phone: [phone]    Medication Appeal Initiation    Medication: MOUNJARO 12.5 MG/0.5ML SC SOPN  Appeal Start Date:  6/25/2024  Insurance Company: [employer] Phone: [phone]  Insurance Fax: [phone]  Comments:   FAXED LMN TO INSURANCE

## 2024-06-28 DIAGNOSIS — E11.9 TYPE 2 DIABETES MELLITUS WITHOUT COMPLICATION, WITHOUT LONG-TERM CURRENT USE OF INSULIN (H): ICD-10-CM

## 2024-06-28 DIAGNOSIS — G47.00 INSOMNIA, UNSPECIFIED TYPE: ICD-10-CM

## 2024-06-28 NOTE — TELEPHONE ENCOUNTER
Central Prior Authorization Team  Phone: [phone]    Prior Authorization Approval    Medication: MOUNJARO 15 MG/0.5ML SC SOPN  Authorization Effective Date: 6/24/2024  Authorization Expiration Date: 6/24/2025  Approved Dose/Quantity:   Reference #:     Insurance Company: [employer] - Phone [phone] Fax [phone]  Expected CoPay: $    CoPay Card Available:      Financial Assistance Needed:   Which [facility] the prescription[id number] [address] [facility] [place] [address]  Pharmacy Notified: YES  Patient Notified: PHARMACY WILL NOTIFY PT WHEN READY

## 2024-07-01 ENCOUNTER — TELEPHONE (OUTPATIENT)
Dept: FAMILY MEDICINE | Facility: CLINIC | Age: 44
End: 2024-07-01
Payer: COMMERCIAL

## 2024-07-01 NOTE — TELEPHONE ENCOUNTER
Marc inquiring why they received 2 requests for Mounjaro    12.5 mg denied & 15 mg approved currently    RN explained that often with the delays in approval and supply with this type of medication we often need to have 2 dosages in play.     One to maintain patients medication and one request out to get ball rolling on approval and seek out supply or patients will have gap in medications.    Adonis understood and was agreeable to the situation.    SHASHI Huerta  [facility]  [phone]    [facility]   Monday  - Thursday 7 AM - 6 PM    Friday  7 AM - 5 PM     -Please call your clinic for assistance from a nurse after hours.

## 2024-07-02 RX ORDER — TRAZODONE HYDROCHLORIDE 100 MG/1
200 TABLET ORAL AT BEDTIME
Qty: 180 TABLET | Refills: 1 | Status: SHIPPED | OUTPATIENT
Start: 2024-07-02

## 2024-07-02 ASSESSMENT — PATIENT HEALTH QUESTIONNAIRE - PHQ9
10. IF YOU CHECKED OFF ANY PROBLEMS, HOW DIFFICULT HAVE THESE PROBLEMS MADE IT FOR YOU TO DO YOUR WORK, TAKE CARE OF THINGS AT HOME, OR GET ALONG WITH OTHER PEOPLE: SOMEWHAT DIFFICULT
SUM OF ALL RESPONSES TO PHQ QUESTIONS 1-9: 9
SUM OF ALL RESPONSES TO PHQ QUESTIONS 1-9: 9

## 2024-07-03 ENCOUNTER — OFFICE VISIT (OUTPATIENT)
Dept: FAMILY MEDICINE | Facility: CLINIC | Age: 44
End: 2024-07-03
Attending: NURSE PRACTITIONER
Payer: COMMERCIAL

## 2024-07-03 ENCOUNTER — TELEPHONE (OUTPATIENT)
Dept: FAMILY MEDICINE | Facility: CLINIC | Age: 44
End: 2024-07-03

## 2024-07-03 VITALS
SYSTOLIC BLOOD PRESSURE: 122 MMHG | HEART RATE: 86 BPM | WEIGHT: 264 LBS | DIASTOLIC BLOOD PRESSURE: 70 MMHG | RESPIRATION RATE: 16 BRPM | BODY MASS INDEX: 39.1 KG/M2 | OXYGEN SATURATION: 95 % | TEMPERATURE: 97.3 F | HEIGHT: 69 IN

## 2024-07-03 DIAGNOSIS — Z12.11 SCREEN FOR COLON CANCER: ICD-10-CM

## 2024-07-03 DIAGNOSIS — Z00.00 ROUTINE GENERAL MEDICAL EXAMINATION AT A HEALTH CARE FACILITY: Primary | ICD-10-CM

## 2024-07-03 DIAGNOSIS — E11.9 TYPE 2 DIABETES MELLITUS WITHOUT COMPLICATION, WITHOUT LONG-TERM CURRENT USE OF INSULIN (H): ICD-10-CM

## 2024-07-03 PROBLEM — E11.65 TYPE 2 DIABETES MELLITUS WITH HYPERGLYCEMIA, WITHOUT LONG-TERM CURRENT USE OF INSULIN (H): Status: RESOLVED | Noted: 2020-01-06 | Resolved: 2024-07-03

## 2024-07-03 LAB
ALBUMIN SERPL BCG-MCNC: 4 G/DL (ref 3.5–5.2)
ALP SERPL-CCNC: 112 U/L (ref 40–150)
ALT SERPL W P-5'-P-CCNC: 24 U/L (ref 0–70)
ANION GAP SERPL CALCULATED.3IONS-SCNC: 7 MMOL/L (ref 7–15)
AST SERPL W P-5'-P-CCNC: 22 U/L (ref 0–45)
BILIRUB SERPL-MCNC: 0.7 MG/DL
BUN SERPL-MCNC: 19.8 MG/DL (ref 6–20)
CALCIUM SERPL-MCNC: 9.2 MG/DL (ref 8.6–10)
CHLORIDE SERPL-SCNC: 104 MMOL/L (ref 98–107)
CREAT SERPL-MCNC: 1.05 MG/DL (ref 0.67–1.17)
DEPRECATED HCO3 PLAS-SCNC: 25 MMOL/L (ref 22–29)
EGFRCR SERPLBLD CKD-EPI 2021: 89 ML/MIN/1.73M2
GLUCOSE SERPL-MCNC: 114 MG/DL (ref 70–99)
HBA1C MFR BLD: 5.8 % (ref 0–5.6)
POTASSIUM SERPL-SCNC: 4.7 MMOL/L (ref 3.4–5.3)
PROT SERPL-MCNC: 6.8 G/DL (ref 6.4–8.3)
SODIUM SERPL-SCNC: 136 MMOL/L (ref 135–145)

## 2024-07-03 PROCEDURE — 83036 HEMOGLOBIN GLYCOSYLATED A1C: CPT | Performed by: NURSE PRACTITIONER

## 2024-07-03 PROCEDURE — 99396 PREV VISIT EST AGE 40-64: CPT | Performed by: NURSE PRACTITIONER

## 2024-07-03 PROCEDURE — 80053 COMPREHEN METABOLIC PANEL: CPT | Performed by: NURSE PRACTITIONER

## 2024-07-03 PROCEDURE — 99213 OFFICE O/P EST LOW 20 MIN: CPT | Mod: 25 | Performed by: NURSE PRACTITIONER

## 2024-07-03 PROCEDURE — 36415 COLL VENOUS BLD VENIPUNCTURE: CPT | Performed by: NURSE PRACTITIONER

## 2024-07-03 RX ORDER — BLOOD-GLUCOSE SENSOR
1 EACH MISCELLANEOUS
Qty: 6 EACH | Refills: 3 | Status: SHIPPED | OUTPATIENT
Start: 2024-07-03

## 2024-07-03 SDOH — HEALTH STABILITY: PHYSICAL HEALTH: ON AVERAGE, HOW MANY MINUTES DO YOU ENGAGE IN EXERCISE AT THIS LEVEL?: 50 MIN

## 2024-07-03 SDOH — HEALTH STABILITY: PHYSICAL HEALTH: ON AVERAGE, HOW MANY DAYS PER WEEK DO YOU ENGAGE IN MODERATE TO STRENUOUS EXERCISE (LIKE A BRISK WALK)?: 5 DAYS

## 2024-07-03 ASSESSMENT — SOCIAL DETERMINANTS OF HEALTH (SDOH): HOW OFTEN DO YOU GET TOGETHER WITH FRIENDS OR RELATIVES?: ONCE A WEEK

## 2024-07-03 NOTE — PROGRESS NOTES
Preventive Care Visit  [facility]  Leesa Noland NP, Family Medicine  Jul 3, 2024      Assessment & Plan     Routine general medical examination at a health care facility    Type 2 diabetes mellitus without complication, without long-term current use of insulin (H)  Well controlled with Hgb A1c of 5.8.  Continue Mounjaro and Jardiance at current doses. Follow up in 6 months. Encouraged patient to schedule an eye exam.  - Hemoglobin A1c  - Comprehensive metabolic panel (BMP + Alb, Alk Phos, ALT, AST, Total. Bili, TP)  - Continuous Glucose Sensor (FREESTYLE SPEEDY 3 SENSOR) MISC  Dispense: 6 each; Refill: 3    Screen for colon cancer  - Colonoscopy Screening  Referral      Patient has been advised of split billing requirements and indicates understanding: Yes        Counseling  Appropriate preventive services were discussed with this patient, including applicable screening as appropriate for fall prevention, nutrition, physical activity, Tobacco-use cessation, weight loss and cognition.  Checklist reviewing preventive services available has been given to the patient.  Reviewed patient's diet, addressing concerns and/or questions.   The patient's PHQ-9 score is consistent with mild depression. He was provided with information regarding depression.         Alo Douglas is a 44 year old, presenting for the following:  Follow Up (DM2)    Patient recently got a promotion at work.    On Mounjaro 15 mg and Jardiance for treatment of type 2 diabetes.  This has been well-controlled over the last couple of years.  States he has new insurance and needs a prior authorization for his Jardiance.  Not currently checking blood sugars.  Previously used a CGM, which he found beneficial.  He is overdue for an eye exam.    Denies any questions or concerns today.    Health Care Directive  Patient does not have a Health Care Directive or Living Will: Discussed advance care planning with patient;  however, patient declined at this time.            7/3/2024   General Health   How would you rate your overall physical health? Excellent   Feel stress (tense, anxious, or unable to sleep) To some extent      (!) STRESS CONCERN      7/3/2024   Nutrition   Three or more servings of calcium each day? (!) NO   Diet: Regular (no restrictions)   How many servings of fruit and vegetables per day? (!) 2-3   How many sweetened beverages each day? 0-1            7/3/2024   Exercise   Days per week of moderate/strenous exercise 5 days   Average minutes spent exercising at this level 50 min            7/3/2024   Social Factors   Frequency of gathering with friends or relatives Once a week   Worry food won't last until get money to buy more No   Food not last or not have enough money for food? No   Do you have housing? (Housing is defined as stable permanent housing and does not include staying ouside in a car, in a tent, in an abandoned building, in an overnight shelter, or couch-surfing.) Yes   Are you worried about losing your housing? No   Lack of transportation? No   Unable to get utilities (heat,electricity)? No            7/3/2024   Dental   Dentist two times every year? Yes            7/3/2024   TB Screening   Were you born outside of the [place]? No          Today's PHQ-9 Score:       7/2/2024     6:48 PM   PHQ-9 SCORE   PHQ-9 Total Score MyChart 9 (Mild depression)   PHQ-9 Total Score 9         7/3/2024   Substance Use   Alcohol more than 3/day or more than 7/wk No   Do you use any other substances recreationally? No        Social History     Tobacco Use    Smoking status: Never     Passive exposure: Never    Smokeless tobacco: Never   Vaping Use    Vaping status: Never Used   Substance Use Topics    Alcohol use: No    Drug use: No             7/3/2024   One time HIV Screening   Previous HIV test? No          7/3/2024   STI Screening   New sexual partner(s) since last STI/HIV test? No      ASCVD Risk   The 10-year  "ASCVD risk score (Milton LAL, et al., 2019) is: 2.7%    Values used to calculate the score:      Age: 44 years      Sex: Male      Is Non- : No      Diabetic: Yes      Tobacco smoker: No      Systolic Blood Pressure: 122 mmHg      Is BP treated: Yes      HDL Cholesterol: 48 mg/dL      Total Cholesterol: 150 mg/dL        7/3/2024   Contraception/Family Planning   Questions about contraception or family planning No           Reviewed and updated as needed this visit by Provider   Tobacco  Allergies  Meds  Problems  Med Hx  Surg Hx  Fam Hx                 Objective    Exam  /70 (BP Location: Right arm, Patient Position: Sitting, Cuff Size: Adult Regular)   Pulse 86   Temp 97.3  F (36.3  C) (Temporal)   Resp 16   Ht 1.753 m (5' 9\")   Wt 119.7 kg (264 lb)   SpO2 95%   BMI 38.99 kg/m     Estimated body mass index is 38.99 kg/m  as calculated from the following:    Height as of this encounter: 1.753 m (5' 9\").    Weight as of this encounter: 119.7 kg (264 lb).    Physical Exam  GENERAL: alert and no distress  EYES: Eyes grossly normal to inspection, PERRL and conjunctivae and sclerae normal  HENT: ear canals and TM's normal, nose and mouth without ulcers or lesions  NECK: no adenopathy, no asymmetry, masses, or scars  RESP: lungs clear to auscultation - no rales, rhonchi or wheezes  CV: regular rate and rhythm, normal S1 S2, no S3 or S4, no murmur, click or rub, no peripheral edema  ABDOMEN: soft, nontender, no hepatosplenomegaly, no masses and bowel sounds normal  MS: no gross musculoskeletal defects noted, no edema  SKIN: no suspicious lesions or rashes  NEURO: Normal strength and tone, mentation intact and speech normal  PSYCH: mentation appears normal, affect normal/bright        Signed Electronically by: Leesa Noland NP    "

## 2024-07-03 NOTE — PATIENT INSTRUCTIONS
"Patient Education   Preventive Care Advice   This is general advice we often give to help people stay healthy. Your care team may have specific advice just for you. Please talk to your care team about your own preventive care needs.  Lifestyle  Exercise at least 150 minutes each week (30 minutes a day, 5 days a week).  Do muscle strengthening activities 2 days a week. These help control your weight and prevent disease.  No smoking.  Wear sunscreen to prevent skin cancer.  Have your home tested for radon every 2 to 5 years. Radon is a colorless, odorless gas that can harm your lungs. To learn more, go to www.health.[facility].[place].us and search for \"Radon in Homes.\"  Keep guns unloaded and locked up in a safe place like a safe or gun vault, or, use a gun lock and hide the keys. Always lock away bullets separately. To learn more, visit [employer].[place].gov and search for \"safe gun storage.\"  Nutrition  Eat 5 or more servings of fruits and vegetables each day.  Try wheat bread, brown rice and whole grain pasta (instead of white bread, rice, and pasta).  Get enough calcium and vitamin D. Check the label on foods and aim for 100% of the RDA (recommended daily allowance).  Regular exams  Have a dental exam and cleaning every 6 months.  See your health care team every year to talk about:  Any changes in your health.  Any medicines your care team has prescribed.  Preventive care, family planning, and ways to prevent chronic diseases.  Shots (vaccines)   HPV shots (up to age 26), if you've never had them before.  Hepatitis B shots (up to age 59), if you've never had them before.  COVID-19 shot: Get this shot when it's due.  Flu shot: Get a flu shot every year.  Tetanus shot: Get a tetanus shot every 10 years.  Pneumococcal, hepatitis A, and RSV shots: Ask your care team if you need these based on your risk.  Shingles shot (for age 50 and up).  General health tests  Diabetes screening:  Starting at age 35, Get screened for diabetes at least " every 3 years.  If you are younger than age 35, ask your care team if you should be screened for diabetes.  Cholesterol test: At age 39, start having a cholesterol test every 5 years, or more often if advised.  Bone density scan (DEXA): At age 50, ask your care team if you should have this scan for osteoporosis (brittle bones).  Hepatitis C: Get tested at least once in your life.  Abdominal aortic aneurysm screening: Talk to your doctor about having this screening if you:  Have ever smoked; and  Are biologically male; and  Are between the ages of 65 and 75.  STIs (sexually transmitted infections)  Before age 24: Ask your care team if you should be screened for STIs.  After age 24: Get screened for STIs if you're at risk. You are at risk for STIs (including HIV) if:  You are sexually active with more than one person.  You don't use condoms every time.  You or a partner was diagnosed with a sexually transmitted infection.  If you are at risk for HIV, ask about PrEP medicine to prevent HIV.  Get tested for HIV at least once in your life, whether you are at risk for HIV or not.  Cancer screening tests  Cervical cancer screening: If you have a cervix, begin getting regular cervical cancer screening tests at age 21. Most people who have regular screenings with normal results can stop after age 65. Talk about this with your provider.  Breast cancer scan (mammogram): If you've ever had breasts, begin having regular mammograms starting at age 40. This is a scan to check for breast cancer.  Colon cancer screening: It is important to start screening for colon cancer at age 45.  Have a colonoscopy test every 10 years (or more often if you're at risk) Or, ask your provider about stool tests like a FIT test every year or Cologuard test every 3 years.  To learn more about your testing options, visit: www.[employer]/123917.pdf.  For help making a decision, visit: belgica/qc23208.  Prostate cancer screening test: If you have a  prostate and are age 55 to 69, ask your provider if you would benefit from a yearly prostate cancer screening test.  Lung cancer screening: If you are a current or former smoker age 50 to 80, ask your care team if ongoing lung cancer screenings are right for you.  For informational purposes only. Not to replace the advice of your health care provider. Copyright   2023 [facility]. All rights reserved. Clinically reviewed by the [facility] Transitions Program. [employer] 942159 - REV 04/24.  Learning About Depression Screening  What is depression screening?  Depression screening is a way to see if you have depression symptoms. It may be done by a doctor or counselor. It's often part of a routine checkup. That's because your mental health is just as important as your physical health.  Depression is a mental health condition that affects how you feel, think, and act. You may:  Have less energy.  Lose interest in your daily activities.  Feel sad and grouchy for a long time.  Depression is very common. It affects people of all ages.  Many things can lead to depression. Some people become depressed after they have a stroke or find out they have a major illness like cancer or heart disease. The death of a loved one or a breakup may lead to depression. It can run in families. Most experts believe that a combination of inherited genes and stressful life events can cause it.  What happens during screening?  You may be asked to fill out a form about your depression symptoms. You and the doctor will discuss your answers. The doctor may ask you more questions to learn more about how you think, act, and feel.  What happens after screening?  If you have symptoms of depression, your doctor will talk to you about your options.  Doctors usually treat depression with medicines or counseling. Often, combining the two works best. Many people don't get help because they think that they'll get over the depression on  "their own. But people with depression may not get better unless they get treatment.  The cause of depression is not well understood. There may be many factors involved. But if you have depression, it's not your fault.  A serious symptom of depression is thinking about death or suicide. If you or someone you care about talks about this or about feeling hopeless, get help right away.  It's important to know that depression can be treated. Medicine, counseling, and self-care may help.  Where can you learn more?  Go to https://www.[employer].net/patiented  Enter T185 in the search box to learn more about \"Learning About Depression Screening.\"  Current as of: June 24, 2023               Content Version: 14.0    1781-6875 "[employer].   Care instructions adapted under license by your healthcare professional. If you have questions about a medical condition or this instruction, always ask your healthcare professional. [employer] disclaims any warranty or liability for your use of this information.         "

## 2024-07-03 NOTE — TELEPHONE ENCOUNTER
Retail Pharmacy Prior Authorization Team   Phone: [phone]    PA Initiation    Medication: JARDIANCE 25 MG PO TABS  Insurance Company: [email or web address] - Phone [phone] Fax [phone]  Pharmacy Filling the Rx: [facility] #[id number] [place] [address] AT [place] [address]  Filling Pharmacy Phone: [phone]  Filling Pharmacy Fax:    Start Date: 7/3/2024

## 2024-07-03 NOTE — TELEPHONE ENCOUNTER
Prior Authorization Approval    Authorization Effective Date: 7/3/2024  Authorization Expiration Date: 12/31/2099  Medication: Jardiance-APPROVED  Reference #:     Insurance Company: [employer] - Phone [phone] Fax [phone]  Which Pharmacy is filling the prescription (Not needed for infusion/clinic administered): [facility] #[id number] [address] AT [place] [address]  Pharmacy Notified: Yes  Patient Notified: Instructed pharmacy to notify patient when script is ready to /ship.

## 2024-07-08 ENCOUNTER — TELEPHONE (OUTPATIENT)
Dept: FAMILY MEDICINE | Facility: CLINIC | Age: 44
End: 2024-07-08
Payer: COMMERCIAL

## 2024-07-08 NOTE — TELEPHONE ENCOUNTER
Phillip called wondering if this patient still needed his 12.5 mg Mounajaro PA appealed. I told her that medication was discontinued on 7/2/2024 and that Mounjaro 15 mg/0.5mL was prescribed.    SHASHI Mendes

## 2024-07-26 ENCOUNTER — E-VISIT (OUTPATIENT)
Dept: FAMILY MEDICINE | Facility: CLINIC | Age: 44
End: 2024-07-26
Payer: COMMERCIAL

## 2024-07-26 ENCOUNTER — OFFICE VISIT (OUTPATIENT)
Dept: FAMILY MEDICINE | Facility: CLINIC | Age: 44
End: 2024-07-26
Payer: COMMERCIAL

## 2024-07-26 VITALS
DIASTOLIC BLOOD PRESSURE: 77 MMHG | TEMPERATURE: 98 F | SYSTOLIC BLOOD PRESSURE: 116 MMHG | OXYGEN SATURATION: 96 % | HEART RATE: 80 BPM

## 2024-07-26 DIAGNOSIS — R11.10 POST-TUSSIVE EMESIS: Primary | ICD-10-CM

## 2024-07-26 DIAGNOSIS — R05.1 ACUTE COUGH: Primary | ICD-10-CM

## 2024-07-26 DIAGNOSIS — R06.02 SOB (SHORTNESS OF BREATH): ICD-10-CM

## 2024-07-26 DIAGNOSIS — R05.1 ACUTE COUGH: ICD-10-CM

## 2024-07-26 PROCEDURE — 99213 OFFICE O/P EST LOW 20 MIN: CPT | Performed by: PHYSICIAN ASSISTANT

## 2024-07-26 PROCEDURE — 87635 SARS-COV-2 COVID-19 AMP PRB: CPT | Performed by: PHYSICIAN ASSISTANT

## 2024-07-26 PROCEDURE — 99207 PR NO CHARGE LOS: CPT | Performed by: NURSE PRACTITIONER

## 2024-07-26 PROCEDURE — 87798 DETECT AGENT NOS DNA AMP: CPT | Performed by: PHYSICIAN ASSISTANT

## 2024-07-26 RX ORDER — BENZONATATE 200 MG/1
200 CAPSULE ORAL 3 TIMES DAILY PRN
Qty: 30 CAPSULE | Refills: 0 | Status: SHIPPED | OUTPATIENT
Start: 2024-07-26

## 2024-07-26 NOTE — PROGRESS NOTES
Assessment & Plan     Post-tussive emesis  Pt has had cough with post tussive emesis over the last 1 week but infrequent episodes that are quite severe occurring about 2-3 per day. He is otherwise feeling quite well with no uri sx, fevers, sob, chest pain or malaise.  Will test for covid 19 and pertussis. Will isolate awaiting pertussis results. If negative, recommend conservative measures including fluids, rest and ibuprofen or tylenol. May use tessalon for cough. Hydration, humidification, tea and honey for sx improvement.  No CXR indicated at this time given no fevers, normal vitals and no tachypnea, no chest pain.  If sx persist 3 weeks or worsening should reassess.   - B. pertussis/parapertussis PCR-NP  - benzonatate (TESSALON) 200 MG capsule  Dispense: 30 capsule; Refill: 0  - Symptomatic COVID-19 Virus (Coronavirus) by PCR Nose    Acute cough  As above            Return in about 3 weeks (around 8/16/2024). If persistent.      [facility]    Alo Douglas is a 44 year old male who presents to clinic today for the following health issues:  Chief Complaint   Patient presents with    Urgent Care     - 1 Week  - Severe Coughing episodes  - Comes and goes  - Cough so much til the points he vomits        HPI  Pt presents to urgent care with 1 week hx of cough. He has no associated uri sx or fever. No sob, no chest pain at rest.  He does have sob with coughing spells. The coughing spells occur about 2-3 x per day.  Always in the morning when brushing teeth.  Another 1-2 x later in the day.  He has cough to emesis, spells last a few minutes. He feels lightheaded like he could pass out if he coughs too much.  Between spells no cough, sob, chest pain, wheezing.  No allergy sx. No vomiting or GERD.  He otherwise feels quite well.  UTD on TD immunization, TDAP x 1 as an adult in 2008.  Is able to box aggressively without feeling sob, chest pain, wheezing or  coughing since he has had this problem. He does not find exertion provokes it or makes it worse.  Is able to sleep without difficulty.        Review of Systems  Constitutional, HEENT, cardiovascular, pulmonary, gi and gu systems are negative, except as otherwise noted.      Objective    /77   Pulse 80   Temp 98  F (36.7  C) (Oral)   SpO2 96%   Physical Exam   Pt is in no acute distress and appears well  Ears patent B:  TM s intact, non-injected. All land marks easily visibile    Nasal mucosa is non-edematous, no discharge.    Pharynx: non erythematous, tonsils non hypertrophied, No exudate   Neck supple: no adenopathy  Lungs: CTA  Heart: RRR, no murmur, no thrills or heaves   Ext: no edema  Skin: no rashes    No results found for any visits on 07/26/24.

## 2024-07-26 NOTE — PATIENT INSTRUCTIONS
Dear Jose Morillo III,    We are sorry you are not feeling well. Based on the responses you provided, it is recommended that you be seen in-person in urgent care so we can better evaluate your symptoms. Please click here to find the nearest urgent care location to you.   You will not be charged for this Visit. Thank you for trusting us with your care.    Leesa Noland NP

## 2024-07-27 LAB
B PARAPERT DNA SPEC QL NAA+PROBE: NOT DETECTED
B PERT DNA SPEC QL NAA+PROBE: NOT DETECTED
SARS-COV-2 RNA RESP QL NAA+PROBE: NEGATIVE

## 2024-09-17 ENCOUNTER — MYC MEDICAL ADVICE (OUTPATIENT)
Dept: FAMILY MEDICINE | Facility: CLINIC | Age: 44
End: 2024-09-17
Payer: COMMERCIAL

## 2024-09-17 DIAGNOSIS — E78.5 HYPERLIPIDEMIA, UNSPECIFIED HYPERLIPIDEMIA TYPE: ICD-10-CM

## 2024-09-17 DIAGNOSIS — E11.9 TYPE 2 DIABETES MELLITUS WITHOUT COMPLICATION, WITHOUT LONG-TERM CURRENT USE OF INSULIN (H): ICD-10-CM

## 2024-09-17 DIAGNOSIS — E66.01 MORBID OBESITY (H): ICD-10-CM

## 2024-09-17 DIAGNOSIS — I10 PRIMARY HYPERTENSION: ICD-10-CM

## 2024-12-05 ENCOUNTER — MYC MEDICAL ADVICE (OUTPATIENT)
Dept: FAMILY MEDICINE | Facility: CLINIC | Age: 44
End: 2024-12-05
Payer: COMMERCIAL

## 2024-12-05 DIAGNOSIS — E78.5 HYPERLIPIDEMIA, UNSPECIFIED HYPERLIPIDEMIA TYPE: ICD-10-CM

## 2024-12-05 DIAGNOSIS — I10 ESSENTIAL HYPERTENSION: ICD-10-CM

## 2024-12-05 DIAGNOSIS — E66.01 MORBID OBESITY (H): ICD-10-CM

## 2024-12-05 DIAGNOSIS — I10 PRIMARY HYPERTENSION: ICD-10-CM

## 2024-12-05 DIAGNOSIS — E11.9 TYPE 2 DIABETES MELLITUS WITHOUT COMPLICATION, WITHOUT LONG-TERM CURRENT USE OF INSULIN (H): ICD-10-CM

## 2024-12-05 RX ORDER — LISINOPRIL 5 MG/1
5 TABLET ORAL DAILY
Qty: 90 TABLET | Refills: 1 | Status: SHIPPED | OUTPATIENT
Start: 2024-12-05

## 2025-01-07 ENCOUNTER — TELEPHONE (OUTPATIENT)
Dept: FAMILY MEDICINE | Facility: CLINIC | Age: 45
End: 2025-01-07
Payer: COMMERCIAL

## 2025-01-07 NOTE — TELEPHONE ENCOUNTER
Retail Pharmacy Prior Authorization Team   Phone: [phone]    [facility] LOMELI: [id number]

## 2025-01-08 ENCOUNTER — MYC MEDICAL ADVICE (OUTPATIENT)
Dept: FAMILY MEDICINE | Facility: CLINIC | Age: 45
End: 2025-01-08
Payer: COMMERCIAL

## 2025-01-08 DIAGNOSIS — I10 PRIMARY HYPERTENSION: ICD-10-CM

## 2025-01-08 DIAGNOSIS — E66.01 MORBID OBESITY (H): ICD-10-CM

## 2025-01-08 DIAGNOSIS — E11.9 TYPE 2 DIABETES MELLITUS WITHOUT COMPLICATION, WITHOUT LONG-TERM CURRENT USE OF INSULIN (H): ICD-10-CM

## 2025-01-08 DIAGNOSIS — E78.5 HYPERLIPIDEMIA, UNSPECIFIED HYPERLIPIDEMIA TYPE: ICD-10-CM

## 2025-01-08 DIAGNOSIS — F32.A ANXIETY AND DEPRESSION: ICD-10-CM

## 2025-01-08 DIAGNOSIS — F41.9 ANXIETY AND DEPRESSION: ICD-10-CM

## 2025-01-08 RX ORDER — DULOXETIN HYDROCHLORIDE 30 MG/1
CAPSULE, DELAYED RELEASE ORAL
Qty: 90 CAPSULE | Refills: 1 | Status: SHIPPED | OUTPATIENT
Start: 2025-01-08

## 2025-01-08 RX ORDER — ATORVASTATIN CALCIUM 20 MG/1
20 TABLET, FILM COATED ORAL AT BEDTIME
Qty: 90 TABLET | Refills: 1 | Status: SHIPPED | OUTPATIENT
Start: 2025-01-08

## 2025-01-08 RX ORDER — DULOXETIN HYDROCHLORIDE 60 MG/1
CAPSULE, DELAYED RELEASE ORAL
Qty: 90 CAPSULE | Refills: 1 | Status: SHIPPED | OUTPATIENT
Start: 2025-01-08

## 2025-01-08 NOTE — TELEPHONE ENCOUNTER
PA Initiation    Medication: FREESTYLE SPEEDY 3 SENSOR [facility] [place] [facility]  Insurance Company: [employer] - Phone [phone] Fax [phone]  Pharmacy Filling the Rx: [id number] [place] [address] AT [place] [address]  Filling Pharmacy Phone: [phone]  Filling Pharmacy Fax: [phone]  Start Date: 1/8/2025

## 2025-01-12 ENCOUNTER — HEALTH MAINTENANCE LETTER (OUTPATIENT)
Age: 45
End: 2025-01-12

## 2025-01-13 NOTE — TELEPHONE ENCOUNTER
Please call patient.    Insurance denied his PA for the Freestyle Katalina 3 continuous blood sugar monitor.    Leesa Noland NP

## 2025-01-15 NOTE — TELEPHONE ENCOUNTER
Patient Returning Call    Reason for call:  return call    Information relayed to patient:  yes, relayed message regarding PA below.    Patient has additional questions:  No      Could we [facility] this [facility] to you [facility] or would you prefer to receive a phone call?:   n/a

## 2025-01-29 ENCOUNTER — OFFICE VISIT (OUTPATIENT)
Dept: FAMILY MEDICINE | Facility: CLINIC | Age: 45
End: 2025-01-29
Payer: COMMERCIAL

## 2025-01-29 VITALS
DIASTOLIC BLOOD PRESSURE: 60 MMHG | RESPIRATION RATE: 16 BRPM | OXYGEN SATURATION: 97 % | SYSTOLIC BLOOD PRESSURE: 120 MMHG | HEIGHT: 68 IN | TEMPERATURE: 97.5 F | WEIGHT: 237 LBS | BODY MASS INDEX: 35.92 KG/M2 | HEART RATE: 72 BPM

## 2025-01-29 DIAGNOSIS — E11.9 TYPE 2 DIABETES MELLITUS WITHOUT COMPLICATION, WITHOUT LONG-TERM CURRENT USE OF INSULIN (H): Primary | ICD-10-CM

## 2025-01-29 DIAGNOSIS — F32.A ANXIETY AND DEPRESSION: ICD-10-CM

## 2025-01-29 DIAGNOSIS — E78.5 HYPERLIPIDEMIA, UNSPECIFIED HYPERLIPIDEMIA TYPE: ICD-10-CM

## 2025-01-29 DIAGNOSIS — F41.9 ANXIETY AND DEPRESSION: ICD-10-CM

## 2025-01-29 DIAGNOSIS — I10 ESSENTIAL HYPERTENSION: ICD-10-CM

## 2025-01-29 LAB
CHOLEST SERPL-MCNC: 124 MG/DL
CREAT UR-MCNC: 189 MG/DL
EST. AVERAGE GLUCOSE BLD GHB EST-MCNC: 108 MG/DL
FASTING STATUS PATIENT QL REPORTED: YES
HBA1C MFR BLD: 5.4 % (ref 0–5.6)
HDLC SERPL-MCNC: 42 MG/DL
LDLC SERPL CALC-MCNC: 61 MG/DL
MICROALBUMIN UR-MCNC: <12 MG/L
MICROALBUMIN/CREAT UR: NORMAL MG/G{CREAT}
NONHDLC SERPL-MCNC: 82 MG/DL
TRIGL SERPL-MCNC: 106 MG/DL

## 2025-01-29 PROCEDURE — G2211 COMPLEX E/M VISIT ADD ON: HCPCS | Performed by: NURSE PRACTITIONER

## 2025-01-29 PROCEDURE — 82043 UR ALBUMIN QUANTITATIVE: CPT | Performed by: NURSE PRACTITIONER

## 2025-01-29 PROCEDURE — 82570 ASSAY OF URINE CREATININE: CPT | Performed by: NURSE PRACTITIONER

## 2025-01-29 PROCEDURE — 99207 PR FOOT EXAM NO CHARGE: CPT | Performed by: NURSE PRACTITIONER

## 2025-01-29 PROCEDURE — 83036 HEMOGLOBIN GLYCOSYLATED A1C: CPT | Performed by: NURSE PRACTITIONER

## 2025-01-29 PROCEDURE — 80061 LIPID PANEL: CPT | Performed by: NURSE PRACTITIONER

## 2025-01-29 PROCEDURE — 36415 COLL VENOUS BLD VENIPUNCTURE: CPT | Performed by: NURSE PRACTITIONER

## 2025-01-29 PROCEDURE — 99214 OFFICE O/P EST MOD 30 MIN: CPT | Performed by: NURSE PRACTITIONER

## 2025-01-29 RX ORDER — LANCETS
EACH MISCELLANEOUS
Qty: 100 EACH | Refills: 6 | Status: SHIPPED | OUTPATIENT
Start: 2025-01-29

## 2025-01-29 NOTE — PROGRESS NOTES
"  Assessment & Plan     Type 2 diabetes mellitus without complication, without long-term current use of insulin (H)  ***  - Albumin Random Urine Quantitative with Creat Ratio  - Hemoglobin A1c  - FOOT EXAM  - blood glucose monitoring (NO BRAND SPECIFIED) meter device kit  Dispense: 1 kit; Refill: 0  - blood glucose (NO BRAND SPECIFIED) test strip  Dispense: 100 strip; Refill: 6  - thin (NO BRAND SPECIFIED) lancets  Dispense: 100 each; Refill: 6  - Albumin Random Urine Quantitative with Creat Ratio  - Hemoglobin A1c    Hyperlipidemia, unspecified hyperlipidemia type  ***  - Lipid panel reflex to direct LDL Non-fasting  - Lipid panel reflex to direct LDL Non-fasting    Anxiety and depression  ***    Essential hypertension  ***      The longitudinal plan of care for the diagnosis(es)/condition(s) as documented were addressed during this visit. Due to the added complexity in care, I will continue to support Misael in the subsequent management and with ongoing continuity of care.        BMI  Estimated body mass index is 36.04 kg/m  as calculated from the following:    Height as of this encounter: 1.727 m (5' 8\").    Weight as of this encounter: 107.5 kg (237 lb).         Subjective   Misael is a 44 year old, presenting for the following health issues:  Follow Up (DM2 ; refills Lipitor, Tirzepatide and duloxetine )        Objective    /60 (BP Location: Right arm, Patient Position: Sitting, Cuff Size: Adult Regular)   Pulse 72   Temp 97.5  F (36.4  C) (Temporal)   Resp 16   Ht 1.727 m (5' 8\")   Wt 107.5 kg (237 lb)   SpO2 97%   BMI 36.04 kg/m    Body mass index is 36.04 kg/m .  Physical Exam   GENERAL: alert and no distress  RESP: lungs clear to auscultation - no rales, rhonchi or wheezes  CV: regular rate and rhythm, normal S1 S2, no S3 or S4, no murmur, click or rub, no peripheral edema   ABDOMEN: soft, nontender, no hepatosplenomegaly, no masses and bowel sounds normal  PSYCH: mentation appears normal, affect " normal/bright  Diabetic foot exam: normal DP and PT pulses, no trophic changes or ulcerative lesions, and normal sensory exam            Signed Electronically by: Leesa Noland NP  {Email feedback regarding this note to [email or web address]   :[id number]}

## 2025-04-09 DIAGNOSIS — G47.00 INSOMNIA, UNSPECIFIED TYPE: ICD-10-CM

## 2025-04-09 RX ORDER — TRAZODONE HYDROCHLORIDE 100 MG/1
200 TABLET ORAL AT BEDTIME
Qty: 180 TABLET | Refills: 1 | Status: SHIPPED | OUTPATIENT
Start: 2025-04-09

## 2025-05-09 ENCOUNTER — OFFICE VISIT (OUTPATIENT)
Dept: FAMILY MEDICINE | Facility: CLINIC | Age: 45
End: 2025-05-09
Payer: COMMERCIAL

## 2025-05-09 ENCOUNTER — RESULTS FOLLOW-UP (OUTPATIENT)
Dept: FAMILY MEDICINE | Facility: CLINIC | Age: 45
End: 2025-05-09

## 2025-05-09 VITALS
OXYGEN SATURATION: 98 % | DIASTOLIC BLOOD PRESSURE: 60 MMHG | BODY MASS INDEX: 37.86 KG/M2 | HEART RATE: 86 BPM | SYSTOLIC BLOOD PRESSURE: 110 MMHG | TEMPERATURE: 97.4 F | WEIGHT: 249 LBS | RESPIRATION RATE: 14 BRPM

## 2025-05-09 DIAGNOSIS — L98.7 EXCESS SKIN OF ABDOMEN: ICD-10-CM

## 2025-05-09 DIAGNOSIS — Z13.6 SCREENING FOR CARDIOVASCULAR CONDITION: Primary | ICD-10-CM

## 2025-05-09 LAB
ALBUMIN UR-MCNC: NEGATIVE MG/DL
APPEARANCE UR: CLEAR
BACTERIA #/AREA URNS HPF: ABNORMAL /HPF
BILIRUB UR QL STRIP: NEGATIVE
COLOR UR AUTO: YELLOW
ERYTHROCYTE [DISTWIDTH] IN BLOOD BY AUTOMATED COUNT: 12.4 % (ref 10–15)
GLUCOSE UR STRIP-MCNC: NEGATIVE MG/DL
HCT VFR BLD AUTO: 45.6 % (ref 40–53)
HGB BLD-MCNC: 15.7 G/DL (ref 13.3–17.7)
HGB UR QL STRIP: NEGATIVE
KETONES UR STRIP-MCNC: NEGATIVE MG/DL
LEUKOCYTE ESTERASE UR QL STRIP: NEGATIVE
MCH RBC QN AUTO: 28.7 PG (ref 26.5–33)
MCHC RBC AUTO-ENTMCNC: 34.4 G/DL (ref 31.5–36.5)
MCV RBC AUTO: 83 FL (ref 78–100)
MUCOUS THREADS #/AREA URNS LPF: ABNORMAL /LPF
NITRATE UR QL: NEGATIVE
PH UR STRIP: 7 [PH] (ref 5–8)
PLATELET # BLD AUTO: 237 10E3/UL (ref 150–450)
RBC # BLD AUTO: 5.47 10E6/UL (ref 4.4–5.9)
RBC #/AREA URNS AUTO: ABNORMAL /HPF
SP GR UR STRIP: 1.02 (ref 1–1.03)
SQUAMOUS #/AREA URNS AUTO: ABNORMAL /LPF
UROBILINOGEN UR STRIP-ACNC: 1 E.U./DL
WBC # BLD AUTO: 8.3 10E3/UL (ref 4–11)
WBC #/AREA URNS AUTO: ABNORMAL /HPF
WBC CLUMPS #/AREA URNS HPF: ABNORMAL /HPF

## 2025-05-09 PROCEDURE — 36415 COLL VENOUS BLD VENIPUNCTURE: CPT | Performed by: NURSE PRACTITIONER

## 2025-05-09 PROCEDURE — 99213 OFFICE O/P EST LOW 20 MIN: CPT | Performed by: NURSE PRACTITIONER

## 2025-05-09 PROCEDURE — 93010 ELECTROCARDIOGRAM REPORT: CPT | Performed by: INTERNAL MEDICINE

## 2025-05-09 PROCEDURE — 3074F SYST BP LT 130 MM HG: CPT | Performed by: NURSE PRACTITIONER

## 2025-05-09 PROCEDURE — 85027 COMPLETE CBC AUTOMATED: CPT | Performed by: NURSE PRACTITIONER

## 2025-05-09 PROCEDURE — 81001 URINALYSIS AUTO W/SCOPE: CPT | Performed by: NURSE PRACTITIONER

## 2025-05-09 PROCEDURE — 3078F DIAST BP <80 MM HG: CPT | Performed by: NURSE PRACTITIONER

## 2025-05-09 PROCEDURE — 93005 ELECTROCARDIOGRAM TRACING: CPT | Performed by: NURSE PRACTITIONER

## 2025-05-09 ASSESSMENT — PATIENT HEALTH QUESTIONNAIRE - PHQ9
SUM OF ALL RESPONSES TO PHQ QUESTIONS 1-9: 10
SUM OF ALL RESPONSES TO PHQ QUESTIONS 1-9: 10
10. IF YOU CHECKED OFF ANY PROBLEMS, HOW DIFFICULT HAVE THESE PROBLEMS MADE IT FOR YOU TO DO YOUR WORK, TAKE CARE OF THINGS AT HOME, OR GET ALONG WITH OTHER PEOPLE: SOMEWHAT DIFFICULT

## 2025-05-09 NOTE — PROGRESS NOTES
"  Assessment & Plan     Screening for cardiovascular condition  Requesting EKG, stress testing, CBC, and UA for screening in order to participate in boxing competition.  Discussed that I am unclear if these will be covered by insurance as he is asymptomatic.  He would like to proceed. EKG completed and personally reviewed as NSR.  Labs normal.  Referral placed for exercise stress test.  Will complete patient's paperwork once all testing is completed.   - EKG 12-lead, tracing only  - Exercise Stress Test - Adult  - UA with Microscopic reflex to Culture - lab collect  - CBC with platelets    Excess skin of abdomen  Interested in discussing skin removal surgery to the abdominal area due to history of weight loss.   - Adult Plastic Surgery  Referral            BMI  Estimated body mass index is 37.86 kg/m  as calculated from the following:    Height as of 1/29/25: 1.727 m (5' 8\").    Weight as of this encounter: 112.9 kg (249 lb).             Alo Douglas is a 45 year old, presenting for the following health issues:  Referral (Stress test ; EKG & vision )    Patient presents for cardiovascular screening.  He would like to participate in a boxing competition and has paperwork that needs to be completed.  They are requesting an EKG, stress testing, vision testing, and labs.  Patient has lost a fair amount of weight.  He denies any CP, SOB, or SALOMON.       History of Present Illness       Reason for visit:  Physical He is missing 1 dose(s) of medications per week.  He is not taking prescribed medications regularly due to remembering to take.              Objective    /60 (BP Location: Right arm, Patient Position: Sitting, Cuff Size: Adult Large)   Pulse 86   Temp 97.4  F (36.3  C) (Temporal)   Resp 14   Wt 112.9 kg (249 lb)   SpO2 98%   BMI 37.86 kg/m    Body mass index is 37.86 kg/m .  Physical Exam   GENERAL: alert and no distress  EYES: Eyes grossly normal to inspection, PERRL and conjunctivae and " sclerae normal  HENT: ear canals and TM's normal, nose and mouth without ulcers or lesions  NECK: no adenopathy, no asymmetry, masses, or scars  RESP: lungs clear to auscultation - no rales, rhonchi or wheezes  CV: regular rate and rhythm, normal S1 S2, no S3 or S4, no murmur, click or rub, no peripheral edema  ABDOMEN: soft, nontender, no hepatosplenomegaly, no masses and bowel sounds normal  MS: no gross musculoskeletal defects noted, no edema  SKIN: no suspicious lesions or rashes  NEURO: Normal strength and tone, mentation intact and speech normal  PSYCH: mentation appears normal, affect normal/bright            Signed Electronically by: Leesa Noland NP

## 2025-05-12 ENCOUNTER — PATIENT OUTREACH (OUTPATIENT)
Dept: CARE COORDINATION | Facility: CLINIC | Age: 45
End: 2025-05-12
Payer: COMMERCIAL

## 2025-05-12 LAB
ATRIAL RATE - MUSE: 78 BPM
DIASTOLIC BLOOD PRESSURE - MUSE: NORMAL MMHG
INTERPRETATION ECG - MUSE: NORMAL
P AXIS - MUSE: -11 DEGREES
PR INTERVAL - MUSE: 136 MS
QRS DURATION - MUSE: 86 MS
QT - MUSE: 370 MS
QTC - MUSE: 421 MS
R AXIS - MUSE: 25 DEGREES
SYSTOLIC BLOOD PRESSURE - MUSE: NORMAL MMHG
T AXIS - MUSE: 39 DEGREES
VENTRICULAR RATE- MUSE: 78 BPM

## 2025-05-14 ENCOUNTER — PATIENT OUTREACH (OUTPATIENT)
Dept: CARE COORDINATION | Facility: CLINIC | Age: 45
End: 2025-05-14
Payer: COMMERCIAL

## 2025-05-19 DIAGNOSIS — E66.01 MORBID OBESITY (H): ICD-10-CM

## 2025-05-19 DIAGNOSIS — I10 PRIMARY HYPERTENSION: ICD-10-CM

## 2025-05-19 DIAGNOSIS — E11.9 TYPE 2 DIABETES MELLITUS WITHOUT COMPLICATION, WITHOUT LONG-TERM CURRENT USE OF INSULIN (H): ICD-10-CM

## 2025-05-19 DIAGNOSIS — E78.5 HYPERLIPIDEMIA, UNSPECIFIED HYPERLIPIDEMIA TYPE: ICD-10-CM

## 2025-06-10 ENCOUNTER — HOSPITAL ENCOUNTER (OUTPATIENT)
Dept: CARDIOLOGY | Facility: CLINIC | Age: 45
Discharge: HOME | End: 2025-06-10
Attending: NURSE PRACTITIONER
Payer: COMMERCIAL

## 2025-06-10 DIAGNOSIS — Z13.6 SCREENING FOR CARDIOVASCULAR CONDITION: ICD-10-CM

## 2025-06-10 LAB
CV STRESS CURRENT BP HE: NORMAL
CV STRESS CURRENT HR HE: 100
CV STRESS CURRENT HR HE: 101
CV STRESS CURRENT HR HE: 106
CV STRESS CURRENT HR HE: 107
CV STRESS CURRENT HR HE: 110
CV STRESS CURRENT HR HE: 110
CV STRESS CURRENT HR HE: 114
CV STRESS CURRENT HR HE: 115
CV STRESS CURRENT HR HE: 118
CV STRESS CURRENT HR HE: 119
CV STRESS CURRENT HR HE: 121
CV STRESS CURRENT HR HE: 122
CV STRESS CURRENT HR HE: 131
CV STRESS CURRENT HR HE: 133
CV STRESS CURRENT HR HE: 133
CV STRESS CURRENT HR HE: 140
CV STRESS CURRENT HR HE: 141
CV STRESS CURRENT HR HE: 147
CV STRESS CURRENT HR HE: 148
CV STRESS CURRENT HR HE: 148
CV STRESS CURRENT HR HE: 152
CV STRESS CURRENT HR HE: 156
CV STRESS CURRENT HR HE: 86
CV STRESS CURRENT HR HE: 91
CV STRESS CURRENT HR HE: 92
CV STRESS CURRENT HR HE: 93
CV STRESS CURRENT HR HE: 95
CV STRESS CURRENT HR HE: 95
CV STRESS CURRENT HR HE: 96
CV STRESS CURRENT HR HE: 97
CV STRESS CURRENT HR HE: 98
CV STRESS DEVIATION TIME HE: NORMAL
CV STRESS ECHO PERCENT HR HE: NORMAL
CV STRESS EXERCISE STAGE HE: NORMAL
CV STRESS EXERCISE STAGE REACHED HE: NORMAL
CV STRESS FINAL RESTING BP HE: NORMAL
CV STRESS FINAL RESTING HR HE: 91
CV STRESS MAX HR HE: 158
CV STRESS MAX TREADMILL GRADE HE: 16
CV STRESS MAX TREADMILL SPEED HE: 4.2
CV STRESS PEAK DIA BP HE: NORMAL
CV STRESS PEAK SYS BP HE: NORMAL
CV STRESS PHASE HE: NORMAL
CV STRESS PROTOCOL HE: NORMAL
CV STRESS REASON STOPPED HE: NORMAL
CV STRESS RESTING PT POSITION HE: NORMAL
CV STRESS RESTING PT POSITION HE: NORMAL
CV STRESS ST DEVIATION AMOUNT HE: NORMAL
CV STRESS ST DEVIATION ELEVATION HE: NORMAL
CV STRESS ST EVELATION AMOUNT HE: NORMAL
CV STRESS SYMPTOMS HE: NORMAL
CV STRESS TEST TYPE HE: NORMAL
CV STRESS TOTAL STAGE TIME MIN 1 HE: NORMAL
STRESS ECHO BASELINE DIASTOLIC HE: 89
STRESS ECHO BASELINE HR: 82
STRESS ECHO BASELINE SYSTOLIC BP: 132
STRESS ECHO LAST STRESS DIASTOLIC BP: 84
STRESS ECHO LAST STRESS HR: 156
STRESS ECHO LAST STRESS SYSTOLIC BP: 150
STRESS ECHO POST ESTIMATED WORKLOAD: 12.1
STRESS ECHO POST EXERCISE DUR MIN: 10
STRESS ECHO POST EXERCISE DUR SEC: 30
STRESS ECHO TARGET HR: 148

## 2025-06-10 PROCEDURE — 93017 CV STRESS TEST TRACING ONLY: CPT

## 2025-06-10 PROCEDURE — 93018 CV STRESS TEST I&R ONLY: CPT | Performed by: INTERNAL MEDICINE

## 2025-06-10 PROCEDURE — 93016 CV STRESS TEST SUPVJ ONLY: CPT | Performed by: INTERNAL MEDICINE

## 2025-07-08 ENCOUNTER — OFFICE VISIT (OUTPATIENT)
Dept: FAMILY MEDICINE | Facility: CLINIC | Age: 45
End: 2025-07-08
Payer: COMMERCIAL

## 2025-07-08 VITALS
SYSTOLIC BLOOD PRESSURE: 108 MMHG | HEART RATE: 81 BPM | OXYGEN SATURATION: 98 % | BODY MASS INDEX: 39.1 KG/M2 | DIASTOLIC BLOOD PRESSURE: 78 MMHG | RESPIRATION RATE: 14 BRPM | TEMPERATURE: 97.4 F | WEIGHT: 264 LBS | HEIGHT: 69 IN

## 2025-07-08 DIAGNOSIS — G47.00 INSOMNIA, UNSPECIFIED TYPE: ICD-10-CM

## 2025-07-08 DIAGNOSIS — F41.9 ANXIETY AND DEPRESSION: ICD-10-CM

## 2025-07-08 DIAGNOSIS — R53.83 LETHARGY: ICD-10-CM

## 2025-07-08 DIAGNOSIS — E78.5 HYPERLIPIDEMIA, UNSPECIFIED HYPERLIPIDEMIA TYPE: ICD-10-CM

## 2025-07-08 DIAGNOSIS — E11.9 TYPE 2 DIABETES MELLITUS WITHOUT COMPLICATION, WITHOUT LONG-TERM CURRENT USE OF INSULIN (H): ICD-10-CM

## 2025-07-08 DIAGNOSIS — F32.A ANXIETY AND DEPRESSION: ICD-10-CM

## 2025-07-08 DIAGNOSIS — Z00.00 ROUTINE GENERAL MEDICAL EXAMINATION AT A HEALTH CARE FACILITY: Primary | ICD-10-CM

## 2025-07-08 DIAGNOSIS — E66.01 MORBID OBESITY (H): ICD-10-CM

## 2025-07-08 DIAGNOSIS — I10 ESSENTIAL HYPERTENSION: ICD-10-CM

## 2025-07-08 LAB
EST. AVERAGE GLUCOSE BLD GHB EST-MCNC: 108 MG/DL
HBA1C MFR BLD: 5.4 % (ref 0–5.6)

## 2025-07-08 PROCEDURE — 80053 COMPREHEN METABOLIC PANEL: CPT | Performed by: NURSE PRACTITIONER

## 2025-07-08 PROCEDURE — 90480 ADMN SARSCOV2 VAC 1/ONLY CMP: CPT | Performed by: NURSE PRACTITIONER

## 2025-07-08 PROCEDURE — 3078F DIAST BP <80 MM HG: CPT | Performed by: NURSE PRACTITIONER

## 2025-07-08 PROCEDURE — 99396 PREV VISIT EST AGE 40-64: CPT | Mod: 25 | Performed by: NURSE PRACTITIONER

## 2025-07-08 PROCEDURE — 3044F HG A1C LEVEL LT 7.0%: CPT | Performed by: NURSE PRACTITIONER

## 2025-07-08 PROCEDURE — 83036 HEMOGLOBIN GLYCOSYLATED A1C: CPT | Performed by: NURSE PRACTITIONER

## 2025-07-08 PROCEDURE — G2211 COMPLEX E/M VISIT ADD ON: HCPCS | Performed by: NURSE PRACTITIONER

## 2025-07-08 PROCEDURE — 91320 SARSCV2 VAC 30MCG TRS-SUC IM: CPT | Performed by: NURSE PRACTITIONER

## 2025-07-08 PROCEDURE — 36415 COLL VENOUS BLD VENIPUNCTURE: CPT | Performed by: NURSE PRACTITIONER

## 2025-07-08 PROCEDURE — 3074F SYST BP LT 130 MM HG: CPT | Performed by: NURSE PRACTITIONER

## 2025-07-08 PROCEDURE — 99214 OFFICE O/P EST MOD 30 MIN: CPT | Mod: 25 | Performed by: NURSE PRACTITIONER

## 2025-07-08 RX ORDER — ATORVASTATIN CALCIUM 20 MG/1
20 TABLET, FILM COATED ORAL AT BEDTIME
Qty: 90 TABLET | Refills: 3 | Status: SHIPPED | OUTPATIENT
Start: 2025-07-08

## 2025-07-08 RX ORDER — LISINOPRIL 5 MG/1
5 TABLET ORAL DAILY
Qty: 90 TABLET | Refills: 3 | Status: SHIPPED | OUTPATIENT
Start: 2025-07-08

## 2025-07-08 RX ORDER — DULOXETIN HYDROCHLORIDE 60 MG/1
CAPSULE, DELAYED RELEASE ORAL
Qty: 90 CAPSULE | Refills: 3 | Status: SHIPPED | OUTPATIENT
Start: 2025-07-08

## 2025-07-08 RX ORDER — DULOXETIN HYDROCHLORIDE 30 MG/1
CAPSULE, DELAYED RELEASE ORAL
Qty: 90 CAPSULE | Refills: 1 | Status: SHIPPED | OUTPATIENT
Start: 2025-07-08

## 2025-07-08 RX ORDER — TRAZODONE HYDROCHLORIDE 100 MG/1
200 TABLET ORAL AT BEDTIME
Qty: 180 TABLET | Refills: 3 | Status: SHIPPED | OUTPATIENT
Start: 2025-07-08

## 2025-07-08 SDOH — HEALTH STABILITY: PHYSICAL HEALTH: ON AVERAGE, HOW MANY DAYS PER WEEK DO YOU ENGAGE IN MODERATE TO STRENUOUS EXERCISE (LIKE A BRISK WALK)?: 5 DAYS

## 2025-07-08 SDOH — HEALTH STABILITY: PHYSICAL HEALTH: ON AVERAGE, HOW MANY MINUTES DO YOU ENGAGE IN EXERCISE AT THIS LEVEL?: 50 MIN

## 2025-07-08 ASSESSMENT — PATIENT HEALTH QUESTIONNAIRE - PHQ9
10. IF YOU CHECKED OFF ANY PROBLEMS, HOW DIFFICULT HAVE THESE PROBLEMS MADE IT FOR YOU TO DO YOUR WORK, TAKE CARE OF THINGS AT HOME, OR GET ALONG WITH OTHER PEOPLE: SOMEWHAT DIFFICULT
SUM OF ALL RESPONSES TO PHQ QUESTIONS 1-9: 12
SUM OF ALL RESPONSES TO PHQ QUESTIONS 1-9: 12

## 2025-07-08 ASSESSMENT — SOCIAL DETERMINANTS OF HEALTH (SDOH): HOW OFTEN DO YOU GET TOGETHER WITH FRIENDS OR RELATIVES?: PATIENT DECLINED

## 2025-07-08 NOTE — PROGRESS NOTES
Preventive Care Visit  [facility]  Leesa Noland NP, Family Medicine  Jul 8, 2025      Assessment & Plan     Routine general medical examination at a health care facility    Type 2 diabetes mellitus without complication, without long-term current use of insulin (H)  Very well controlled with Hgb A1c of 5.4.  Continue on current dose of Mounjaro.  Follow up in 6 months.  Encouraged patient to schedule an eye exam.  - Hemoglobin A1c  - tirzepatide (MOUNJARO) 15 MG/0.5ML SOAJ auto-injector pen  Dispense: 6 mL; Refill: 3    Essential hypertension  Well controlled.   - lisinopril (ZESTRIL) 5 MG tablet  Dispense: 90 tablet; Refill: 3  - Comprehensive metabolic panel (BMP + Alb, Alk Phos, ALT, AST, Total. Bili, TP)  - tirzepatide (MOUNJARO) 15 MG/0.5ML SOAJ auto-injector pen  Dispense: 6 mL; Refill: 3    Hyperlipidemia, unspecified hyperlipidemia type  - atorvastatin (LIPITOR) 20 MG tablet  Dispense: 90 tablet; Refill: 3  - tirzepatide (MOUNJARO) 15 MG/0.5ML SOAJ auto-injector pen  Dispense: 6 mL; Refill: 3    Anxiety and depression  Stable.  - DULoxetine (CYMBALTA) 30 MG capsule  Dispense: 90 capsule; Refill: 1  - DULoxetine (CYMBALTA) 60 MG capsule  Dispense: 90 capsule; Refill: 3    Morbid obesity (H)  - tirzepatide (MOUNJARO) 15 MG/0.5ML SOAJ auto-injector pen  Dispense: 6 mL; Refill: 3    Insomnia, unspecified type  - traZODone (DESYREL) 100 MG tablet  Dispense: 180 tablet; Refill: 3    Lethargy  Patient interested in checking a testosterone level due to some generalized lethargy.  Will have him return on another morning to have this drawn.  Discussed that I do not manage low testosterone, therefore would refer him out if treatment was indicated.  - Testosterone Free and Total; Future      The longitudinal plan of care for the diagnosis(es)/condition(s) as documented were addressed during this visit. Due to the added complexity in care, I will continue to support Misael in the subsequent  "management and with ongoing continuity of care.      BMI  Estimated body mass index is 39.21 kg/m  as calculated from the following:    Height as of this encounter: 1.748 m (5' 8.8\").    Weight as of this encounter: 119.7 kg (264 lb).   Weight management plan: Discussed healthy diet and exercise guidelines    Counseling  Appropriate preventive services were addressed with this patient via screening, questionnaire, or discussion as appropriate for fall prevention, nutrition, physical activity, Tobacco-use cessation, social engagement, weight loss and cognition.  Checklist reviewing preventive services available has been given to the patient.  Reviewed patient's diet, addressing concerns and/or questions.   He is at risk for psychosocial distress and has been provided with information to reduce risk.         Alo Douglas is a 45 year old, presenting for the following:  Physical (Nonfasting ) and Follow Up (DM2 )    Patient has gained a little bit of weight since his last check.  This is frustrating to him.  His close are still fitting the same.    Interested in getting his testosterone checked.  Feels like he has some general lethargy and possibly difficulty gaining muscle.    Advance Care Planning    Discussed advance care planning with patient; however, patient declined at this time.        7/8/2025   General Health   How would you rate your overall physical health? Good   Feel stress (tense, anxious, or unable to sleep) Very much   (!) STRESS CONCERN      7/8/2025   Nutrition   Three or more servings of calcium each day? (!) I DON'T KNOW   Diet: Regular (no restrictions)   How many servings of fruit and vegetables per day? (!) 2-3   How many sweetened beverages each day? (!) 2         7/8/2025   Exercise   Days per week of moderate/strenous exercise 5 days   Average minutes spent exercising at this level 50 min         7/8/2025   Social Factors   Frequency of gathering with friends or relatives Patient " "declined   Worry food won't last until get money to buy more No   Food not last or not have enough money for food? No   Do you have housing? (Housing is defined as stable permanent housing and does not include staying outside in a car, in a tent, in an abandoned building, in an overnight shelter, or couch-surfing.) Yes   Are you worried about losing your housing? No   Lack of transportation? No   Unable to get utilities (heat,electricity)? No         7/8/2025   Dental   Dentist two times every year? Yes       Today's PHQ-9 Score:       7/8/2025     2:55 PM   PHQ-9 SCORE   PHQ-9 Total Score MyChart 12 (Moderate depression)   PHQ-9 Total Score 12        Patient-reported         7/8/2025   Substance Use   Alcohol more than 3/day or more than 7/wk No   Do you use any other substances recreationally? (!) CANNABIS PRODUCTS    (!) PRESCRIPTION DRUGS       Multiple values from one day are sorted in reverse-chronological order     Social History     Tobacco Use    Smoking status: Never     Passive exposure: Never    Smokeless tobacco: Never   Vaping Use    Vaping status: Never Used   Substance Use Topics    Alcohol use: No    Drug use: No             7/8/2025   One time HIV Screening   Previous HIV test? No         7/8/2025   STI Screening   New sexual partner(s) since last STI/HIV test? No   ASCVD Risk   The ASCVD Risk score (Milton LAL, et al., 2019) failed to calculate for the following reasons:    The valid total cholesterol range is 130 to 320 mg/dL        7/8/2025   Contraception/Family Planning   Questions about contraception or family planning No        Reviewed and updated as needed this visit by Provider   Tobacco  Allergies  Meds  Problems  Med Hx  Surg Hx  Fam Hx                 Objective    Exam  /78 (BP Location: Right arm, Patient Position: Sitting, Cuff Size: Adult Large)   Pulse 81   Temp 97.4  F (36.3  C) (Temporal)   Resp 14   Ht 1.748 m (5' 8.8\")   Wt 119.7 kg (264 lb)   SpO2 " "98%   BMI 39.21 kg/m     Estimated body mass index is 39.21 kg/m  as calculated from the following:    Height as of this encounter: 1.748 m (5' 8.8\").    Weight as of this encounter: 119.7 kg (264 lb).    Physical Exam  GENERAL: alert and no distress  EYES: Eyes grossly normal to inspection, PERRL and conjunctivae and sclerae normal  HENT: ear canals and TM's normal, nose and mouth without ulcers or lesions  NECK: no adenopathy, no asymmetry, masses, or scars  RESP: lungs clear to auscultation - no rales, rhonchi or wheezes  CV: regular rate and rhythm, normal S1 S2, no S3 or S4, no murmur, click or rub, no peripheral edema  ABDOMEN: soft, nontender, no hepatosplenomegaly, no masses and bowel sounds normal  MS: no gross musculoskeletal defects noted, no edema  SKIN: no suspicious lesions or rashes  NEURO: Normal strength and tone, mentation intact and speech normal  PSYCH: mentation appears normal, affect normal/bright        Signed Electronically by: Leesa Noland NP    "

## 2025-07-08 NOTE — PATIENT INSTRUCTIONS
Patient Education   Preventive Care Advice   This is general advice given by our system to help you stay healthy. However, your care team may have specific advice just for you. Please talk to your care team about your preventive care needs.  Nutrition  Eat 5 or more servings of fruits and vegetables each day.  Try wheat bread, brown rice and whole grain pasta (instead of white bread, rice, and pasta).  Get enough calcium and vitamin D. Check the label on foods and aim for 100% of the RDA (recommended daily allowance).  Lifestyle  Exercise at least 150 minutes each week  (30 minutes a day, 5 days a week).  Do muscle strengthening activities 2 days a week. These help control your weight and prevent disease.  No smoking.  Wear sunscreen to prevent skin cancer.  Have a dental exam and cleaning every 6 months.  Yearly exams  See your health care team every year to talk about:  Any changes in your health.  Any medicines your care team has prescribed.  Preventive care, family planning, and ways to prevent chronic diseases.  Shots (vaccines)   HPV shots (up to age 26), if you've never had them before.  Hepatitis B shots (up to age 59), if you've never had them before.  COVID-19 shot: Get this shot when it's due.  Flu shot: Get a flu shot every year.  Tetanus shot: Get a tetanus shot every 10 years.  Pneumococcal, hepatitis A, and RSV shots: Ask your care team if you need these based on your risk.  Shingles shot (for age 50 and up)  General health tests  Diabetes screening:  Starting at age 35, Get screened for diabetes at least every 3 years.  If you are younger than age 35, ask your care team if you should be screened for diabetes.  Cholesterol test: At age 39, start having a cholesterol test every 5 years, or more often if advised.  Bone density scan (DEXA): At age 50, ask your care team if you should have this scan for osteoporosis (brittle bones).  Hepatitis C: Get tested at least once in your life.  STIs (sexually  transmitted infections)  Before age 24: Ask your care team if you should be screened for STIs.  After age 24: Get screened for STIs if you're at risk. You are at risk for STIs (including HIV) if:  You are sexually active with more than one person.  You don't use condoms every time.  You or a partner was diagnosed with a sexually transmitted infection.  If you are at risk for HIV, ask about PrEP medicine to prevent HIV.  Get tested for HIV at least once in your life, whether you are at risk for HIV or not.  Cancer screening tests  Cervical cancer screening: If you have a cervix, begin getting regular cervical cancer screening tests starting at age 21.  Breast cancer scan (mammogram): If you've ever had breasts, begin having regular mammograms starting at age 40. This is a scan to check for breast cancer.  Colon cancer screening: It is important to start screening for colon cancer at age 45.  Have a colonoscopy test every 10 years (or more often if you're at risk) Or, ask your provider about stool tests like a FIT test every year or Cologuard test every 3 years.  To learn more about your testing options, visit:   .  For help making a decision, visit:   https://bit.ly/qf12047.  Prostate cancer screening test: If you have a prostate, ask your care team if a prostate cancer screening test (PSA) at age 55 is right for you.  Lung cancer screening: If you are a current or former smoker ages 50 to 80, ask your care team if ongoing lung cancer screenings are right for you.  For informational purposes only. Not to replace the advice of your health care provider. Copyright   2023 [facility]. All rights reserved. Clinically reviewed by the [facility] Transitions Program. Bike HUD 101220 - REV 01/24.  Learning About Stress  What is stress?     Stress is your body's response to a hard situation. Your body can have a physical, emotional, or mental response. Stress is a fact of life for most people, and it  affects everyone differently. What causes stress for you may not be stressful for someone else.  A lot of things can cause stress. You may feel stress when you go on a job interview, take a test, or run a race. This kind of short-term stress is normal and even useful. It can help you if you need to work hard or react quickly. For example, stress can help you finish an important job on time.  Long-term stress is caused by ongoing stressful situations or events. Examples of long-term stress include long-term health problems, ongoing problems at work, or conflicts in your family. Long-term stress can harm your health.  How does stress affect your health?  When you are stressed, your body responds as though you are in danger. It makes hormones that speed up your heart, make you breathe faster, and give you a burst of energy. This is called the fight-or-flight stress response. If the stress is over quickly, your body goes back to normal and no harm is done.  But if stress happens too often or lasts too long, it can have bad effects. Long-term stress can make you more likely to get sick, and it can make symptoms of some diseases worse. If you tense up when you are stressed, you may develop neck, shoulder, or low back pain. Stress is linked to high blood pressure and heart disease.  Stress also harms your emotional health. It can make you mello, tense, or depressed. Your relationships may suffer, and you may not do well at work or school.  What can you do to manage stress?  You can try these things to help manage stress:   Do something active. Exercise or activity can help reduce stress. Walking is a great way to get started. Even everyday activities such as housecleaning or yard work can help.  Try yoga or cristal chi. These techniques combine exercise and meditation. You may need some training at first to learn them.  Do something you enjoy. For example, listen to music or go to a movie. Practice your hobby or do volunteer  "work.  Meditate. This can help you relax, because you are not worrying about what happened before or what may happen in the future.  Do guided imagery. Imagine yourself in any setting that helps you feel calm. You can use online videos, books, or a teacher to guide you.  Do breathing exercises. For example:  From a standing position, bend forward from the waist with your knees slightly bent. Let your arms dangle close to the floor.  Breathe in slowly and deeply as you return to a standing position. Roll up slowly and lift your head last.  Hold your breath for just a few seconds in the standing position.  Breathe out slowly and bend forward from the waist.  Let your feelings out. Talk, laugh, cry, and express anger when you need to. Talking with supportive friends or family, a counselor, or a dania leader about your feelings is a healthy way to relieve stress. Avoid discussing your feelings with people who make you feel worse.  Write. It may help to write about things that are bothering you. This helps you find out how much stress you feel and what is causing it. When you know this, you can find better ways to cope.  What can you do to prevent stress?  You might try some of these things to help prevent stress:  Manage your time. This helps you find time to do the things you want and need to do.  Get enough sleep. Your body recovers from the stresses of the day while you are sleeping.  Get support. Your family, friends, and community can make a difference in how you experience stress.  Limit your news feed. Avoid or limit time on social media or news that may make you feel stressed.  Do something active. Exercise or activity can help reduce stress. Walking is a great way to get started.  Where can you learn more?  Go to https://www.[employer].net/patiented  Enter N032 in the search box to learn more about \"Learning About Stress.\"  Current as of: October 24, 2024  Content Version: 14.5 2024-2025 [employer]   Care instructions adapted under license by your healthcare professional. If you have questions about a medical condition or this instruction, always ask your healthcare professional. [employer] disclaims any warranty or liability for your use of this information.    Recovering From Depression: Care Instructions  Overview    Sticking to your treatment plan is important as you recover from depression. It may take time for your symptoms to get better after you start treatment. Try not to give up if you don't feel better right away. Make sure you keep going to counseling and taking any prescribed medicine if they are part of your treatment plan.  Focus on things that can help you feel better, such as being with friends and family. Try to eat healthy foods, be active, and get enough sleep. Take things slowly as you begin to recover.  Follow-up care is a key part of your treatment and safety. Be sure to make and go to all appointments, and call your doctor if you are having problems. It's also a good idea to know your test results and keep a list of the medicines you take.  How can you care for yourself at home?  Be realistic  If you have a large task to do, break it up into smaller steps you can handle, and just do what you can.  You may want to put off important decisions until your depression has lifted. If you have plans that will have a major impact on your life, such as marriage, divorce, or a job change, try to wait a bit. Talk it over with friends and loved ones who can help you look at the overall picture first.  Reaching out to people for help is important. Do not isolate yourself. Let your family and friends help you. Find someone you can trust and confide in, and talk to that person.  Be patient, and be kind to yourself. Remember that depression is not your fault and is not something you can overcome with willpower alone. Treatment is important for depression, just like for any other  illness. Feeling better takes time, and your mood will improve little by little.  Stay active  Stay busy and get outside. Take a walk, or try some other light exercise.  Talk with your doctor about an exercise program. Exercise can help with mild depression.  Go to a movie or concert. Take part in a Confucianism activity or other social gathering. Go to a ball game.  Ask a friend to have dinner with you.  Take care of yourself  Eat healthy foods such as fresh fruits and vegetables, whole grains, and lean protein. If you have lost your appetite, eat small snacks rather than large meals.  Avoid using marijuana and other drugs and drinking alcohol. Do not take medicines that have not been prescribed for you. They may interfere with medicines you may be taking for depression, or they may make your depression worse.  Take your medicines exactly as they are prescribed. You may start to feel better within 1 to 3 weeks of taking antidepressant medicine. But it can take as many as 6 to 8 weeks to see more improvement. If you have questions or concerns about your medicines, or if you do not notice any improvement by 3 weeks, talk to your doctor.  Continue to take your medicine after your symptoms improve. Taking your medicine for at least 6 months after you feel better can help keep you from getting depressed again. If this isn't the first time you have been depressed, your doctor may recommend you to take medicine even longer.  If you have any side effects from your medicine, tell your doctor. Many side effects are mild and will go away on their own after you have been taking the medicine for a few weeks. Some may last longer. Talk to your doctor if side effects are bothering you too much. You might be able to try a different medicine.  Continue counseling. It may help prevent depression from returning, especially if you've had multiple episodes of depression. Talk with your counselor if you are having a hard time attending your  sessions or you think the sessions aren't working. Don't just stop going.  Get enough sleep. Talk to your doctor if you are having problems sleeping.  Avoid sleeping pills unless they are prescribed by the doctor treating your depression. Sleeping pills may make you groggy during the day, and they may interact with other medicine you are taking.  If you have any other illnesses, such as diabetes, heart disease, or high blood pressure, make sure to continue with your treatment. Tell your doctor about all of the medicines you take, including those with or without a prescription.  Where to get help 24 hours a day, 7 days a week  If you or someone you know talks about suicide, self-harm, a mental health crisis, a substance use crisis, or any other kind of emotional distress, get help right away. You can:  Call the Suicide and Crisis Lifeline at [employer].  Text HOME to 119708 to access the Crisis Text Line.  Consider saving these numbers in your phone.  Go to [facility] for more information or to chat online.  Call 291 anytime you think you may need emergency care. For example, call if:  You feel like hurting yourself or someone else.  Someone you know has depression and is about to attempt or is attempting suicide.  Where to get help 24 hours a day, 7 days a week  If you or someone you know talks about suicide, self-harm, a mental health crisis, a substance use crisis, or any other kind of emotional distress, get help right away. You can:  Call the Suicide and Crisis Lifeline at 527.  Text HOME to 555606 to access the Crisis Text Line.  Consider saving these numbers in your phone.  Go to [facility] for more information or to chat online.  Call your doctor now or seek immediate medical care if:  You hear voices.  Someone you know has depression and:  Starts to give away possessions.  Uses illegal drugs or drinks alcohol heavily.  Talks or writes about death, including writing suicide notes or talking about guns,  "knives, or pills.  Starts to spend a lot of time alone.  Acts very aggressively or suddenly appears calm.  Watch closely for changes in your health, and be sure to contact your doctor if:  You do not get better as expected.  Where can you learn more?  Go to https://www.[employer].net/patiented  Enter N529 in the search box to learn more about \"Recovering From Depression: Care Instructions.\"  Current as of: July 31, 2024  Content Version: 14.5    7590-6017 [employer].   Care instructions adapted under license by your healthcare professional. If you have questions about a medical condition or this instruction, always ask your healthcare professional. [employer] disclaims any warranty or liability for your use of this information.    Substance Use Disorder: Care Instructions  Overview     You can improve your life and health by stopping your use of alcohol or drugs. When you don't drink or use drugs, you may feel and sleep better. You may get along better with your family, friends, and coworkers. There are medicines and programs that can help with substance use disorder.  How can you care for yourself at home?  Here are some ways to help you stay sober and prevent relapse.  If you have been given medicine to help keep you sober or reduce your cravings, be sure to take it exactly as prescribed.  Talk to your doctor about programs that can help you stop using drugs or drinking alcohol.  Do not keep alcohol or drugs in your home.  Plan ahead. Think about what you'll say if other people ask you to drink or use drugs. Try not to spend time with people who drink or use drugs.  Use the time and money spent on drinking or drugs to do something that's important to you.  Preventing a relapse  Have a plan to deal with relapse. Learn to recognize changes in your thinking that lead you to drink or use drugs. Get help before you start to drink or use drugs again.  Try to stay away from situations, friends, or " places that may lead you to drink or use drugs.  If you feel the need to drink alcohol or use drugs again, seek help right away. Call a trusted friend or family member. Some people get support from organizations such as Narcotics Anonymous or [employer] or from treatment facilities.  If you relapse, get help as soon as you can. Some people make a plan with another person that outlines what they want that person to do for them if they relapse. The plan usually includes how to handle the relapse and who to notify in case of relapse.  Don't give up. Remember that a relapse doesn't mean that you have failed. Use the experience to learn the triggers that lead you to drink or use drugs. Then quit again. Recovery is a lifelong process. Many people have several relapses before they are able to quit for good.  Follow-up care is a key part of your treatment and safety. Be sure to make and go to all appointments, and call your doctor if you are having problems. It's also a good idea to know your test results and keep a list of the medicines you take.  When should you call for help?   Call [phone]  anytime you think you may need emergency care. For example, call if you or someone else:    Has overdosed or has withdrawal signs. Be sure to tell the emergency workers that you are or someone else is using or trying to quit using drugs. Overdose or withdrawal signs may include:  Losing consciousness.  Seizure.  Seeing or hearing things that aren't there (hallucinations).     Is thinking or talking about suicide or harming others.   Where to get help 24 hours a day, 7 days a week   If you or someone you know talks about suicide, self-harm, a mental health crisis, a substance use crisis, or any other kind of emotional distress, get help right away. You can:    Call the Suicide and Crisis Lifeline at 038.     Call 4-102-464-TALK (1-588.640.2905).     Text HOME to 526617 to access the Crisis Text Line.   Consider saving these numbers in  "your phone.  Go to [employer] for more information or to chat online.  Call your doctor now or seek immediate medical care if:    You are having withdrawal symptoms. These may include nausea or vomiting, sweating, shakiness, and anxiety.   Watch closely for changes in your health, and be sure to contact your doctor if:    You have a relapse.     You need more help or support to stop.   Where can you learn more?  Go to https://www.[employer].net/patiented  Enter H573 in the search box to learn more about \"Substance Use Disorder: Care Instructions.\"  Current as of: August 20, 2024  Content Version: 14.5    0312-6732 [employer].   Care instructions adapted under license by your healthcare professional. If you have questions about a medical condition or this instruction, always ask your healthcare professional. [employer] disclaims any warranty or liability for your use of this information.       "

## 2025-07-09 LAB
ALBUMIN SERPL BCG-MCNC: 4.4 G/DL (ref 3.5–5.2)
ALP SERPL-CCNC: 106 U/L (ref 40–150)
ALT SERPL W P-5'-P-CCNC: 34 U/L (ref 0–70)
ANION GAP SERPL CALCULATED.3IONS-SCNC: 10 MMOL/L (ref 7–15)
AST SERPL W P-5'-P-CCNC: 27 U/L (ref 0–45)
BILIRUB SERPL-MCNC: 0.5 MG/DL
BUN SERPL-MCNC: 13.1 MG/DL (ref 6–20)
CALCIUM SERPL-MCNC: 9.7 MG/DL (ref 8.8–10.4)
CHLORIDE SERPL-SCNC: 102 MMOL/L (ref 98–107)
CREAT SERPL-MCNC: 1.02 MG/DL (ref 0.67–1.17)
EGFRCR SERPLBLD CKD-EPI 2021: >90 ML/MIN/1.73M2
GLUCOSE SERPL-MCNC: 91 MG/DL (ref 70–99)
HCO3 SERPL-SCNC: 26 MMOL/L (ref 22–29)
POTASSIUM SERPL-SCNC: 4.6 MMOL/L (ref 3.4–5.3)
PROT SERPL-MCNC: 7.3 G/DL (ref 6.4–8.3)
SODIUM SERPL-SCNC: 138 MMOL/L (ref 135–145)

## 2025-07-25 ENCOUNTER — TRANSFERRED RECORDS (OUTPATIENT)
Dept: HEALTH INFORMATION MANAGEMENT | Facility: CLINIC | Age: 45
End: 2025-07-25
Payer: COMMERCIAL

## 2025-08-09 ENCOUNTER — HEALTH MAINTENANCE LETTER (OUTPATIENT)
Age: 45
End: 2025-08-09

## 2025-11-28 ENCOUNTER — PRE VISIT (OUTPATIENT)
Dept: PLASTIC SURGERY | Facility: CLINIC | Age: 45
End: 2025-11-28